# Patient Record
Sex: FEMALE | Race: OTHER | HISPANIC OR LATINO | URBAN - METROPOLITAN AREA
[De-identification: names, ages, dates, MRNs, and addresses within clinical notes are randomized per-mention and may not be internally consistent; named-entity substitution may affect disease eponyms.]

---

## 2023-09-15 ENCOUNTER — EMERGENCY (EMERGENCY)
Facility: HOSPITAL | Age: 33
LOS: 1 days | Discharge: HOME | End: 2023-09-15
Attending: EMERGENCY MEDICINE
Payer: MEDICAID

## 2023-09-15 VITALS
TEMPERATURE: 99 F | HEIGHT: 61.02 IN | RESPIRATION RATE: 20 BRPM | HEART RATE: 82 BPM | SYSTOLIC BLOOD PRESSURE: 122 MMHG | DIASTOLIC BLOOD PRESSURE: 88 MMHG | WEIGHT: 134.92 LBS | OXYGEN SATURATION: 98 %

## 2023-09-15 PROCEDURE — 99285 EMERGENCY DEPT VISIT HI MDM: CPT

## 2023-09-16 VITALS
HEART RATE: 77 BPM | RESPIRATION RATE: 16 BRPM | DIASTOLIC BLOOD PRESSURE: 72 MMHG | TEMPERATURE: 98 F | SYSTOLIC BLOOD PRESSURE: 108 MMHG | OXYGEN SATURATION: 98 %

## 2023-09-16 LAB
ALBUMIN SERPL ELPH-MCNC: 4.4 G/DL — SIGNIFICANT CHANGE UP (ref 3.3–5)
ALP SERPL-CCNC: 64 U/L — SIGNIFICANT CHANGE UP (ref 40–120)
ALT FLD-CCNC: 17 U/L — SIGNIFICANT CHANGE UP (ref 10–45)
ANION GAP SERPL CALC-SCNC: 16 MMOL/L — SIGNIFICANT CHANGE UP (ref 5–17)
AST SERPL-CCNC: 18 U/L — SIGNIFICANT CHANGE UP (ref 10–40)
BILIRUB SERPL-MCNC: 0.4 MG/DL — SIGNIFICANT CHANGE UP (ref 0.2–1.2)
BUN SERPL-MCNC: 15 MG/DL — SIGNIFICANT CHANGE UP (ref 7–23)
CALCIUM SERPL-MCNC: 9.2 MG/DL — SIGNIFICANT CHANGE UP (ref 8.4–10.5)
CHLORIDE SERPL-SCNC: 106 MMOL/L — SIGNIFICANT CHANGE UP (ref 96–108)
CO2 SERPL-SCNC: 17 MMOL/L — LOW (ref 22–31)
CREAT SERPL-MCNC: 0.66 MG/DL — SIGNIFICANT CHANGE UP (ref 0.5–1.3)
EGFR: 119 ML/MIN/1.73M2 — SIGNIFICANT CHANGE UP
GLUCOSE SERPL-MCNC: 89 MG/DL — SIGNIFICANT CHANGE UP (ref 70–99)
HCG SERPL-ACNC: <2 MIU/ML — SIGNIFICANT CHANGE UP
HCT VFR BLD CALC: 41.4 % — SIGNIFICANT CHANGE UP (ref 34.5–45)
HGB BLD-MCNC: 13.8 G/DL — SIGNIFICANT CHANGE UP (ref 11.5–15.5)
LIDOCAIN IGE QN: 26 U/L — SIGNIFICANT CHANGE UP (ref 7–60)
MCHC RBC-ENTMCNC: 29.9 PG — SIGNIFICANT CHANGE UP (ref 27–34)
MCHC RBC-ENTMCNC: 33.3 GM/DL — SIGNIFICANT CHANGE UP (ref 32–36)
MCV RBC AUTO: 89.6 FL — SIGNIFICANT CHANGE UP (ref 80–100)
NRBC # BLD: 0 /100 WBCS — SIGNIFICANT CHANGE UP (ref 0–0)
PLATELET # BLD AUTO: 180 K/UL — SIGNIFICANT CHANGE UP (ref 150–400)
POTASSIUM SERPL-MCNC: 4 MMOL/L — SIGNIFICANT CHANGE UP (ref 3.5–5.3)
POTASSIUM SERPL-SCNC: 4 MMOL/L — SIGNIFICANT CHANGE UP (ref 3.5–5.3)
PROT SERPL-MCNC: 7.4 G/DL — SIGNIFICANT CHANGE UP (ref 6–8.3)
RBC # BLD: 4.62 M/UL — SIGNIFICANT CHANGE UP (ref 3.8–5.2)
RBC # FLD: 12.1 % — SIGNIFICANT CHANGE UP (ref 10.3–14.5)
SODIUM SERPL-SCNC: 139 MMOL/L — SIGNIFICANT CHANGE UP (ref 135–145)
WBC # BLD: 9.29 K/UL — SIGNIFICANT CHANGE UP (ref 3.8–10.5)
WBC # FLD AUTO: 9.29 K/UL — SIGNIFICANT CHANGE UP (ref 3.8–10.5)

## 2023-09-16 PROCEDURE — 74177 CT ABD & PELVIS W/CONTRAST: CPT | Mod: 26,MA

## 2023-09-16 RX ORDER — ACETAMINOPHEN 500 MG
650 TABLET ORAL ONCE
Refills: 0 | Status: COMPLETED | OUTPATIENT
Start: 2023-09-16 | End: 2023-09-16

## 2023-09-16 RX ORDER — FAMOTIDINE 10 MG/ML
20 INJECTION INTRAVENOUS ONCE
Refills: 0 | Status: COMPLETED | OUTPATIENT
Start: 2023-09-16 | End: 2023-09-16

## 2023-09-16 RX ORDER — FAMOTIDINE 10 MG/ML
20 INJECTION INTRAVENOUS ONCE
Refills: 0 | Status: DISCONTINUED | OUTPATIENT
Start: 2023-09-16 | End: 2023-09-16

## 2023-09-16 RX ADMIN — Medication 650 MILLIGRAM(S): at 05:01

## 2023-09-16 RX ADMIN — Medication 30 MILLILITER(S): at 03:00

## 2023-09-16 RX ADMIN — FAMOTIDINE 20 MILLIGRAM(S): 10 INJECTION INTRAVENOUS at 03:00

## 2023-09-16 NOTE — ED PROVIDER NOTE - NSFOLLOWUPINSTRUCTIONS_ED_ALL_ED_FT
You were found to have a right ruptured ovarian cyst on your CT scan of the abdomen. This was likely causing your abdominal pain. There is no immediate treatment you need for this. You can use heat pack and Tylenol 650mg every 6 hours for treatment of the pain.     If you experience any worsening abdominal pain, fever, chills, nausea or vomiting, please return to the emergency room for further management.     Please follow up with your primary care provider or the general medicine clinic in 1-2 weeks for further monitoring of your symptoms.     Andorran:  Se descubrió que tenía un quiste ovárico derecho roto en logan tomografía computarizada del abdomen. Probablemente esto estaba causando logan dolor abdominal. No existe ningún tratamiento inmediato que necesite para esto. Puede utilizar andrea compresa térmica y Tylenol 650 mg cada 6 horas para el tratamiento del dolor.    Si experimenta algún empeoramiento del dolor abdominal, fiebre, escalofríos, náuseas o vómitos, regrese a la dillan de emergencias para recibir tratamiento adicional.    La Nena un seguimiento con logan proveedor de atención primaria o con la clínica de medicina general en 1 a 2 semanas para un mayor seguimiento de kristin síntomas.

## 2023-09-16 NOTE — ED PROVIDER NOTE - CLINICAL SUMMARY MEDICAL DECISION MAKING FREE TEXT BOX
34 yo F p/w chest and abdominal pain, EKG normal, symptoms most likely 2/2 GERD, possible H. pylori recurrence. Pt does not follow with a GI doctor. Lower concern for acute MI / NSTEMI given age and lack of EKG changes. 32 yo F p/w chest and abdominal pain, EKG normal, symptoms most likely 2/2 GERD, possible H. pylori recurrence. Pt does not follow with a GI doctor. Heart score 0, EKG non-ischemic, non-exertional Sx. 32 yo F p/w chest and abdominal pain, EKG normal, symptoms most likely 2/2 GERD, possible H. pylori recurrence. Pt does not follow with a GI doctor. Heart score 0, EKG non-ischemic, non-exertional Sx.     CT A/P ordered - found to have a Right corpus luteal cyst measuring 2 cm with surrounding free fluid, possibly ruptured, likely causing source of pain.    Abdominal pain improved at time of d/c, no acute abdominal pathology seen on CT.

## 2023-09-16 NOTE — ED PROVIDER NOTE - NSFOLLOWUPCLINICS_GEN_ALL_ED_FT
[facility] General Internal Medicine  General Internal Medicine  [address] [id number]  Phone: [phone]  Fax:   Follow Up Time: 7-10 Days

## 2023-09-16 NOTE — ED PROVIDER NOTE - PATIENT PORTAL LINK FT
You can access the FollowMyHealth Patient Portal offered by [facility] by registering at the following website: [email or web address]. By joining [employer]’s FollowMyHealth portal, you will also be able to view your health information using other applications (apps) compatible with our system.

## 2023-09-16 NOTE — ED ADULT NURSE NOTE - OBJECTIVE STATEMENT
32 y/o F with PMH of PCOS presents to ED complaining of chest pain. Pt reports epigastric and chest pain associated with shortness of breath and a HA since last night at 2000. Pt repots chest pain is worse with inspiration. Pt reports symptoms are similar feeling to a previous diagnosis of H pylori.  Denies any leg pain or swelling at this time. LMP was 8/24. Pt has not taken any medication for her pain. Upon arrival, pt is A&Ox4, satting well on RA. NSR on cardiac monitor. Afebrile orally. Denies dizziness, vision changes,, vomiting, diarrhea, fevers, chills, dysuria, hematuria, recent illness travel or fall.

## 2023-09-16 NOTE — ED PROVIDER NOTE - ATTENDING CONTRIBUTION TO CARE
Afebrile. Awake and Alert. Lungs CTA. Heart RRR. Abdomen soft, epigastric TTP mild without guarding, ND. CN II-XII grossly intact. Moves all extremities without lateralization. Afebrile. Awake and Alert. Lungs CTA. Heart RRR. Abdomen soft, RLQ TTP without guarding, ND. CN II-XII grossly intact. Moves all extremities without lateralization.

## 2023-09-16 NOTE — ED PROVIDER NOTE - PHYSICAL EXAMINATION
PHYSICAL EXAM:  GENERAL: NAD, well-groomed, well-developed  HEAD:  Atraumatic, Normocephalic  EYES: EOMI, PERRLA, conjunctiva and sclera clear  ENMT: No tonsillar erythema, exudates, or enlargement; Moist mucous membranes, Good dentition, No lesions  NECK: Supple, No JVD, Normal thyroid  CHEST/LUNG: Clear to percussion bilaterally; No rales, rhonchi, wheezing, or rubs  HEART: Regular rate and rhythm; No murmurs, rubs, or gallops  ABDOMEN: Soft, Nontender, Nondistended; Bowel sounds present  VASCULAR:  2+ Peripheral Pulses, No clubbing, cyanosis, or edema  LYMPH: No lymphadenopathy noted  SKIN: No rashes or lesions  NERVOUS SYSTEM:  Alert & Oriented X3, Good concentration;

## 2023-09-16 NOTE — ED PROVIDER NOTE - CHILD ABUSE FACILITY
Patient Education        Blood in the Urine: Care Instructions  Your Care Instructions    Blood in the urine, or hematuria, may make the urine look red, brown, or pink. There may be blood every time you urinate or just from time to time. You cannot always see blood in the urine, but it will show up in a urine test.  Blood in the urine may be serious. It should always be checked by a doctor. Your doctor may recommend more tests, including an X-ray, a CT scan, or a cystoscopy (which lets a doctor look inside the urethra and bladder). Blood in the urine can be a sign of another problem. Common causes are bladder infections and kidney stones. An injury to your groin or your genital area can also cause bleeding in the urinary tract. Very hard exercise--such as running a marathon--can cause blood in the urine. Blood in the urine can also be a sign of kidney disease or cancer in the bladder or kidney. Many cases of blood in the urine are caused by a harmless condition that runs in families. This is called benign familial hematuria. It does not need any treatment. Sometimes your urine may look red or brown even though it does not contain blood. For example, not getting enough fluids (dehydration), taking certain medicines, or having a liver problem can change the color of your urine. Eating foods such as beets, rhubarb, or blackberries or foods with red food coloring can make your urine look red or pink. Follow-up care is a key part of your treatment and safety. Be sure to make and go to all appointments, and call your doctor if you are having problems. It's also a good idea to know your test results and keep a list of the medicines you take. When should you call for help? Call your doctor now or seek immediate medical care if:    · You have symptoms of a urinary infection. For example:  ? You have pus in your urine. ? You have pain in your back just below your rib cage. This is called flank pain. ?  You have a fever, chills, or body aches. ? It hurts to urinate. ? You have groin or belly pain.     · You have more blood in your urine.    Watch closely for changes in your health, and be sure to contact your doctor if:    · You have new urination problems.     · You do not get better as expected. Where can you learn more? Go to http://jaquan-treva.info/  Enter K900 in the search box to learn more about \"Blood in the Urine: Care Instructions. \"  Current as of: 2019Content Version: 12.4  © 6243-7842 [employer]. Care instructions adapted under license by [employer] (which disclaims liability or warranty for this information). If you have questions about a medical condition or this instruction, always ask your healthcare professional. [address] any warranty or liability for your use of this information. Patient Education        Kidney Transplant: Care Instructions  Your Care Instructions    A kidney transplant gives you a healthy kidney from another person. You may need a transplant if your kidneys work poorly because of diabetes, high blood pressure, or another illness. You need only one kidney to live. The new kidney can do the work that your own kidneys cannot. It will remove waste from your blood. It will keep your body's fluids and chemicals in balance. A new kidney can improve the quality of your life. You are likely to feel better and have more energy. The new kidney may come from someone you know, a stranger, or a person who has . Getting a new kidney can sometimes take a long time. You have to meet certain rules to be able to get a kidney. For example, your overall health (other than kidney problems) has to be good. If a relative or another living person has a kidney that is a good match, you may not have to wait long.  If you need a kidney from a person who has , your name will be put on a waiting list.  Follow-up care is a key part of your treatment and safety. Be sure to make and go to all appointments, and call your doctor if you are having problems. It's also a good idea to know your test results and keep a list of the medicines you take. How is a kidney transplant done? Your doctor will put the new kidney in your body and leave your own kidneys where they are, unless there is a reason to take them out. Your doctor will connect the blood vessels of the new kidney to your blood vessels, and the ureter of the new kidney to your bladder. A ureter is the tube that carries urine from the kidney to the bladder. The new kidney may make urine right away. But in some cases it may take a while to work. If the new kidney does not work right away, you will have dialysis until it starts to work. The transplant usually takes from 3 to 6 hours. You may stay in the hospital 5 to 10 days. After you leave the hospital, you will have follow-up visits to make sure the kidney is working well. The living person who gives a kidney to you will likely stay in the hospital for about a week. What should you think about before getting a kidney transplant? · Learn as much as you can about kidney transplant centers. Find out if the center will accept your insurance. · You will need blood tests and tissue tests, and the results will be used to match you with a donor. · Your name will be put on a waiting list. It can take months or sometimes years before you receive the new organ. When a kidney becomes available, your transplant doctor will consider whether the donor is a good match for you. The team also will look at your health and how long you have been waiting. · After the transplant, you have to take medicine every day. The medicine will help keep your body from rejecting the new organ. Sometimes these medicines don't work. If this happens, you will have dialysis again and may wait for another transplant.   · Medicines to keep your body from rejecting the new kidney can cause side effects. They can make your body less able to fight infections. They may increase your risk of heart problems, diabetes, cancer, and thin bones (osteoporosis). · Having an organ transplant can bring up many emotions. You may feel grateful and happy. But you also may feel guilty or depressed. Share your feelings with your doctor and family. If you are depressed, you can get treatment. · You have to take care of yourself after the transplant. You need to go to follow-up visits with your doctor, take your medicines as directed, and eat well and get regular exercise. When should you call for help? Watch closely for changes in your health, and be sure to contact your doctor if:    · You want more information about having a kidney transplant. Where can you learn more? Go to http://jaquan-treva.info/  Enter F250 in the search box to learn more about \"Kidney Transplant: Care Instructions. \"  Current as of: September 8, 2019Content Version: 12.4  © 9378-7595 [employer]. Care instructions adapted under license by [employer] (which disclaims liability or warranty for this information). If you have questions about a medical condition or this instruction, always ask your healthcare professional. [address] any warranty or liability for your use of this information. [facility]

## 2023-09-16 NOTE — ED PROVIDER NOTE - NS ED ROS FT
General: no weakness, no fatigue, no change in weight  HEENT: No blurry vision, no congestion, no rhinorrhea, no ear pain, no throat pain, no odynophagia,   Respiratory: No cough, no shortness of breath  Cardiac: + chest pain, no palpitations  GI: + abdominal pain, +nausea, no vomiting, no constipation, no diarrhea  : No dysuria, no hematuria  MSK: No swelling in extremities, no arthralgias, no back pain  Neuro: +headache, no dizziness  Skin: No rashes

## 2023-09-16 NOTE — ED ADULT NURSE NOTE - NSFALLUNIVINTERV_ED_ALL_ED
normal (ped)...
Bed/Stretcher in lowest position, wheels locked, appropriate side rails in place/Call bell, personal items and telephone in reach/Instruct patient to call for assistance before getting out of bed/chair/stretcher/Non-slip footwear applied when patient is off stretcher/[place] to call system/Physically safe environment - no spills, clutter or unnecessary equipment/Purposeful proactive rounding/Room/bathroom lighting operational, light cord in reach

## 2023-09-16 NOTE — ED PROVIDER NOTE - OBJECTIVE STATEMENT
34 yo F w/ pmhx of PCOS and H. pylori p/w SOB, chest and epigastric pain, and headache. Pt reports symptoms began at 8PM last night with epigastric and sharp chest pain without radiation. Endorses SOB as breathing deeply aggrevated the pain. Also endorses that palpations slightly worsens chest painReports a HA and Nausea at this time. She reports she had been diagnosed with H. Pylori in the past and complete treatement but symptoms currently feel similar to H. pylori GERD. She reports a family history of CAD and MI in father (in his 70s) and Grandmother. Denies any leg pain or swelling at this time. Has not taken anything for the chest pain / abdominal pain at this time. LMP was 8/24

## 2023-09-16 NOTE — ED PROVIDER NOTE - PROGRESS NOTE DETAILS
CT abdomen pelvis reveals possible ruptured right cyst.  Patient feels symptoms are improving.  Labs are nonactionable. NICOLA

## 2023-10-25 ENCOUNTER — APPOINTMENT (OUTPATIENT)
Dept: OBGYN | Facility: CLINIC | Age: 33
End: 2023-10-25
Payer: MEDICAID

## 2023-10-25 VITALS
WEIGHT: 132 LBS | OXYGEN SATURATION: 98 % | HEIGHT: 60 IN | DIASTOLIC BLOOD PRESSURE: 78 MMHG | SYSTOLIC BLOOD PRESSURE: 118 MMHG | TEMPERATURE: 98.41 F | BODY MASS INDEX: 25.91 KG/M2 | HEART RATE: 82 BPM

## 2023-10-25 DIAGNOSIS — N64.3 GALACTORRHEA NOT ASSOCIATED WITH CHILDBIRTH: ICD-10-CM

## 2023-10-25 DIAGNOSIS — Z83.3 FAMILY HISTORY OF DIABETES MELLITUS: ICD-10-CM

## 2023-10-25 DIAGNOSIS — N89.8 OTHER SPECIFIED NONINFLAMMATORY DISORDERS OF VAGINA: ICD-10-CM

## 2023-10-25 DIAGNOSIS — Z78.9 OTHER SPECIFIED HEALTH STATUS: ICD-10-CM

## 2023-10-25 DIAGNOSIS — R10.2 PELVIC AND PERINEAL PAIN: ICD-10-CM

## 2023-10-25 DIAGNOSIS — Z12.4 ENCOUNTER FOR SCREENING FOR MALIGNANT NEOPLASM OF CERVIX: ICD-10-CM

## 2023-10-25 PROBLEM — Z00.00 ENCOUNTER FOR PREVENTIVE HEALTH EXAMINATION: Status: ACTIVE | Noted: 2023-10-25

## 2023-10-25 LAB
HCG UR QL: NEGATIVE
QUALITY CONTROL: YES

## 2023-10-25 PROCEDURE — 99214 OFFICE O/P EST MOD 30 MIN: CPT | Mod: TH,25

## 2023-10-25 PROCEDURE — 81025 URINE PREGNANCY TEST: CPT

## 2023-10-25 PROCEDURE — 99395 PREV VISIT EST AGE 18-39: CPT | Mod: 25

## 2023-10-26 PROBLEM — R10.2 PELVIC PAIN: Status: ACTIVE | Noted: 2023-10-25

## 2023-10-26 PROBLEM — N64.3 GALACTORRHEA, BILATERAL: Status: ACTIVE | Noted: 2023-10-26

## 2023-10-28 LAB
C TRACH RRNA SPEC QL NAA+PROBE: NOT DETECTED
CANDIDA VAG CYTO: NOT DETECTED
G VAGINALIS+PREV SP MTYP VAG QL MICRO: NOT DETECTED
HPV 16 E6+E7 MRNA CVX QL NAA+PROBE: NOT DETECTED
HPV HIGH+LOW RISK DNA PNL CVX: NOT DETECTED
HPV18+45 E6+E7 MRNA CVX QL NAA+PROBE: NOT DETECTED
N GONORRHOEA RRNA SPEC QL NAA+PROBE: NOT DETECTED
SOURCE AMPLIFICATION: NORMAL
T VAGINALIS VAG QL WET PREP: NOT DETECTED

## 2023-10-31 LAB — CYTOLOGY CVX/VAG DOC THIN PREP: NORMAL

## 2023-11-07 ENCOUNTER — OUTPATIENT (OUTPATIENT)
Dept: OUTPATIENT SERVICES | Facility: HOSPITAL | Age: 33
LOS: 1 days | End: 2023-11-07
Payer: MEDICAID

## 2023-11-07 ENCOUNTER — APPOINTMENT (OUTPATIENT)
Dept: ULTRASOUND IMAGING | Facility: HOSPITAL | Age: 33
End: 2023-11-07
Payer: MEDICAID

## 2023-11-07 DIAGNOSIS — R10.2 PELVIC AND PERINEAL PAIN: ICD-10-CM

## 2023-11-07 PROCEDURE — 76856 US EXAM PELVIC COMPLETE: CPT | Mod: 26

## 2023-11-07 PROCEDURE — 76856 US EXAM PELVIC COMPLETE: CPT

## 2023-11-07 PROCEDURE — 76830 TRANSVAGINAL US NON-OB: CPT

## 2023-11-07 PROCEDURE — 76830 TRANSVAGINAL US NON-OB: CPT | Mod: 26

## 2024-01-17 ENCOUNTER — TRANSCRIPTION ENCOUNTER (OUTPATIENT)
Age: 34
End: 2024-01-17

## 2024-01-17 ENCOUNTER — APPOINTMENT (OUTPATIENT)
Dept: OBGYN | Facility: CLINIC | Age: 34
End: 2024-01-17
Payer: MEDICAID

## 2024-01-17 VITALS
TEMPERATURE: 98 F | OXYGEN SATURATION: 98 % | HEIGHT: 60 IN | SYSTOLIC BLOOD PRESSURE: 116 MMHG | BODY MASS INDEX: 25.91 KG/M2 | HEART RATE: 89 BPM | WEIGHT: 132 LBS | DIASTOLIC BLOOD PRESSURE: 68 MMHG

## 2024-01-17 DIAGNOSIS — N92.0 EXCESSIVE AND FREQUENT MENSTRUATION WITH REGULAR CYCLE: ICD-10-CM

## 2024-01-17 DIAGNOSIS — Z11.3 ENCOUNTER FOR SCREENING FOR INFECTIONS WITH A PREDOMINANTLY SEXUAL MODE OF TRANSMISSION: ICD-10-CM

## 2024-01-17 DIAGNOSIS — N83.209 UNSPECIFIED OVARIAN CYST, UNSPECIFIED SIDE: ICD-10-CM

## 2024-01-17 DIAGNOSIS — N94.6 DYSMENORRHEA, UNSPECIFIED: ICD-10-CM

## 2024-01-17 DIAGNOSIS — Z87.42 PERSONAL HISTORY OF OTHER DISEASES OF THE FEMALE GENITAL TRACT: ICD-10-CM

## 2024-01-17 PROCEDURE — 99214 OFFICE O/P EST MOD 30 MIN: CPT

## 2024-01-19 PROBLEM — N94.6 DYSMENORRHEA: Status: ACTIVE | Noted: 2023-10-26

## 2024-01-19 PROBLEM — N83.209 OVARIAN CYST: Status: ACTIVE | Noted: 2024-01-17

## 2024-01-19 PROBLEM — N92.0 HEAVY MENSES: Status: ACTIVE | Noted: 2023-10-26

## 2024-01-19 NOTE — PHYSICAL EXAM
[Chaperone Present] : A chaperone was present in the examining room during all aspects of the physical examination [Soft] : soft [Non-tender] : non-tender [Non-distended] : non-distended [Oriented x3] : oriented x3 [Labia Majora] : normal [Labia Minora] : normal [No Bleeding] : There was no active vaginal bleeding [Normal] : normal [Uterine Adnexae] : normal

## 2024-01-19 NOTE — HISTORY OF PRESENT ILLNESS
[FreeTextEntry1] : 34 yo  c/o of dysmenorrhea LMP 23.  Patient did not schedule follow US showing a 3.5 cm simple ovarian cyst [Patient reported PAP Smear was normal] : Patient reported PAP Smear was normal [Y] : Positive pregnancy history [PapSmeardate] : 10/23 [PGxTotal] : 1 [[facility] [No] : Patient does not have concerns regarding sex [Currently Active] : currently active

## 2024-01-19 NOTE — HISTORY OF PRESENT ILLNESS
[FreeTextEntry1] : 32 yo  c/o of dysmenorrhea LMP 23.  Patient did not schedule follow US showing a 3.5 cm simple ovarian cyst [Patient reported PAP Smear was normal] : Patient reported PAP Smear was normal [Y] : Positive pregnancy history [PapSmeardate] : 10/23 [PGxTotal] : 1 [[facility] [No] : Patient does not have concerns regarding sex [Currently Active] : currently active

## 2024-01-19 NOTE — COUNSELING
[Nutrition/ Exercise/ Weight Management] : nutrition, exercise, weight management [Vitamins/Supplements] : vitamins/supplements [Bladder Hygiene] : bladder hygiene [Contraception/ Emergency Contraception/ Safe Sexual Practices] : contraception, emergency contraception, safe sexual practices [Medication Management] : medication management [FreeTextEntry2] : BCP usage, risks benefits, side effects and possible complications

## 2024-01-30 ENCOUNTER — APPOINTMENT (OUTPATIENT)
Dept: ORTHOPEDIC SURGERY | Facility: CLINIC | Age: 34
End: 2024-01-30
Payer: MEDICAID

## 2024-01-30 VITALS — HEIGHT: 60 IN | WEIGHT: 132 LBS | BODY MASS INDEX: 25.91 KG/M2

## 2024-01-30 DIAGNOSIS — M79.641 PAIN IN RIGHT HAND: ICD-10-CM

## 2024-01-30 DIAGNOSIS — M79.642 PAIN IN RIGHT HAND: ICD-10-CM

## 2024-01-30 DIAGNOSIS — M65.30 TRIGGER FINGER, UNSPECIFIED FINGER: ICD-10-CM

## 2024-01-30 PROCEDURE — 20550 NJX 1 TENDON SHEATH/LIGAMENT: CPT

## 2024-01-30 PROCEDURE — 99203 OFFICE O/P NEW LOW 30 MIN: CPT | Mod: 25

## 2024-01-30 NOTE — HISTORY OF PRESENT ILLNESS
[de-identified] : 33 year old RHD female presents today with RUDDY hand pain R>L. She complains of A1 pulley tenderness in the right ring finger and locking which has been going on for 4 months. She reports frequent locking of the finger. She has been taking Tylenol and Advil for pain relief. She also notes mild numbness of both hands which occurs at night time. She works doing food preparation in a restaurant.

## 2024-01-30 NOTE — ADDENDUM
[FreeTextEntry1] :  I, Yoel Sanchez wrote this note acting as a scribe for Dr. Alcon Marrero on Jan 30, 2024.

## 2024-01-30 NOTE — PHYSICAL EXAM
[de-identified] :  Patient is WDWN, alert, and in no acute distress. Breathing is unlabored. She is grossly oriented to person, place, and time.   She was accompanied by Her family member today.  Right Wrist:  There is A1 pulley tenderness in the right ring finger. Full arc of motion in the fingers, and all intrinsic and extrinsic hand muscles 5/5. No joint instability on provocative testing, sensation is intact to light touch, and no skin lesions or discoloration. [de-identified] :  No imaging done today 01/30/2024.

## 2024-01-30 NOTE — END OF VISIT
[FreeTextEntry3] :  All medical record entries made by the Scribe were at my,  Dr. Alcon Marrero MD., direction and personally dictated by me on 01/30/2024. I have personally reviewed the chart and agree that the record accurately reflects my personal performance of the history, physical exam, assessment and plan.

## 2024-01-30 NOTE — DISCUSSION/SUMMARY
[de-identified] :  The underlying pathophysiology was reviewed with the patient. XR films were reviewed with the patient. Discussed at length the nature of the patient's condition.   Patient was advised to take OTC medications and topical analgesic for pain management. She was informed the option of cortisone injection if the symptom is worse.   The patient wishes to proceed with a cortisone injection at this time. The skin was prepped with alcohol and sprayed with Ethyl Chloride. An injection of 0.5 cc 1% Lidocaine without epinephrine, 0.25 cc Kenalog 40mg, and 0.25 cc Dexamethasone was administered into the flexor tendon sheath of the right ring finger. The patient tolerated the procedure well. Apply ice.   All questions answered, understanding verbalized. Patient in agreement with plan of care.   Patient was advised to follow up as needed.

## 2024-01-31 ENCOUNTER — OUTPATIENT (OUTPATIENT)
Dept: OUTPATIENT SERVICES | Facility: HOSPITAL | Age: 34
LOS: 1 days | End: 2024-01-31
Payer: MEDICAID

## 2024-01-31 ENCOUNTER — APPOINTMENT (OUTPATIENT)
Dept: ULTRASOUND IMAGING | Facility: HOSPITAL | Age: 34
End: 2024-01-31
Payer: MEDICAID

## 2024-01-31 DIAGNOSIS — N83.209 UNSPECIFIED OVARIAN CYST, UNSPECIFIED SIDE: ICD-10-CM

## 2024-01-31 DIAGNOSIS — N94.6 DYSMENORRHEA, UNSPECIFIED: ICD-10-CM

## 2024-01-31 PROCEDURE — 76830 TRANSVAGINAL US NON-OB: CPT | Mod: 26

## 2024-01-31 PROCEDURE — 76830 TRANSVAGINAL US NON-OB: CPT

## 2024-01-31 PROCEDURE — 76856 US EXAM PELVIC COMPLETE: CPT

## 2024-01-31 PROCEDURE — 76856 US EXAM PELVIC COMPLETE: CPT | Mod: 26

## 2024-02-07 ENCOUNTER — EMERGENCY (EMERGENCY)
Facility: HOSPITAL | Age: 34
LOS: 1 days | Discharge: HOME | End: 2024-02-07
Attending: EMERGENCY MEDICINE
Payer: MEDICAID

## 2024-02-07 VITALS
RESPIRATION RATE: 18 BRPM | HEART RATE: 80 BPM | WEIGHT: 138.01 LBS | TEMPERATURE: 98 F | SYSTOLIC BLOOD PRESSURE: 124 MMHG | OXYGEN SATURATION: 100 % | DIASTOLIC BLOOD PRESSURE: 78 MMHG

## 2024-02-07 LAB
ALBUMIN SERPL ELPH-MCNC: 4.5 G/DL — SIGNIFICANT CHANGE UP (ref 3.3–5)
ALP SERPL-CCNC: 57 U/L — SIGNIFICANT CHANGE UP (ref 40–120)
ALT FLD-CCNC: 17 U/L — SIGNIFICANT CHANGE UP (ref 10–45)
ANION GAP SERPL CALC-SCNC: 11 MMOL/L — SIGNIFICANT CHANGE UP (ref 5–17)
AST SERPL-CCNC: 14 U/L — SIGNIFICANT CHANGE UP (ref 10–40)
BASOPHILS # BLD AUTO: 0.03 K/UL — SIGNIFICANT CHANGE UP (ref 0–0.2)
BASOPHILS NFR BLD AUTO: 0.3 % — SIGNIFICANT CHANGE UP (ref 0–2)
BILIRUB SERPL-MCNC: 0.2 MG/DL — SIGNIFICANT CHANGE UP (ref 0.2–1.2)
BLD GP AB SCN SERPL QL: NEGATIVE — SIGNIFICANT CHANGE UP
BUN SERPL-MCNC: 12 MG/DL — SIGNIFICANT CHANGE UP (ref 7–23)
CALCIUM SERPL-MCNC: 9.5 MG/DL — SIGNIFICANT CHANGE UP (ref 8.4–10.5)
CHLORIDE SERPL-SCNC: 103 MMOL/L — SIGNIFICANT CHANGE UP (ref 96–108)
CO2 SERPL-SCNC: 23 MMOL/L — SIGNIFICANT CHANGE UP (ref 22–31)
CREAT SERPL-MCNC: 0.67 MG/DL — SIGNIFICANT CHANGE UP (ref 0.5–1.3)
EGFR: 119 ML/MIN/1.73M2 — SIGNIFICANT CHANGE UP
EOSINOPHIL # BLD AUTO: 0.3 K/UL — SIGNIFICANT CHANGE UP (ref 0–0.5)
EOSINOPHIL NFR BLD AUTO: 2.6 % — SIGNIFICANT CHANGE UP (ref 0–6)
GLUCOSE SERPL-MCNC: 95 MG/DL — SIGNIFICANT CHANGE UP (ref 70–99)
HCG SERPL-ACNC: HIGH MIU/ML
HCT VFR BLD CALC: 40.9 % — SIGNIFICANT CHANGE UP (ref 34.5–45)
HGB BLD-MCNC: 13.7 G/DL — SIGNIFICANT CHANGE UP (ref 11.5–15.5)
IMM GRANULOCYTES NFR BLD AUTO: 0.4 % — SIGNIFICANT CHANGE UP (ref 0–0.9)
LYMPHOCYTES # BLD AUTO: 26.8 % — SIGNIFICANT CHANGE UP (ref 13–44)
LYMPHOCYTES # BLD AUTO: 3.06 K/UL — SIGNIFICANT CHANGE UP (ref 1–3.3)
MCHC RBC-ENTMCNC: 29.7 PG — SIGNIFICANT CHANGE UP (ref 27–34)
MCHC RBC-ENTMCNC: 33.5 GM/DL — SIGNIFICANT CHANGE UP (ref 32–36)
MCV RBC AUTO: 88.5 FL — SIGNIFICANT CHANGE UP (ref 80–100)
MONOCYTES # BLD AUTO: 0.94 K/UL — HIGH (ref 0–0.9)
MONOCYTES NFR BLD AUTO: 8.2 % — SIGNIFICANT CHANGE UP (ref 2–14)
NEUTROPHILS # BLD AUTO: 7.02 K/UL — SIGNIFICANT CHANGE UP (ref 1.8–7.4)
NEUTROPHILS NFR BLD AUTO: 61.7 % — SIGNIFICANT CHANGE UP (ref 43–77)
NRBC # BLD: 0 /100 WBCS — SIGNIFICANT CHANGE UP (ref 0–0)
PLATELET # BLD AUTO: 244 K/UL — SIGNIFICANT CHANGE UP (ref 150–400)
POTASSIUM SERPL-MCNC: 4.2 MMOL/L — SIGNIFICANT CHANGE UP (ref 3.5–5.3)
POTASSIUM SERPL-SCNC: 4.2 MMOL/L — SIGNIFICANT CHANGE UP (ref 3.5–5.3)
PROT SERPL-MCNC: 7.1 G/DL — SIGNIFICANT CHANGE UP (ref 6–8.3)
RBC # BLD: 4.62 M/UL — SIGNIFICANT CHANGE UP (ref 3.8–5.2)
RBC # FLD: 12.8 % — SIGNIFICANT CHANGE UP (ref 10.3–14.5)
RH IG SCN BLD-IMP: POSITIVE — SIGNIFICANT CHANGE UP
SODIUM SERPL-SCNC: 137 MMOL/L — SIGNIFICANT CHANGE UP (ref 135–145)
WBC # BLD: 11.4 K/UL — HIGH (ref 3.8–10.5)
WBC # FLD AUTO: 11.4 K/UL — HIGH (ref 3.8–10.5)

## 2024-02-07 PROCEDURE — 93975 VASCULAR STUDY: CPT | Mod: 26

## 2024-02-07 PROCEDURE — 76817 TRANSVAGINAL US OBSTETRIC: CPT | Mod: 26

## 2024-02-07 PROCEDURE — 99284 EMERGENCY DEPT VISIT MOD MDM: CPT

## 2024-02-07 NOTE — ED PROVIDER NOTE - ATTENDING CONTRIBUTION TO CARE
MD Sandoval:  patient seen and evaluated personally.   I agree with the History & Physical,  Impression & Plan other than what was detailed in my note.  MD Sandoval  33-year-old female  no pertinent past medical history presents to ed w/ cc of two days of abd pain, cramping sensation, some vaginal bleeding, no f/c n/v, pt had pos urine test for pregnancy at home.   GENERAL: Vital signs are within normal limits  HEENT: NC/AT, conjunctiva noninjected, sclera anicteric, moist mucous membranes,  NECK: Supple, trachea midline  LUNG: Nonlabored respirations  CV: RRR, Pulses- Radial: 2+ b/l  ABDOMEN: Nondistended, no sig lower abd ttp   MSK: No visible deformities  NEURO: AAOx4 (to person, place, time, event), no tremor, steady gait  PSYCH: Normal mood and affect  Plan for cbc, cmp, hcg, transvaginal r/o ectopic pregnancy, type screen, re valute.

## 2024-02-07 NOTE — ED ADULT TRIAGE NOTE - CHIEF COMPLAINT QUOTE
+pregnancy test yesterday (urine and blood test), sudden onset vaginal bleeding and abdominal pain today. LMP 12/15/23, second pregnancy.

## 2024-02-07 NOTE — ED PROVIDER NOTE - PHYSICAL EXAMINATION
Gen: WDWN, NAD  HEENT: EOMI, no nasal discharge, mucous membranes moist  CV: RRR, +S1/S2, no M/R/G  Resp: CTAB, no W/R/R  GI: Abdomen soft non-distended, NTTP, no masses  MSK: No open wounds, no bruising, no LE edema  Neuro: A&Ox4, following commands, moving all four extremities spontaneously

## 2024-02-07 NOTE — ED PROVIDER NOTE - RAPID ASSESSMENT
34 yo female pmhx migraines presents to ED c/o lower abdominal cramping and vaginal bleeding starting today in setting of positive pregnancy test yesterday.  Prior to testing yesterday patient did not know she was pregnant.  Today started to develop vague lower abdominal cramping which progressed to light vaginal bleeding and spotting.  Came to ED for evaluation.  First pregnancy was high risk though patient delivered.  Denies fever/chills, urinary symptoms.  LMP 12/15/23     [place] ID [id number]    **Patient was rapidly assessed by Paco mccauley Kearney, PA-C. A limited history was obtained. The patient will be seen and further examined and worked up in the main ED and their care will be completed by the main ED team. Receiving team will follow up on labs, analgesia, any clinical imaging, and perform reassessment and disposition of the patient as clinically indicated. All decisions regarding the progression of care will be made at their discretion.

## 2024-02-07 NOTE — ED PROVIDER NOTE - CLINICAL SUMMARY MEDICAL DECISION MAKING FREE TEXT BOX
33-year-old female past medical history as above here for vaginal bleeding in the setting of positive pregnancy test.  Vital signs stable, physical exam as above.  Differential includes but is not limited to   (unclear if threatened or inevitable).  Will do CBC, CMP, type and screen, hCG, TVUS and reassess. Dispo pending clinical course.

## 2024-02-07 NOTE — ED PROVIDER NOTE - PROGRESS NOTE DETAILS
Radha Moody MD; [facility] PGY4: Results of ultrasound with patient, questions answered and concerns addressed.  Recommended expedited follow-up with OB/GYN within 48 to 72 hours for repeat labs and repeat ultrasound.  Will discharge with treatment for UTI.

## 2024-02-07 NOTE — ED PROVIDER NOTE - NSFOLLOWUPCLINICS_GEN_ALL_ED_FT
[facility]  OB/GYN & Surg  [phone] [place] [address]  Phone: [phone]  Fax:   Follow Up Time: 1-3 Days

## 2024-02-07 NOTE — ED PROVIDER NOTE - PATIENT PORTAL LINK FT
You can access the FollowMyHealth Patient Portal offered by [facility] by registering at the following website: [email or web address]. By joining [employer]’s FollowMyHealth portal, you will also be able to view your health information using other applications (apps) compatible with our system.

## 2024-02-07 NOTE — ED PROVIDER NOTE - NSFOLLOWUPINSTRUCTIONS_ED_ALL_ED_FT
Jessie el medicamento para la infección del tracto urinario cada 6 horas rigoberto 7 días.  Kavin se mencionó, debe hacer un seguimiento con logan obstetra / ginecólogo dentro de las próximas 48 a 72 horas para repetir los análisis de laboratorio y repetir el ultrasonido para asegurarse de que el bebé esté joe.  Regrese si empeora el sangrado vaginal, el dolor abdominal o los calambres. [place] el medicamento para la infección del tracto urinario cada 6 horas rigoberto 7 días.  Kavin se mencionó, debe hacer un seguimiento con logan obstetra / ginecólogo dentro de las próximas 48 a 72 horas para repetir los análisis de laboratorio y repetir el ultrasonido para asegurarse de que el bebé esté joe.  Regrese si empeora el sangrado vaginal, el dolor abdominal o los calambres.    Andrea hemorragia subcoriónica (HUSSAIN, por kristin siglas en inglés), o hematoma, es andrea acumulación de ruba entre la placenta y el útero. Por lo general, la HUSSAIN se desarrolla a finales del primer trimestre. Por lo general, el sangrado se reabsorbe en el cuerpo a las 20 semanas de embarazo. La mayoría de los embarazos progresan sin problemas. Es posible que ocasionalmente tenga manchado o sangrado leve rigoberto el embarazo.    INSTRUCCIONES DE WILBERTO:  Regrese a la dillan de emergencias si:  Tiene fiebre.  Tiene dolor abdominal.  Tiene un aumento repentino del sangrado.

## 2024-02-07 NOTE — ED PROVIDER NOTE - OBJECTIVE STATEMENT
33-year-old female no pertinent past medical history, last menstrual period December 15 here for 2 days abdominal pain and cramping which was continuous as well as light vaginal bleeding and spotting.  Denies any trauma to the abdomen, clots passed, is endorsing longstanding dysuria but denies any fevers, chills,  nausea or vomiting above usual morning sickness symptoms.  Able to tolerate p.o. 33-year-old female  no pertinent past medical history, last menstrual period December 15 here for 2 days abdominal pain and cramping which was continuous as well as light vaginal bleeding and spotting.  Denies any trauma to the abdomen, clots passed, is endorsing longstanding dysuria but denies any fevers, chills,  nausea or vomiting above usual morning sickness symptoms.  Able to tolerate p.o.

## 2024-02-08 VITALS
OXYGEN SATURATION: 100 % | TEMPERATURE: 99 F | DIASTOLIC BLOOD PRESSURE: 60 MMHG | HEART RATE: 75 BPM | SYSTOLIC BLOOD PRESSURE: 111 MMHG | RESPIRATION RATE: 16 BRPM

## 2024-02-08 LAB
APPEARANCE UR: CLEAR — SIGNIFICANT CHANGE UP
BACTERIA # UR AUTO: ABNORMAL /HPF
BILIRUB UR-MCNC: NEGATIVE — SIGNIFICANT CHANGE UP
CAST: 1 /LPF — SIGNIFICANT CHANGE UP (ref 0–4)
COLOR SPEC: YELLOW — SIGNIFICANT CHANGE UP
DIFF PNL FLD: ABNORMAL
GLUCOSE UR QL: NEGATIVE MG/DL — SIGNIFICANT CHANGE UP
KETONES UR-MCNC: NEGATIVE MG/DL — SIGNIFICANT CHANGE UP
LEUKOCYTE ESTERASE UR-ACNC: ABNORMAL
NITRITE UR-MCNC: NEGATIVE — SIGNIFICANT CHANGE UP
PH UR: 6.5 — SIGNIFICANT CHANGE UP (ref 5–8)
PROT UR-MCNC: NEGATIVE MG/DL — SIGNIFICANT CHANGE UP
RBC CASTS # UR COMP ASSIST: 7 /HPF — HIGH (ref 0–4)
SP GR SPEC: 1.01 — SIGNIFICANT CHANGE UP (ref 1–1.03)
SQUAMOUS # UR AUTO: 3 /HPF — SIGNIFICANT CHANGE UP (ref 0–5)
UROBILINOGEN FLD QL: 0.2 MG/DL — SIGNIFICANT CHANGE UP (ref 0.2–1)
WBC UR QL: 11 /HPF — HIGH (ref 0–5)

## 2024-02-08 PROCEDURE — 99284 EMERGENCY DEPT VISIT MOD MDM: CPT | Mod: 25

## 2024-02-08 PROCEDURE — 76817 TRANSVAGINAL US OBSTETRIC: CPT

## 2024-02-08 PROCEDURE — 81001 URINALYSIS AUTO W/SCOPE: CPT

## 2024-02-08 PROCEDURE — 86901 BLOOD TYPING SEROLOGIC RH(D): CPT

## 2024-02-08 PROCEDURE — 86850 RBC ANTIBODY SCREEN: CPT

## 2024-02-08 PROCEDURE — 87186 SC STD MICRODIL/AGAR DIL: CPT

## 2024-02-08 PROCEDURE — 85025 COMPLETE CBC W/AUTO DIFF WBC: CPT

## 2024-02-08 PROCEDURE — 86900 BLOOD TYPING SEROLOGIC ABO: CPT

## 2024-02-08 PROCEDURE — 96374 THER/PROPH/DIAG INJ IV PUSH: CPT

## 2024-02-08 PROCEDURE — 87086 URINE CULTURE/COLONY COUNT: CPT

## 2024-02-08 PROCEDURE — 93975 VASCULAR STUDY: CPT

## 2024-02-08 PROCEDURE — 80053 COMPREHEN METABOLIC PANEL: CPT

## 2024-02-08 PROCEDURE — 84702 CHORIONIC GONADOTROPIN TEST: CPT

## 2024-02-08 RX ORDER — CEPHALEXIN 500 MG
1 CAPSULE ORAL
Qty: 28 | Refills: 0
Start: 2024-02-08 | End: 2024-02-14

## 2024-02-08 RX ORDER — CEFTRIAXONE 500 MG/1
1000 INJECTION, POWDER, FOR SOLUTION INTRAMUSCULAR; INTRAVENOUS ONCE
Refills: 0 | Status: COMPLETED | OUTPATIENT
Start: 2024-02-08 | End: 2024-02-08

## 2024-02-08 RX ADMIN — CEFTRIAXONE 100 MILLIGRAM(S): 500 INJECTION, POWDER, FOR SOLUTION INTRAMUSCULAR; INTRAVENOUS at 04:19

## 2024-02-10 LAB
-  AMOXICILLIN/CLAVULANIC ACID: SIGNIFICANT CHANGE UP
-  AMPICILLIN/SULBACTAM: SIGNIFICANT CHANGE UP
-  AMPICILLIN: SIGNIFICANT CHANGE UP
-  AZTREONAM: SIGNIFICANT CHANGE UP
-  CEFAZOLIN: SIGNIFICANT CHANGE UP
-  CEFEPIME: SIGNIFICANT CHANGE UP
-  CEFTRIAXONE: SIGNIFICANT CHANGE UP
-  CEFUROXIME: SIGNIFICANT CHANGE UP
-  CIPROFLOXACIN: SIGNIFICANT CHANGE UP
-  ERTAPENEM: SIGNIFICANT CHANGE UP
-  GENTAMICIN: SIGNIFICANT CHANGE UP
-  IMIPENEM: SIGNIFICANT CHANGE UP
-  LEVOFLOXACIN: SIGNIFICANT CHANGE UP
-  MEROPENEM: SIGNIFICANT CHANGE UP
-  NITROFURANTOIN: SIGNIFICANT CHANGE UP
-  PIPERACILLIN/TAZOBACTAM: SIGNIFICANT CHANGE UP
-  TOBRAMYCIN: SIGNIFICANT CHANGE UP
-  TRIMETHOPRIM/SULFAMETHOXAZOLE: SIGNIFICANT CHANGE UP
CULTURE RESULTS: ABNORMAL
METHOD TYPE: SIGNIFICANT CHANGE UP
ORGANISM # SPEC MICROSCOPIC CNT: ABNORMAL
ORGANISM # SPEC MICROSCOPIC CNT: ABNORMAL
SPECIMEN SOURCE: SIGNIFICANT CHANGE UP

## 2024-02-11 ENCOUNTER — INPATIENT (INPATIENT)
Facility: HOSPITAL | Age: 34
LOS: 3 days | Discharge: HOME | DRG: 832 | End: 2024-02-15
Attending: INTERNAL MEDICINE | Admitting: INTERNAL MEDICINE
Payer: MEDICAID

## 2024-02-11 VITALS
TEMPERATURE: 98 F | HEART RATE: 82 BPM | OXYGEN SATURATION: 100 % | RESPIRATION RATE: 16 BRPM | SYSTOLIC BLOOD PRESSURE: 96 MMHG | DIASTOLIC BLOOD PRESSURE: 62 MMHG

## 2024-02-11 DIAGNOSIS — N12 TUBULO-INTERSTITIAL NEPHRITIS, NOT SPECIFIED AS ACUTE OR CHRONIC: ICD-10-CM

## 2024-02-11 LAB
ALBUMIN SERPL ELPH-MCNC: 4.2 G/DL — SIGNIFICANT CHANGE UP (ref 3.3–5)
ALP SERPL-CCNC: 55 U/L — SIGNIFICANT CHANGE UP (ref 40–120)
ALT FLD-CCNC: 16 U/L — SIGNIFICANT CHANGE UP (ref 10–45)
ANION GAP SERPL CALC-SCNC: 13 MMOL/L — SIGNIFICANT CHANGE UP (ref 5–17)
APPEARANCE UR: CLEAR — SIGNIFICANT CHANGE UP
AST SERPL-CCNC: 13 U/L — SIGNIFICANT CHANGE UP (ref 10–40)
BACTERIA # UR AUTO: NEGATIVE /HPF — SIGNIFICANT CHANGE UP
BASE EXCESS BLDV CALC-SCNC: -1.4 MMOL/L — SIGNIFICANT CHANGE UP (ref -2–3)
BASOPHILS # BLD AUTO: 0.03 K/UL — SIGNIFICANT CHANGE UP (ref 0–0.2)
BASOPHILS NFR BLD AUTO: 0.3 % — SIGNIFICANT CHANGE UP (ref 0–2)
BILIRUB SERPL-MCNC: 0.2 MG/DL — SIGNIFICANT CHANGE UP (ref 0.2–1.2)
BILIRUB UR-MCNC: NEGATIVE — SIGNIFICANT CHANGE UP
BUN SERPL-MCNC: 7 MG/DL — SIGNIFICANT CHANGE UP (ref 7–23)
CA-I SERPL-SCNC: 1.22 MMOL/L — SIGNIFICANT CHANGE UP (ref 1.15–1.33)
CALCIUM SERPL-MCNC: 9 MG/DL — SIGNIFICANT CHANGE UP (ref 8.4–10.5)
CAST: 0 /LPF — SIGNIFICANT CHANGE UP (ref 0–4)
CHLORIDE BLDV-SCNC: 104 MMOL/L — SIGNIFICANT CHANGE UP (ref 96–108)
CHLORIDE SERPL-SCNC: 106 MMOL/L — SIGNIFICANT CHANGE UP (ref 96–108)
CO2 BLDV-SCNC: 27 MMOL/L — HIGH (ref 22–26)
CO2 SERPL-SCNC: 22 MMOL/L — SIGNIFICANT CHANGE UP (ref 22–31)
COLOR SPEC: YELLOW — SIGNIFICANT CHANGE UP
CREAT SERPL-MCNC: 0.61 MG/DL — SIGNIFICANT CHANGE UP (ref 0.5–1.3)
DIFF PNL FLD: NEGATIVE — SIGNIFICANT CHANGE UP
EGFR: 122 ML/MIN/1.73M2 — SIGNIFICANT CHANGE UP
EOSINOPHIL # BLD AUTO: 0.2 K/UL — SIGNIFICANT CHANGE UP (ref 0–0.5)
EOSINOPHIL NFR BLD AUTO: 2 % — SIGNIFICANT CHANGE UP (ref 0–6)
GAS PNL BLDV: 134 MMOL/L — LOW (ref 136–145)
GAS PNL BLDV: SIGNIFICANT CHANGE UP
GAS PNL BLDV: SIGNIFICANT CHANGE UP
GLUCOSE BLDV-MCNC: 90 MG/DL — SIGNIFICANT CHANGE UP (ref 70–99)
GLUCOSE SERPL-MCNC: 92 MG/DL — SIGNIFICANT CHANGE UP (ref 70–99)
GLUCOSE UR QL: NEGATIVE MG/DL — SIGNIFICANT CHANGE UP
HCG SERPL-ACNC: HIGH MIU/ML
HCO3 BLDV-SCNC: 25 MMOL/L — SIGNIFICANT CHANGE UP (ref 22–29)
HCT VFR BLD CALC: 40.3 % — SIGNIFICANT CHANGE UP (ref 34.5–45)
HCT VFR BLDA CALC: 41 % — SIGNIFICANT CHANGE UP (ref 34.5–46.5)
HGB BLD CALC-MCNC: 13.5 G/DL — SIGNIFICANT CHANGE UP (ref 11.7–16.1)
HGB BLD-MCNC: 13.1 G/DL — SIGNIFICANT CHANGE UP (ref 11.5–15.5)
IMM GRANULOCYTES NFR BLD AUTO: 0.6 % — SIGNIFICANT CHANGE UP (ref 0–0.9)
KETONES UR-MCNC: NEGATIVE MG/DL — SIGNIFICANT CHANGE UP
LACTATE BLDV-MCNC: 1.1 MMOL/L — SIGNIFICANT CHANGE UP (ref 0.5–2)
LEUKOCYTE ESTERASE UR-ACNC: NEGATIVE — SIGNIFICANT CHANGE UP
LYMPHOCYTES # BLD AUTO: 2.19 K/UL — SIGNIFICANT CHANGE UP (ref 1–3.3)
LYMPHOCYTES # BLD AUTO: 22.3 % — SIGNIFICANT CHANGE UP (ref 13–44)
MCHC RBC-ENTMCNC: 29.2 PG — SIGNIFICANT CHANGE UP (ref 27–34)
MCHC RBC-ENTMCNC: 32.5 GM/DL — SIGNIFICANT CHANGE UP (ref 32–36)
MCV RBC AUTO: 90 FL — SIGNIFICANT CHANGE UP (ref 80–100)
MONOCYTES # BLD AUTO: 0.69 K/UL — SIGNIFICANT CHANGE UP (ref 0–0.9)
MONOCYTES NFR BLD AUTO: 7 % — SIGNIFICANT CHANGE UP (ref 2–14)
NEUTROPHILS # BLD AUTO: 6.63 K/UL — SIGNIFICANT CHANGE UP (ref 1.8–7.4)
NEUTROPHILS NFR BLD AUTO: 67.8 % — SIGNIFICANT CHANGE UP (ref 43–77)
NITRITE UR-MCNC: NEGATIVE — SIGNIFICANT CHANGE UP
NRBC # BLD: 0 /100 WBCS — SIGNIFICANT CHANGE UP (ref 0–0)
PCO2 BLDV: 49 MMHG — HIGH (ref 39–42)
PH BLDV: 7.32 — SIGNIFICANT CHANGE UP (ref 7.32–7.43)
PH UR: 7 — SIGNIFICANT CHANGE UP (ref 5–8)
PLATELET # BLD AUTO: 230 K/UL — SIGNIFICANT CHANGE UP (ref 150–400)
PO2 BLDV: 24 MMHG — LOW (ref 25–45)
POTASSIUM BLDV-SCNC: 4.2 MMOL/L — SIGNIFICANT CHANGE UP (ref 3.5–5.1)
POTASSIUM SERPL-MCNC: 4.4 MMOL/L — SIGNIFICANT CHANGE UP (ref 3.5–5.3)
POTASSIUM SERPL-SCNC: 4.4 MMOL/L — SIGNIFICANT CHANGE UP (ref 3.5–5.3)
PROT SERPL-MCNC: 7.3 G/DL — SIGNIFICANT CHANGE UP (ref 6–8.3)
PROT UR-MCNC: NEGATIVE MG/DL — SIGNIFICANT CHANGE UP
RBC # BLD: 4.48 M/UL — SIGNIFICANT CHANGE UP (ref 3.8–5.2)
RBC # FLD: 12.5 % — SIGNIFICANT CHANGE UP (ref 10.3–14.5)
RBC CASTS # UR COMP ASSIST: 3 /HPF — SIGNIFICANT CHANGE UP (ref 0–4)
SAO2 % BLDV: 33.5 % — LOW (ref 67–88)
SODIUM SERPL-SCNC: 141 MMOL/L — SIGNIFICANT CHANGE UP (ref 135–145)
SP GR SPEC: 1.01 — SIGNIFICANT CHANGE UP (ref 1–1.03)
SQUAMOUS # UR AUTO: 4 /HPF — SIGNIFICANT CHANGE UP (ref 0–5)
UROBILINOGEN FLD QL: 0.2 MG/DL — SIGNIFICANT CHANGE UP (ref 0.2–1)
WBC # BLD: 9.8 K/UL — SIGNIFICANT CHANGE UP (ref 3.8–10.5)
WBC # FLD AUTO: 9.8 K/UL — SIGNIFICANT CHANGE UP (ref 3.8–10.5)
WBC UR QL: 0 /HPF — SIGNIFICANT CHANGE UP (ref 0–5)

## 2024-02-11 PROCEDURE — 93975 VASCULAR STUDY: CPT | Mod: 26

## 2024-02-11 PROCEDURE — 76801 OB US < 14 WKS SINGLE FETUS: CPT | Mod: 26

## 2024-02-11 PROCEDURE — 99285 EMERGENCY DEPT VISIT HI MDM: CPT

## 2024-02-11 PROCEDURE — 76770 US EXAM ABDO BACK WALL COMP: CPT | Mod: 26

## 2024-02-11 PROCEDURE — 76817 TRANSVAGINAL US OBSTETRIC: CPT | Mod: 26

## 2024-02-11 RX ORDER — ERTAPENEM SODIUM 1 G/1
1000 INJECTION, POWDER, LYOPHILIZED, FOR SOLUTION INTRAMUSCULAR; INTRAVENOUS ONCE
Refills: 0 | Status: COMPLETED | OUTPATIENT
Start: 2024-02-11 | End: 2024-02-11

## 2024-02-11 RX ORDER — ONDANSETRON 8 MG/1
4 TABLET, FILM COATED ORAL ONCE
Refills: 0 | Status: COMPLETED | OUTPATIENT
Start: 2024-02-11 | End: 2024-02-11

## 2024-02-11 RX ORDER — ERTAPENEM SODIUM 1 G/1
1000 INJECTION, POWDER, LYOPHILIZED, FOR SOLUTION INTRAMUSCULAR; INTRAVENOUS EVERY 24 HOURS
Refills: 0 | Status: DISCONTINUED | OUTPATIENT
Start: 2024-02-11 | End: 2024-02-15

## 2024-02-11 RX ORDER — SODIUM CHLORIDE 9 MG/ML
1000 INJECTION INTRAMUSCULAR; INTRAVENOUS; SUBCUTANEOUS
Refills: 0 | Status: DISCONTINUED | OUTPATIENT
Start: 2024-02-11 | End: 2024-02-12

## 2024-02-11 RX ORDER — FOLIC ACID 0.8 MG
1 TABLET ORAL DAILY
Refills: 0 | Status: DISCONTINUED | OUTPATIENT
Start: 2024-02-11 | End: 2024-02-15

## 2024-02-11 RX ORDER — ACETAMINOPHEN 500 MG
1000 TABLET ORAL ONCE
Refills: 0 | Status: COMPLETED | OUTPATIENT
Start: 2024-02-11 | End: 2024-02-11

## 2024-02-11 RX ORDER — CEFTRIAXONE 500 MG/1
1000 INJECTION, POWDER, FOR SOLUTION INTRAMUSCULAR; INTRAVENOUS EVERY 24 HOURS
Refills: 0 | Status: DISCONTINUED | OUTPATIENT
Start: 2024-02-11 | End: 2024-02-11

## 2024-02-11 RX ORDER — SODIUM CHLORIDE 9 MG/ML
1000 INJECTION INTRAMUSCULAR; INTRAVENOUS; SUBCUTANEOUS ONCE
Refills: 0 | Status: COMPLETED | OUTPATIENT
Start: 2024-02-11 | End: 2024-02-11

## 2024-02-11 RX ADMIN — SODIUM CHLORIDE 75 MILLILITER(S): 9 INJECTION INTRAMUSCULAR; INTRAVENOUS; SUBCUTANEOUS at 17:28

## 2024-02-11 RX ADMIN — SODIUM CHLORIDE 1000 MILLILITER(S): 9 INJECTION INTRAMUSCULAR; INTRAVENOUS; SUBCUTANEOUS at 13:53

## 2024-02-11 RX ADMIN — ERTAPENEM SODIUM 120 MILLIGRAM(S): 1 INJECTION, POWDER, LYOPHILIZED, FOR SOLUTION INTRAMUSCULAR; INTRAVENOUS at 15:22

## 2024-02-11 RX ADMIN — SODIUM CHLORIDE 125 MILLILITER(S): 9 INJECTION INTRAMUSCULAR; INTRAVENOUS; SUBCUTANEOUS at 23:58

## 2024-02-11 RX ADMIN — Medication 400 MILLIGRAM(S): at 13:53

## 2024-02-11 RX ADMIN — ONDANSETRON 4 MILLIGRAM(S): 8 TABLET, FILM COATED ORAL at 13:53

## 2024-02-11 NOTE — ED POST DISCHARGE NOTE - ADDITIONAL DOCUMENTATION
2/11 Shine Watkins PA-C:  Kory [id number]. Ucx 50-99k ESBL e.coli. Sensitivities show multidrug resistance. Macrobid and Augmentin w/ intermediate coverage. Bactrim sensitive but preg cat 6. Other options IV. Spoke w/ pt who reports continued urinary symptoms and light vaginal spotting. Pt will return to ED today. Pt may require admission for iv abx. Would consult obgyn and ID for abx recs. Pt was scheduled to see her OBGYN on Tuesday. 2/11 Shine Watkins PA-C:  Kory [id number]. Ucx 50-99k ESBL e.coli in setting of pregnancy. Sensitivities show multidrug resistance. Macrobid and Augmentin w/ intermediate coverage. Bactrim sensitive but preg cat 6. Other options IV. Spoke w/ pt who reports continued urinary symptoms and light vaginal spotting. Pt advised to return to ED and she states she will come back to ED today. Pt may require admission for iv abx. Would consult obgyn and ID for abx recs. Pt was scheduled to see her OBGYN on Tuesday. 2/11 Shine Watkins PA-C:  Kory [id number]. Ucx 50-99k ESBL e.coli in setting of pregnancy. Sensitivities show multidrug resistance. Macrobid and Augmentin w/ intermediate coverage. Bactrim sensitive but preg cat C. Other options IV. Spoke w/ pt who reports continued urinary symptoms and light vaginal spotting. Pt advised to return to ED and she states she will come back to ED today. Pt may require admission for iv abx. Would consult obgyn and ID for abx recs. Pt was scheduled to see her OBGYN on Tuesday.

## 2024-02-11 NOTE — ED ADULT NURSE NOTE - OBJECTIVE STATEMENT
34 y/o female with PMH of migraines arrives to the ER complaining of lower abdominal pain. Pt reports being 5 weeks pregnant, LMP 12/15,  was here last week for lower abdominal pain, d/c and sent home on keflex for UTI. Today was called back to ED because sensitivities show multi drug resistance. Pt reports lower abdominal pain, some vaginal bleeding on Wednesday, endorses nausea and vomiting. Reports worsening pain over the last few days.  Pt reports first pregnancy was high risk , developing preeclampsia and placenta previa, having a vaginal delivery.  On assessment pt is well appearing, A&Ox4, speaking coherently, airway is patent, breathing spontaneously and unlabored. Skin is dry, warm. Abdomen is soft, no distended, lower abdominal tenderness. Full ROM in all extremities.  Patient undressed and placed into gown, side rails up with bed locked and in lowest position for safety.  [place] provided. Comfort and safety provided. will continue to reassess.

## 2024-02-11 NOTE — ED PROVIDER NOTE - ATTENDING CONTRIBUTION TO CARE
Raj Serra MD (Attending Physician):    I performed a history and physical exam of the patient and discussed their management with the resident/fellow/ACP/student. I have reviewed the resident/fellow/ACP/student note and agree with the documented findings and plan of care, except as noted. I have personally performed a substantive portion of the visit including all aspects of the medical decision making. My medical decision making and observations are found below. Please refer to any progress notes for updates on clinical course.    HPI:  Patient is a 33-year-old no past medical history  returning to the ED after positive urine cultures for ESBL.  Patient was in the emergency room a few days ago after having some cramping and vaginal bleeding.  Patient at that time had intrauterine pregnancy confirmed at 5 weeks gestation.  Follow-up OB/GYN appointment scheduled for this Tuesday.  Patient at that time was also told she has a urinary tract infection, was prescribed antibiotics.  On culture results today and showed that her strain was resistant to multiple antibiotics and was told to return to the emergency room.  Since being home patient has a lot of suprapubic discomfort, bilateral back pain.  Also with a lot of nausea at home, no fevers, no vomiting.    PE:  GEN - +In mild discomfort, A&Ox3  HEAD - NC/AT  EYES - PERRL, EOMI  ENT - Airway patent, +mucous membranes dry  PULMONARY - CTA b/l, symmetric breath sounds, no W/R/R  CARDIAC - +S1S2, RRR, no M/G/R, no JVD  ABDOMEN - +BS, ND, +TTP diffusely (worst in b/l lower abd), soft, no guarding, no rebound, no masses, no rigidity   - +B/l CVA TTP  EXTREMITIES - FROM, symmetric pulses, no edema  SKIN - No rash or bruising  NEUROLOGIC - Alert, speech clear, no focal deficits  PSYCH - Normal mood/affect, normal insight    MDM:  DDx includes, but not limited to: UTI/pyelonephritis, kidney stone, pancreatitis, ectopic pregnancy, miscarriage, ovarian torsion, viral syndrome. TVUS, US kidney and bladder, labs, urine, zofran, tylenol, IVF, ertapenem, possible ob/gyn consult. Will most likely require admission for IV abx for ESBL UTI/pyelonephritis.

## 2024-02-11 NOTE — H&P ADULT - NSHPLABSRESULTS_GEN_ALL_CORE
LABS:                        13.1   9.80  )-----------( 230      ( 11 Feb 2024 13:57 )             40.3     02-11    141  |  106  |  7   ----------------------------<  92  4.4   |  22  |  0.61    Ca    9.0      11 Feb 2024 13:59    TPro  7.3  /  Alb  4.2  /  TBili  0.2  /  DBili  x   /  AST  13  /  ALT  16  /  AlkPhos  55  02-11      Urinalysis Basic - ( 11 Feb 2024 13:59 )    Color: x / Appearance: x / SG: x / pH: x  Gluc: 92 mg/dL / Ketone: x  / Bili: x / Urobili: x   Blood: x / Protein: x / Nitrite: x   Leuk Esterase: x / RBC: x / WBC x   Sq Epi: x / Non Sq Epi: x / Bacteria: x          RADIOLOGY & ADDITIONAL TESTS:

## 2024-02-11 NOTE — ED ADULT TRIAGE NOTE - RESPIRATORY RATE (BREATHS/MIN)
Pts PIV infiltrated. New IV established, fluids continued. Pt brought to bathroom by WC and then to [address] with all belongings.    16

## 2024-02-11 NOTE — ED PROVIDER NOTE - PROGRESS NOTE DETAILS
Adali Sanchez MD PGY3: No hydro on renal ultrasound, +6 week live pregnancy. Will treat for ESBL pyelonephritis in pregnancy. TBA medicine. OBGYN to follow.

## 2024-02-11 NOTE — H&P ADULT - HISTORY OF PRESENT ILLNESS
33-year-old female no past medical history  returning to the ED after positive urine cultures for ESBL.  Patient was in the emergency room a few days ago after having some cramping and vaginal bleeding.  Patient at that time had intrauterine pregnancy confirmed at 5 weeks gestation.  Follow-up OB/GYN appointment scheduled for this Tuesday.  Patient at that time was also told she has a urinary tract infection, was prescribed antibiotics.  On culture results today and showed that her strain was resistant to multiple antibiotics and was told to return to the emergency room.  Since being home patient has a lot of suprapubic discomfort, bilateral back pain.  Also with a lot of nausea at home, no fevers, no vomiting.

## 2024-02-11 NOTE — H&P ADULT - NSHPPHYSICALEXAM_GEN_ALL_CORE
PHYSICAL EXAMINATION:  Vital Signs Last 24 Hrs  T(C): 36.7 (11 Feb 2024 12:39), Max: 36.7 (11 Feb 2024 12:39)  T(F): 98 (11 Feb 2024 12:39), Max: 98 (11 Feb 2024 12:39)  HR: 68 (11 Feb 2024 16:13) (68 - 82)  BP: 98/59 (11 Feb 2024 16:13) (91/57 - 98/59)  BP(mean): --  RR: 16 (11 Feb 2024 16:13) (16 - 18)  SpO2: 100% (11 Feb 2024 16:13) (97% - 100%)    Parameters below as of 11 Feb 2024 16:13  Patient On (Oxygen Delivery Method): room air      CAPILLARY BLOOD GLUCOSE          GENERAL: NAD,   ENMT: mucous membranes moist  NECK: supple, No JVD  CHEST/LUNG: clear to auscultation bilaterally; no rales, rhonchi, or wheezing b/l  HEART: normal S1, S2  ABDOMEN: BS+, soft, ND, NT   EXTREMITIES:  pulses palpable; no clubbing, cyanosis, or edema b/l LEs  NEURO: awake, alert, interactive; moves all extremities PHYSICAL EXAMINATION:  Vital Signs Last 24 Hrs  T(C): 36.7 (11 Feb 2024 12:39), Max: 36.7 (11 Feb 2024 12:39)  T(F): 98 (11 Feb 2024 12:39), Max: 98 (11 Feb 2024 12:39)  HR: 68 (11 Feb 2024 16:13) (68 - 82)  BP: 98/59 (11 Feb 2024 16:13) (91/57 - 98/59)  BP(mean): --  RR: 16 (11 Feb 2024 16:13) (16 - 18)  SpO2: 100% (11 Feb 2024 16:13) (97% - 100%)    Parameters below as of 11 Feb 2024 16:13  Patient On (Oxygen Delivery Method): room air      CAPILLARY BLOOD GLUCOSE          GENERAL: NAD, seen in ER, comfortable, GYN interviewing pt with ,  is at bedside.   ENMT: mucous membranes moist  NECK: supple, No JVD  CHEST/LUNG: clear to auscultation bilaterally; no rales, rhonchi, or wheezing b/l  HEART: normal S1, S2  ABDOMEN: BS+, soft, ND, NT   EXTREMITIES:  pulses palpable; no clubbing, cyanosis, or edema b/l LEs  NEURO: awake, alert, interactive; moves all extremities

## 2024-02-11 NOTE — ED PROVIDER NOTE - CLINICAL SUMMARY MEDICAL DECISION MAKING FREE TEXT BOX
Patient is a 33-year-old no past medical history  returning to the ED after positive urine cultures for ESBL. Patient overall well-appearing, however bilateral CVA tenderness, suprapubic tenderness.  Review of results shows that ultrasound could not find a heartbeat, however pregnancy is early.  Will trend hCG and repeat ultrasound today.  Patient with possible impending .  Will get repeat urinalysis, will give ertapenem per sensitivity results and efficacy in ESBL infection.  Will get labs, renal ultrasound, rule out hydronephrosis.  If hydronephrosis present would consider renal stone.  Symptoms more consistent with pyelonephritis. Will get CT A/P r/o diverticulitis, low suspicion at this time, no diarrhea.

## 2024-02-11 NOTE — CONSULT NOTE ADULT - ASSESSMENT
33-year-old  at 6w2d by CRL (LMP 12/15/23) returns to the ED after urine culture from prior ED visit grew multi-drug resistant ESBL E coli. Pt admitted to medicine for pyelonephritis. GYN consulted for early pregnancy.     - Continue ertapenem per primary team  - Start prenatal vitamins  - IV hydration   - Workup of SOB/palpitations per primary team      OB will continue to follow    d/w Dr. Jamie Paz PGY2 33-year-old  at 6w2d by CRL (LMP 12/15/23) returns to the ED after urine culture from prior ED visit grew multi-drug resistant ESBL E coli. Pt admitted to medicine for pyelonephritis. GYN consulted for early pregnancy.     - Continue ertapenem per primary team  - Start prenatal vitamins  - IV hydration   - Workup of SOB/palpitations per primary team      OB will continue to follow    d/w Dr. Jamie Paz PGY2    Agree with above. Treatment of pyelonephritis as per medicine. No OB interventions needed at this time. OB team will follow and is available if needed.    Jignesh Ayala MD

## 2024-02-11 NOTE — ED PROVIDER NOTE - OBJECTIVE STATEMENT
Patient is a 33-year-old no past medical history  returning to the ED after positive urine cultures for ESBL.  Patient was in the emergency room a few days ago after having some cramping and vaginal bleeding.  Patient at that time had intrauterine pregnancy confirmed at 5 weeks gestation.  Follow-up OB/GYN appointment scheduled for this Tuesday.  Patient at that time was also told she has a urinary tract infection, was prescribed antibiotics.  On culture results today and showed that her strain was resistant to multiple antibiotics and was told to return to the emergency room.  Since being home patient has a lot of suprapubic discomfort, bilateral back pain.  Also with a lot of nausea at home, no fevers, no vomiting.

## 2024-02-11 NOTE — ED PROVIDER NOTE - CARE PLAN
Principal Discharge DX:	Pyelonephritis   1 Principal Discharge DX:	Pyelonephritis  Secondary Diagnosis:	Pregnancy

## 2024-02-11 NOTE — ED PROVIDER NOTE - PHYSICAL EXAMINATION
GEN - +In mild discomfort, A&Ox3  HEAD - NC/AT  EYES - PERRL, EOMI  ENT - Airway patent, +mucous membranes dry  PULMONARY - CTA b/l, symmetric breath sounds, no W/R/R  CARDIAC - +S1S2, RRR, no M/G/R, no JVD  ABDOMEN - +BS, ND, +TTP diffusely (worst in b/l lower abd), soft, no guarding, no rebound, no masses, no rigidity   - +B/l CVA TTP  EXTREMITIES - FROM, symmetric pulses, no edema  SKIN - No rash or bruising  NEUROLOGIC - Alert, speech clear, no focal deficits  PSYCH - Normal mood/affect, normal insight

## 2024-02-11 NOTE — CONSULT NOTE ADULT - SUBJECTIVE AND OBJECTIVE BOX
GYN Consult Note    EDUARDO BRENNER  33y  Female [id number]    HPI:  33-year-old  at 6w2d by CRL (LMP 12/15/23) returns to the ED after urine culture from prior ED visit grew multi-drug resistant ESBL E coli. Pt initially presented on  with vaginal bleeding and lower abdominal cramping x2 days. She was discharged on Augmentin which only had partial sensitivity. Pt states that since the  her symptoms have persisted. Her vaginal bleeding has reduced and she is just spotting now. Pt endorses dysuria, nausea and chills. Denies hematuria, urinary frequency, fever, emesis. Pt also endorses SOB and palpitations at rest and with exertion which she says is chronic over the last 8 months.       Name of GYN Physician: Dr. Nesbitt at [facility]  OBHx:   x1 c/b PEC  GynHx: Denies h/o ovarian cyst. fibroids, endometriosis, STI's, Abnormal pap smears   PMH: migraines  PSH: none  Meds: unknown migraine medication   Allx: NKDA  Social History:  Denies smoking use, drug use, alcohol use.    Vital Signs Last 24 Hrs  T(C): 36.7 (2024 12:39), Max: 36.7 (2024 12:39)  T(F): 98 (2024 12:39), Max: 98 (2024 12:39)  HR: 68 (2024 16:13) (68 - 82)  BP: 98/59 (2024 16:13) (91/57 - 98/59)  BP(mean): --  RR: 16 (2024 16:13) (16 - 18)  SpO2: 100% (2024 16:13) (97% - 100%)    Parameters below as of 2024 16:13  Patient On (Oxygen Delivery Method): room air        Physical Exam:   General: sitting comfortably in bed, NAD   HEENT: full ROM  CV: well perfused  Lungs: breathing comfortably on RA  Back: bilateral CVA tenderness  Abd: Soft, LUQ and RLQ tenderness, non-distended.      :  No bleeding on pad.      Speculum Exam: deferred  Ext: non-tender b/l, no edema     LABS:                        13.1   9.80  )-----------( 230      ( 2024 13:57 )             40.3         141  |  106  |  7   ----------------------------<  92  4.4   |  22  |  0.61    Ca    9.0      2024 13:59    TPro  7.3  /  Alb  4.2  /  TBili  0.2  /  DBili  x   /  AST  13  /  ALT  16  /  AlkPhos  55      I&O's Detail      Urinalysis Basic - ( 2024 13:59 )    Color: x / Appearance: x / SG: x / pH: x  Gluc: 92 mg/dL / Ketone: x  / Bili: x / Urobili: x   Blood: x / Protein: x / Nitrite: x   Leuk Esterase: x / RBC: x / WBC x   Sq Epi: x / Non Sq Epi: x / Bacteria: x        RADIOLOGY & ADDITIONAL STUDIES:  < from: US Transvaginal, OB (24 @ 15:28) >  ACC: [id number] EXAM:  US DPLX PELVIC   ORDERED BY: NEW SOW     ACC: [id number] EXAM:  US OB LES THAN 14 WKS 1ST GEST   ORDERED BY:   NEW SOW     ACC: [id number] EXAM:  US OB TRANSVAGINAL   ORDERED BY: NEW SOW     PROCEDURE DATE:2024          INTERPRETATION:  CLINICAL INFORMATION: Suprapubic pain. Positive   pregnancy. Assess heartbeat. Recent UTI.    LMP: 2023    Estimated Gestational Age by LMP: 9 weeks 5 days    COMPARISON: Pelvic ultrasound 2024.    Endovaginal and transabdominal pelvic sonogram. Color and Spectral   Doppler was performed.    FINDINGS:  Uterus: 8.9 x 5.5 x 8.1 cm. Subchorionic hematoma measuring 1.1 x 0.6 x   0.7, previously 1.2 x 0.9 x 0.5 cm. Question septated versus arcuate   uterus. Intrauterine gestation is seen adjacent to the septum.    Gestational Sac Size (mean): 1.3 cm  Gestational Sac Shape : Normal.  Crown Rump Length: 0.53 cm  Estimated Gestational Age: 6 week 2 days by crown rump length.  Yolk Sac: Normal  Fetal Heart Rate: 114 bpm    Right ovary: 2.9 cm x 2.7 cm x 2.6 cm. Within normal limits. Normal   arterial and venous waveforms.  Left ovary: Only visualized on transabdominal view. 3.8 cm x 2.3 cm x 2.3   cm. Within normal limits. Normal arterial and venous waveforms.    Fluid: None.    Miscellanous: Internal echoes and debris within the urinary bladder which   may be related to known UTI.    IMPRESSION:  Single live intrauterine gestation is seen adjacent to an intrauterine   septum.  Estimated gestational age of 6 week 2 days by crown rump length.  Estimated due date of  10/5/2024.    Subchorionic hematoma measuring 1.1 x 0.6 x 0.7 cm.    --- End of Report ---          TRINIDAD MILLER MD; Resident Radiologist  This document has been electronically signed.  CHELSEA NAYLOR MD; Attending Radiologist  This document has been electronically signed. 2024  4:16PM    < end of copied text >     GYN Consult Note    EDUARDO BRENNER  33y  Female [id number]    HPI:  33-year-old  at 6w2d by CRL (LMP 12/15/23) returns to the ED after urine culture from prior ED visit grew multi-drug resistant ESBL E coli. Pt initially presented on  with vaginal bleeding and lower abdominal cramping x2 days. She was discharged on Augmentin which only had partial sensitivity. Pt states that since the  her symptoms have persisted. Her vaginal bleeding has reduced and she is just spotting now. Pt endorses dysuria, nausea and chills. Denies hematuria, urinary frequency, fever, emesis. Pt also endorses SOB and palpitations at rest and with exertion which she says is chronic over the last 8 months.       Name of GYN Physician: Dr. Nesbitt at [facility]  OBHx:   x1 c/b PEC  GynHx: Denies h/o ovarian cyst. fibroids, endometriosis, STI's, Abnormal pap smears   PMH: migraines  PSH: none  Meds: unknown migraine medication   Allx: NKDA  Social History:  Denies smoking use, drug use, alcohol use.    Vital Signs Last 24 Hrs  T(C): 36.7 (2024 12:39), Max: 36.7 (2024 12:39)  T(F): 98 (2024 12:39), Max: 98 (2024 12:39)  HR: 68 (2024 16:13) (68 - 82)  BP: 98/59 (2024 16:13) (91/57 - 98/59)  BP(mean): --  RR: 16 (2024 16:13) (16 - 18)  SpO2: 100% (2024 16:13) (97% - 100%)    Parameters below as of 2024 16:13  Patient On (Oxygen Delivery Method): room air        Physical Exam:   General: sitting comfortably in bed, NAD   HEENT: full ROM  CV: well perfused  Lungs: breathing comfortably on RA  Back: bilateral CVA tenderness  Abd: Soft, LUQ and RLQ tenderness, non-distended.      :  No bleeding on pad.      Speculum Exam: deferred  Ext: non-tender b/l, no edema     LABS:                        13.1   9.80  )-----------( 230      ( 2024 13:57 )             40.3         141  |  106  |  7   ----------------------------<  92  4.4   |  22  |  0.61    Ca    9.0      2024 13:59    TPro  7.3  /  Alb  4.2  /  TBili  0.2  /  DBili  x   /  AST  13  /  ALT  16  /  AlkPhos  55      I&O's Detail      Urinalysis Basic - ( 2024 13:59 )    Color: x / Appearance: x / SG: x / pH: x  Gluc: 92 mg/dL / Ketone: x  / Bili: x / Urobili: x   Blood: x / Protein: x / Nitrite: x   Leuk Esterase: x / RBC: x / WBC x   Sq Epi: x / Non Sq Epi: x / Bacteria: x        RADIOLOGY & ADDITIONAL STUDIES:  < from: US Transvaginal, OB (24 @ 15:28) >  ACC: [id number] EXAM:  US DPLX PELVIC   ORDERED BY: NEW SOW     ACC: [id number] EXAM:  US OB LES THAN 14 WKS 1ST GEST   ORDERED BY:   NEW SOW     ACC: [id number] EXAM:  US OB TRANSVAGINAL   ORDERED BY: NEW SOW     PROCEDURE DATE:2024          INTERPRETATION:  CLINICAL INFORMATION: Suprapubic pain. Positive   pregnancy. Assess heartbeat. Recent UTI.    LMP: 2023    Estimated Gestational Age by LMP: 9 weeks 5 days    COMPARISON: Pelvic ultrasound 2024.    Endovaginal and transabdominal pelvic sonogram. Color and Spectral   Doppler was performed.    FINDINGS:  Uterus: 8.9 x 5.5 x 8.1 cm. Subchorionic hematoma measuring 1.1 x 0.6 x   0.7, previously 1.2 x 0.9 x 0.5 cm. Question septated versus arcuate   uterus. Intrauterine gestation is seen adjacent to the septum.    Gestational Sac Size (mean): 1.3 cm  Gestational Sac Shape : Normal.  Crown Rump Length: 0.53 cm  Estimated Gestational Age: 6 week 2 days by crown rump length.  Yolk Sac: Normal  Fetal Heart Rate: 114 bpm    Right ovary: 2.9 cm x 2.7 cm x 2.6 cm. Within normal limits. Normal   arterial and venous waveforms.  Left ovary: Only visualized on transabdominal view. 3.8 cm x 2.3 cm x 2.3   cm. Within normal limits. Normal arterial and venous waveforms.    Fluid: None.    Miscellanous: Internal echoes and debris within the urinary bladder which   may be related to known UTI.    IMPRESSION:  Single live intrauterine gestation is seen adjacent to an intrauterine   septum.  Estimated gestational age of 6 week 2 days by crown rump length.  Estimated due date of  10/5/2024.    Subchorionic hematoma measuring 1.1 x 0.6 x 0.7 cm.    --- End of Report ---          TRINIDAD MILLER MD; Resident Radiologist  This document has been electronically signed.  CHELSEA NAYLOR MD; Attending Radiologist  This document has been electronically signed. 2024  4:16PM    < end of copied text >

## 2024-02-11 NOTE — H&P ADULT - ASSESSMENT
33-year-old female no past medical history  returning to the ED after positive urine cultures for ESBL.  Patient was in the emergency room a few days ago after having some cramping and vaginal bleeding.  Patient at that time had intrauterine pregnancy confirmed at 5 weeks gestation.  Follow-up OB/GYN appointment scheduled for this Tuesday.  Patient at that time was also told she has a urinary tract infection, was prescribed antibiotics.  On culture results today and showed that her strain was resistant to multiple antibiotics and was told to return to the emergency room.  Since being home patient has a lot of suprapubic discomfort, bilateral back pain.  Also with a lot of nausea at home, no fevers, no vomiting.    Plan:   33-year-old female no past medical history  returning to the ED after positive urine cultures for ESBL.  Patient was in the emergency room a few days ago after having some cramping and vaginal bleeding.  Patient at that time had intrauterine pregnancy confirmed at 5 weeks gestation.  Follow-up OB/GYN appointment scheduled for this Tuesday.  Patient at that time was also told she has a urinary tract infection, was prescribed antibiotics.  On culture results today and showed that her strain was resistant to multiple antibiotics and was told to return to the emergency room.  Since being home patient has a lot of suprapubic discomfort, bilateral back pain.  Also with a lot of nausea at home, no fevers, no vomiting.      Plan:  Admit to medicine for possible pylonephritis, pregnant at 6 weeks. IVF, IV [employer]. Urine culture notes ESBL, resistent to Ceftriaxone. Serum HCG at 29,525 consistent with 6     week pregnancy. Transvaginal sono notes pregnancy stated age. Cannot have CT. GYN will follow as consult, agree with [employer].      Takes no meds at home, no prior medical problems.

## 2024-02-11 NOTE — ED ADULT NURSE REASSESSMENT NOTE - NS ED NURSE REASSESS COMMENT FT1
Report received from Zaida GRANT. Pt A&Ox4, in no acute distress at time. Patient primarily Citizen of [place] speaking;  services utilized Darren ID: [id number]. Family remains at bedside. Pt awaiting bed assignment. Patient safety maintained, bed is in lowest position, wheels locked, and side rails raised.

## 2024-02-11 NOTE — ED ADULT TRIAGE NOTE - CHIEF COMPLAINT QUOTE
LMP 12/15/23, +pregnant, started on keflex for uti, called back to ED because sensitivities show multi drug resistance, may need IV abx

## 2024-02-12 ENCOUNTER — TRANSCRIPTION ENCOUNTER (OUTPATIENT)
Age: 34
End: 2024-02-12

## 2024-02-12 LAB
CULTURE RESULTS: NO GROWTH — SIGNIFICANT CHANGE UP
HCT VFR BLD CALC: 34.6 % — SIGNIFICANT CHANGE UP (ref 34.5–45)
HGB BLD-MCNC: 11.4 G/DL — LOW (ref 11.5–15.5)
MCHC RBC-ENTMCNC: 29.4 PG — SIGNIFICANT CHANGE UP (ref 27–34)
MCHC RBC-ENTMCNC: 32.9 GM/DL — SIGNIFICANT CHANGE UP (ref 32–36)
MCV RBC AUTO: 89.2 FL — SIGNIFICANT CHANGE UP (ref 80–100)
NRBC # BLD: 0 /100 WBCS — SIGNIFICANT CHANGE UP (ref 0–0)
PLATELET # BLD AUTO: 204 K/UL — SIGNIFICANT CHANGE UP (ref 150–400)
RBC # BLD: 3.88 M/UL — SIGNIFICANT CHANGE UP (ref 3.8–5.2)
RBC # FLD: 12.6 % — SIGNIFICANT CHANGE UP (ref 10.3–14.5)
SPECIMEN SOURCE: SIGNIFICANT CHANGE UP
WBC # BLD: 10.45 K/UL — SIGNIFICANT CHANGE UP (ref 3.8–10.5)
WBC # FLD AUTO: 10.45 K/UL — SIGNIFICANT CHANGE UP (ref 3.8–10.5)

## 2024-02-12 RX ORDER — SODIUM CHLORIDE 9 MG/ML
1000 INJECTION INTRAMUSCULAR; INTRAVENOUS; SUBCUTANEOUS
Refills: 0 | Status: DISCONTINUED | OUTPATIENT
Start: 2024-02-12 | End: 2024-02-13

## 2024-02-12 RX ORDER — ACETAMINOPHEN 500 MG
650 TABLET ORAL ONCE
Refills: 0 | Status: COMPLETED | OUTPATIENT
Start: 2024-02-12 | End: 2024-02-12

## 2024-02-12 RX ORDER — INFLUENZA VIRUS VACCINE 15; 15; 15; 15 UG/.5ML; UG/.5ML; UG/.5ML; UG/.5ML
0.5 SUSPENSION INTRAMUSCULAR ONCE
Refills: 0 | Status: DISCONTINUED | OUTPATIENT
Start: 2024-02-12 | End: 2024-02-15

## 2024-02-12 RX ADMIN — Medication 650 MILLIGRAM(S): at 20:53

## 2024-02-12 RX ADMIN — ERTAPENEM SODIUM 120 MILLIGRAM(S): 1 INJECTION, POWDER, LYOPHILIZED, FOR SOLUTION INTRAMUSCULAR; INTRAVENOUS at 16:17

## 2024-02-12 RX ADMIN — Medication 1 TABLET(S): at 13:01

## 2024-02-12 RX ADMIN — Medication 1 MILLIGRAM(S): at 13:01

## 2024-02-12 RX ADMIN — Medication 650 MILLIGRAM(S): at 21:20

## 2024-02-12 RX ADMIN — SODIUM CHLORIDE 125 MILLILITER(S): 9 INJECTION INTRAMUSCULAR; INTRAVENOUS; SUBCUTANEOUS at 07:28

## 2024-02-12 NOTE — PATIENT PROFILE ADULT - CONTRAINDICATIONS & PRECAUTIONS (SELECT ALL THAT APPLY)
none... Complex Repair And Split-Thickness Skin Graft Text: The defect edges were debeveled with a #15 scalpel blade.  The primary defect was closed partially with a complex linear closure.  Given the location of the defect, shape of the defect and the proximity to free margins a split thickness skin graft was deemed most appropriate to repair the remaining defect.  The graft was trimmed to fit the size of the remaining defect.  The graft was then placed in the primary defect, oriented appropriately, and sutured into place.

## 2024-02-12 NOTE — PATIENT PROFILE ADULT - FALL HARM RISK - RISK INTERVENTIONS

## 2024-02-12 NOTE — PROGRESS NOTE ADULT - ASSESSMENT
33-year-old female no past medical history  returning to the ED after positive urine cultures for ESBL.  Patient was in the emergency room a few days ago after having some cramping and vaginal bleeding.  Patient at that time had intrauterine pregnancy confirmed at 5 weeks gestation.  Follow-up OB/GYN appointment scheduled for this Tuesday.  Patient at that time was also told she has a urinary tract infection, was prescribed antibiotics.  On culture results today and showed that her strain was resistant to multiple antibiotics and was told to return to the emergency room.  Since being home patient has a lot of suprapubic discomfort, bilateral back pain.  Also with a lot of nausea at home, no fevers, no vomiting.      Plan:  Admit to medicine for possible pylonephritis, pregnant at 6 weeks. IVF, IV Invancz. Urine culture notes ESBL, resistent to Ceftriaxone. Serum HCG at 29,525 consistent with 6     week pregnancy. Transvaginal sono notes pregnancy stated age. Cannot have CT. GYN will follow as consult, agree with Invancz.      Takes no meds at home, no prior medical problems.

## 2024-02-12 NOTE — ED ADULT NURSE REASSESSMENT NOTE - RESPONSE TO
response to care/treatments:
Detail Level: Zone
General Sunscreen Counseling: I recommended a broad spectrum sunscreen with a SPF of 30 or higher.  I explained that SPF 30 sunscreens block approximately 97 percent of the sun's harmful rays.  Sunscreens should be applied at least 15 minutes prior to expected sun exposure and then every 2 hours after that as long as sun exposure continues. If swimming or exercising sunscreen should be reapplied every 45 minutes to an hour after getting wet or sweating.  One ounce, or the equivalent of a shot glass full of sunscreen, is adequate to protect the skin not covered by a bathing suit. I also recommended a lip balm with a sunscreen as well. Sun protective clothing can be used in lieu of sunscreen but must be worn the entire time you are exposed to the sun's rays.

## 2024-02-13 ENCOUNTER — APPOINTMENT (OUTPATIENT)
Dept: OBGYN | Facility: CLINIC | Age: 34
End: 2024-02-13

## 2024-02-13 PROCEDURE — 99222 1ST HOSP IP/OBS MODERATE 55: CPT

## 2024-02-13 RX ORDER — SODIUM CHLORIDE 9 MG/ML
1000 INJECTION INTRAMUSCULAR; INTRAVENOUS; SUBCUTANEOUS
Refills: 0 | Status: DISCONTINUED | OUTPATIENT
Start: 2024-02-13 | End: 2024-02-14

## 2024-02-13 RX ADMIN — Medication 1 TABLET(S): at 13:40

## 2024-02-13 RX ADMIN — Medication 1 MILLIGRAM(S): at 13:40

## 2024-02-13 RX ADMIN — SODIUM CHLORIDE 60 MILLILITER(S): 9 INJECTION INTRAMUSCULAR; INTRAVENOUS; SUBCUTANEOUS at 17:54

## 2024-02-13 RX ADMIN — ERTAPENEM SODIUM 120 MILLIGRAM(S): 1 INJECTION, POWDER, LYOPHILIZED, FOR SOLUTION INTRAMUSCULAR; INTRAVENOUS at 15:48

## 2024-02-13 NOTE — CONSULT NOTE ADULT - SUBJECTIVE AND OBJECTIVE BOX
HPI:  33-year-old female no past medical history  returning to the ED after positive urine cultures for ESBL.  Patient was in the emergency room a few days ago after having some cramping and vaginal bleeding.  Patient at that time had intrauterine pregnancy confirmed at 5 weeks gestation.  Follow-up OB/GYN appointment scheduled for this Tuesday.  Patient at that time was also told she has a urinary tract infection, was prescribed antibiotics.  On culture results today and showed that her strain was resistant to multiple antibiotics and was told to return to the emergency room.  Since being home patient has a lot of suprapubic discomfort, bilateral back pain.  Also with a lot of nausea at home, no fevers, no vomiting. (2024 17:21)      PAST MEDICAL & SURGICAL HISTORY:  Migraines          Allergies    No Known Allergies    Intolerances        ANTIMICROBIALS:  ertapenem  IVPB 1000 every 24 hours      OTHER MEDS:  folic acid 1 milliGRAM(s) Oral daily  influenza   Vaccine 0.5 milliLiter(s) IntraMuscular once  prenatal multivitamin 1 Tablet(s) Oral daily  sodium chloride 0.9%. 1000 milliLiter(s) IV Continuous <Continuous>      SOCIAL HISTORY:    no smoking, alcohol or drug abuse  no recent travel    FAMILY HISTORY:      ROS:  Unobtainable because:   All other systems negative     Constitutional: no fever, no chills, no weight loss, no night sweats  Eye: no eye pain, no redness, no vision changes  ENT:  no sore throat, no rhinorrhea  Cardiovascular:  no chest pain, no palpitation  Respiratory:  no SOB, no cough  GI:  no abd pain, no vomiting, no diarrhea  urinary: no dysuria, no hematuria, no flank pain  : no  discharge or bleeding  musculoskeletal:  no joint pain, no joint swelling  skin:  no rash  neurology:  no headache, no seizure, no change in mental status  psych: no anxiety, no depression     Physical Exam:    General:    NAD, non toxic  Head: atraumatic, normocephalic  Eyes: normal sclera and conjunctiva  ENT:   no oropharyngeal lesions, no LAD, neck supple  Cardio:    regular S1,S2, no murmur  Respiratory:   clear b/l, no wheezing  abd:   soft, BS +, not tender  :     no CVAT, no suprapubic tenderness, no luther  Musculoskeletal : no joint swelling, no edema  Skin:    no rash  vascular: no phlebitis  Neurologic:     no focal deficits  psych: normal affect      Drug Dosing Weight  Height (cm): 152.4 (2024 17:14)  Weight (kg): 60.8 (2024 17:14)  BMI (kg/m2): 26.2 (2024 17:14)  BSA (m2): 1.57 (2024 17:14)    Vital Signs Last 24 Hrs  T(F): 98.3 (24 @ 11:38), Max: 99.2 (24 @ 18:37)    Vital Signs Last 24 Hrs  HR: 71 (24 @ 11:38) (63 - 77)  BP: 100/63 (24 @ 11:38) (89/56 - 100/63)  RR: 16 (24 @ 11:38)  SpO2: 99% (24 @ 11:38) (99% - 100%)  Wt(kg): --                          11.4   10.45 )-----------( 204      ( 2024 06:10 )             34.6           141  |  106  |  7   ----------------------------<  92  4.4   |  22  |  0.61    Ca    9.0      2024 13:59    TPro  7.3  /  Alb  4.2  /  TBili  0.2  /  DBili  x   /  AST  13  /  ALT  16  /  AlkPhos  55  11      Urinalysis Basic - ( 2024 13:59 )    Color: x / Appearance: x / SG: x / pH: x  Gluc: 92 mg/dL / Ketone: x  / Bili: x / Urobili: x   Blood: x / Protein: x / Nitrite: x   Leuk Esterase: x / RBC: x / WBC x   Sq Epi: x / Non Sq Epi: x / Bacteria: x        MICROBIOLOGY:  v  Clean Catch Clean Catch (Midstream)  24   No growth  --  --      .Blood Blood-Peripheral  24   No growth at 24 hours  --  --      .Blood Blood-Peripheral  24   No growth at 24 hours  --  --      Clean Catch Clean Catch (Midstream)  24   50,000 - 99,000 CFU/mL Escherichia coli ESBL  <10,000 CFU/ml Normal Urogenital juan jose present  --  Escherichia coli ESBL                  RADIOLOGY:    Images independently visualized and reviewed personally,  findings as below    < from: US OB <=14 Weeks, First Gestation (24 @ 15:29) >  Single live intrauterine gestation is seen adjacent to an intrauterine   septum.  Estimated gestational age of 6 week 2 days by crown rump length.  Estimated due date of  10/5/2024.    Subchorionic hematoma measuring 1.1 x 0.6 x 0.7 cm.    < end of copied text >  < from: US Kidney and Bladder (24 @ 15:27) >  IMPRESSION:    No hydronephrosis.    < end of copied text >   HPI:  33-year-old female no past medical history  returning to the ED after positive urine cultures for ESBL.  Patient was in the emergency room a few days ago after having some cramping and vaginal bleeding.  Patient at that time had intrauterine pregnancy confirmed at 5 weeks gestation.  Follow-up OB/GYN appointment scheduled for this Tuesday.  Patient at that time was also told she has a urinary tract infection, was prescribed antibiotics.  On culture results today and showed that her strain was resistant to multiple antibiotics and was told to return to the emergency room.  Since being home patient has a lot of suprapubic discomfort, bilateral back pain.  Also with a lot of nausea at home, no fevers, no vomiting. (2024 17:21)      PAST MEDICAL & SURGICAL HISTORY:  Migraines          Allergies    No Known Allergies    Intolerances        ANTIMICROBIALS:  ertapenem  IVPB 1000 every 24 hours      OTHER MEDS:  folic acid 1 milliGRAM(s) Oral daily  influenza   Vaccine 0.5 milliLiter(s) IntraMuscular once  prenatal multivitamin 1 Tablet(s) Oral daily  sodium chloride 0.9%. 1000 milliLiter(s) IV Continuous <Continuous>      SOCIAL HISTORY:  lives with family  no smoking, alcohol or drug abuse  no recent travel    FAMILY HISTORY:  no recent febrile illness in family members    ROS:    All other systems negative     Constitutional: no fever, no chills, no weight loss, no night sweats  Eye: no eye pain, no redness, no vision changes  ENT:  no sore throat, no rhinorrhea  Cardiovascular:  no chest pain, no palpitation  Respiratory:  no SOB, no cough  GI:  no abd pain, no vomiting, no diarrhea  urinary: no dysuria, no hematuria, no flank pain  : had lower abd cramps and vaginal bleeding/spotting but not now  musculoskeletal:  no joint pain, no joint swelling  skin:  no rash  neurology:  no headache, no seizure, no change in mental status  psych: no anxiety, no depression     Physical Exam:    General:    NAD, non toxic  Head: atraumatic, normocephalic  Eyes: normal sclera and conjunctiva  ENT:   no oropharyngeal lesions, no LAD, neck supple  Cardio:    regular S1,S2  Respiratory:   clear b/l, no wheezing  abd:   soft, BS +, not tender  :     no CVAT, no suprapubic tenderness, no luther  Musculoskeletal : no joint swelling, no edema  Skin:    no rash  vascular: no phlebitis  Neurologic:     no focal deficits  psych: normal affect      Drug Dosing Weight  Height (cm): 152.4 (2024 17:14)  Weight (kg): 60.8 (2024 17:14)  BMI (kg/m2): 26.2 (2024 17:14)  BSA (m2): 1.57 (2024 17:14)    Vital Signs Last 24 Hrs  T(F): 98.3 (24 @ 11:38), Max: 99.2 (24 @ 18:37)    Vital Signs Last 24 Hrs  HR: 71 (24 @ 11:38) (63 - 77)  BP: 100/63 (24 @ 11:38) (89/56 - 100/63)  RR: 16 (24 @ 11:38)  SpO2: 99% (24 @ 11:38) (99% - 100%)  Wt(kg): --                          11.4   10.45 )-----------( 204      ( 2024 06:10 )             34.6           141  |  106  |  7   ----------------------------<  92  4.4   |  22  |  0.61    Ca    9.0      2024 13:59    TPro  7.3  /  Alb  4.2  /  TBili  0.2  /  DBili  x   /  AST  13  /  ALT  16  /  AlkPhos  55  11      Urinalysis Basic - ( 2024 13:59 )    Color: x / Appearance: x / SG: x / pH: x  Gluc: 92 mg/dL / Ketone: x  / Bili: x / Urobili: x   Blood: x / Protein: x / Nitrite: x   Leuk Esterase: x / RBC: x / WBC x   Sq Epi: x / Non Sq Epi: x / Bacteria: x        MICROBIOLOGY:  v  Clean Catch Clean Catch (Midstream)  24   No growth  --  --      .Blood Blood-Peripheral  24   No growth at 24 hours  --  --      .Blood Blood-Peripheral  24   No growth at 24 hours  --  --      Clean Catch Clean Catch (Midstream)  02-08-24   50,000 - 99,000 CFU/mL Escherichia coli ESBL  <10,000 CFU/ml Normal Urogenital juan jose present  --  Escherichia coli ESBL                  RADIOLOGY:    Images independently visualized and reviewed personally,  findings as below    < from: US OB <=14 Weeks, First Gestation (24 @ 15:29) >  Single live intrauterine gestation is seen adjacent to an intrauterine   septum.  Estimated gestational age of 6 week 2 days by crown rump length.  Estimated due date of  10/5/2024.    Subchorionic hematoma measuring 1.1 x 0.6 x 0.7 cm.    < end of copied text >  < from: US Kidney and Bladder (24 @ 15:27) >  IMPRESSION:    No hydronephrosis.    < end of copied text >

## 2024-02-13 NOTE — PROGRESS NOTE ADULT - ASSESSMENT
33-year-old female no past medical history  returning to the ED after positive urine cultures for ESBL.  Patient was in the emergency room a few days ago after having some cramping and vaginal bleeding.  Patient at that time had intrauterine pregnancy confirmed at 5 weeks gestation.  Follow-up OB/GYN appointment scheduled for this Tuesday.  Patient at that time was also told she has a urinary tract infection, was prescribed antibiotics.  On culture results today and showed that her strain was resistant to multiple antibiotics and was told to return to the emergency room.  Since being home patient has a lot of suprapubic discomfort, bilateral back pain.  Also with a lot of nausea at home, no fevers, no vomiting.      Plan:  Admit to medicine for possible pylonephritis, pregnant at 6 weeks. IVF, IV Invancz. Urine culture notes ESBL, resistent to Ceftriaxone. Serum HCG at 29,525 consistent with 6     week pregnancy. Transvaginal sono notes pregnancy stated age. Cannot have CT. GYN will follow as consult, agree with Invancz.      Takes no meds at home, no prior medical problems.    ID consult for today, appreciated.    33-year-old female no past medical history  returning to the ED after positive urine cultures for ESBL.  Patient was in the emergency room a few days ago after having some cramping and vaginal bleeding.  Patient at that time had intrauterine pregnancy confirmed at 5 weeks gestation.  Follow-up OB/GYN appointment scheduled for this Tuesday.  Patient at that time was also told she has a urinary tract infection, was prescribed antibiotics.  On culture results today and showed that her strain was resistant to multiple antibiotics and was told to return to the emergency room.  Since being home patient has a lot of suprapubic discomfort, bilateral back pain.  Also with a lot of nausea at home, no fevers, no vomiting.      Plan:  Admit to medicine for possible pylonephritis, pregnant at 6 weeks. IVF, IV Invancz. Urine culture notes ESBL, resistent to Ceftriaxone. Serum HCG at 29,525 consistent with 6     week pregnancy. Transvaginal sono notes pregnancy stated age. Cannot have CT. GYN will follow as consult, agree with Invancz.      Takes no meds at home, no prior medical problems.    ID consult for today, appreciated.   Will finish Invancz Thursday after noon and discharge home then, day 3.

## 2024-02-13 NOTE — CONSULT NOTE ADULT - ASSESSMENT
33 f came to ER 2/7 one day after she tested positive for pregnancy with lower abd cramp and vaginal bleeding, also said she had long standing dysuria, u/s showed intrauterine pregnancy, u/a was positive so pt was discharged with keflex but the urine cx came back with ESBL E-coli and pt was called back   afebrile, no WBC and no urinary symptoms now  u/a negative, urine cx negative    6 week pregnant with lower abd cramp and vaginal bleeding but also had ?dysuria 2/7 when she came to ER and was given keflex for UTI now u/a and urine cx negative no urinary symptoms but the urine cx from 2/7 showed ESBL E-coli ( S to bactrim bt pt pregnant)    * pt's symptoms likely pregnancy related but will have to treat for cystitis vs bacteriuria anyway (pt is currently asymptomatic)  * will complete a 5 days of ertapenem, started 2/11, now day 3  * please call with questions    The above assessment and plan was discussed with the primary team    Mechelle Hobson MD  contact on teams  After 5pm and on weekends call [phone]

## 2024-02-14 LAB
ANION GAP SERPL CALC-SCNC: 11 MMOL/L — SIGNIFICANT CHANGE UP (ref 5–17)
BUN SERPL-MCNC: 11 MG/DL — SIGNIFICANT CHANGE UP (ref 7–23)
CALCIUM SERPL-MCNC: 9.2 MG/DL — SIGNIFICANT CHANGE UP (ref 8.4–10.5)
CHLORIDE SERPL-SCNC: 105 MMOL/L — SIGNIFICANT CHANGE UP (ref 96–108)
CO2 SERPL-SCNC: 22 MMOL/L — SIGNIFICANT CHANGE UP (ref 22–31)
CREAT SERPL-MCNC: 0.68 MG/DL — SIGNIFICANT CHANGE UP (ref 0.5–1.3)
EGFR: 119 ML/MIN/1.73M2 — SIGNIFICANT CHANGE UP
GLUCOSE SERPL-MCNC: 89 MG/DL — SIGNIFICANT CHANGE UP (ref 70–99)
HCT VFR BLD CALC: 36 % — SIGNIFICANT CHANGE UP (ref 34.5–45)
HGB BLD-MCNC: 11.9 G/DL — SIGNIFICANT CHANGE UP (ref 11.5–15.5)
MCHC RBC-ENTMCNC: 29.3 PG — SIGNIFICANT CHANGE UP (ref 27–34)
MCHC RBC-ENTMCNC: 33.1 GM/DL — SIGNIFICANT CHANGE UP (ref 32–36)
MCV RBC AUTO: 88.7 FL — SIGNIFICANT CHANGE UP (ref 80–100)
NRBC # BLD: 0 /100 WBCS — SIGNIFICANT CHANGE UP (ref 0–0)
PLATELET # BLD AUTO: 218 K/UL — SIGNIFICANT CHANGE UP (ref 150–400)
POTASSIUM SERPL-MCNC: 3.6 MMOL/L — SIGNIFICANT CHANGE UP (ref 3.5–5.3)
POTASSIUM SERPL-SCNC: 3.6 MMOL/L — SIGNIFICANT CHANGE UP (ref 3.5–5.3)
RBC # BLD: 4.06 M/UL — SIGNIFICANT CHANGE UP (ref 3.8–5.2)
RBC # FLD: 12.3 % — SIGNIFICANT CHANGE UP (ref 10.3–14.5)
SODIUM SERPL-SCNC: 138 MMOL/L — SIGNIFICANT CHANGE UP (ref 135–145)
WBC # BLD: 11.53 K/UL — HIGH (ref 3.8–10.5)
WBC # FLD AUTO: 11.53 K/UL — HIGH (ref 3.8–10.5)

## 2024-02-14 RX ADMIN — Medication 1 TABLET(S): at 11:07

## 2024-02-14 RX ADMIN — ERTAPENEM SODIUM 120 MILLIGRAM(S): 1 INJECTION, POWDER, LYOPHILIZED, FOR SOLUTION INTRAMUSCULAR; INTRAVENOUS at 15:36

## 2024-02-14 RX ADMIN — Medication 1 MILLIGRAM(S): at 11:07

## 2024-02-14 NOTE — PROGRESS NOTE ADULT - ASSESSMENT
33-year-old female no past medical history  returning to the ED after positive urine cultures for ESBL.  Patient was in the emergency room a few days ago after having some cramping and vaginal bleeding.  Patient at that time had intrauterine pregnancy confirmed at 5 weeks gestation.  Follow-up OB/GYN appointment scheduled for this Tuesday.  Patient at that time was also told she has a urinary tract infection, was prescribed antibiotics.  On culture results today and showed that her strain was resistant to multiple antibiotics and was told to return to the emergency room.  Since being home patient has a lot of suprapubic discomfort, bilateral back pain.  Also with a lot of nausea at home, no fevers, no vomiting.      Plan:  Admit to medicine for possible pylonephritis, pregnant at 6 weeks. IVF, IV Invancz. Urine culture notes ESBL, resistent to Ceftriaxone. Serum HCG at 29,525 consistent with 6     week pregnancy. Transvaginal sono notes pregnancy stated age. Cannot have CT. GYN will follow as consult, agree with Invancz.      Takes no meds at home, no prior medical problems.    ID consult for today, appreciated.       Will finish Invancz Thursday after noon and then discharge home then, currently day 4.  33-year-old female no past medical history  returning to the ED after positive urine cultures for ESBL.  Patient was in the emergency room a few days ago after having some cramping and vaginal bleeding.  Patient at that time had intrauterine pregnancy confirmed at 5 weeks gestation.  Follow-up OB/GYN appointment scheduled for this Tuesday.  Patient at that time was also told she has a urinary tract infection, was prescribed antibiotics.  On culture results today and showed that her strain was resistant to multiple antibiotics and was told to return to the emergency room.  Since being home patient has a lot of suprapubic discomfort, bilateral back pain.  Also with a lot of nausea at home, no fevers, no vomiting.      Plan:  Admit to medicine for possible pylonephritis, pregnant at 6 weeks. IVF, IV Invancz. Urine culture notes ESBL, resistent to Ceftriaxone. Serum HCG at 29,525 consistent with 6     week pregnancy. Transvaginal sono notes pregnancy stated age. Cannot have CT. GYN will follow as consult, agree with Invancz.      Takes no meds at home, no prior medical problems.    ID consult for today, appreciated.       Will finish Invancz Thursday after noon around 4 pm then discharge home then, currently day .

## 2024-02-15 ENCOUNTER — TRANSCRIPTION ENCOUNTER (OUTPATIENT)
Age: 34
End: 2024-02-15

## 2024-02-15 VITALS — HEART RATE: 68 BPM | RESPIRATION RATE: 20 BRPM | OXYGEN SATURATION: 98 % | TEMPERATURE: 99 F

## 2024-02-15 PROCEDURE — 81001 URINALYSIS AUTO W/SCOPE: CPT

## 2024-02-15 PROCEDURE — 99285 EMERGENCY DEPT VISIT HI MDM: CPT

## 2024-02-15 PROCEDURE — 96375 TX/PRO/DX INJ NEW DRUG ADDON: CPT

## 2024-02-15 PROCEDURE — 85025 COMPLETE CBC W/AUTO DIFF WBC: CPT

## 2024-02-15 PROCEDURE — 84702 CHORIONIC GONADOTROPIN TEST: CPT

## 2024-02-15 PROCEDURE — 76801 OB US < 14 WKS SINGLE FETUS: CPT

## 2024-02-15 PROCEDURE — 76770 US EXAM ABDO BACK WALL COMP: CPT

## 2024-02-15 PROCEDURE — 80048 BASIC METABOLIC PNL TOTAL CA: CPT

## 2024-02-15 PROCEDURE — 36415 COLL VENOUS BLD VENIPUNCTURE: CPT

## 2024-02-15 PROCEDURE — 85014 HEMATOCRIT: CPT

## 2024-02-15 PROCEDURE — 93975 VASCULAR STUDY: CPT

## 2024-02-15 PROCEDURE — 85018 HEMOGLOBIN: CPT

## 2024-02-15 PROCEDURE — 80053 COMPREHEN METABOLIC PANEL: CPT

## 2024-02-15 PROCEDURE — 87040 BLOOD CULTURE FOR BACTERIA: CPT

## 2024-02-15 PROCEDURE — 82330 ASSAY OF CALCIUM: CPT

## 2024-02-15 PROCEDURE — 82435 ASSAY OF BLOOD CHLORIDE: CPT

## 2024-02-15 PROCEDURE — 87086 URINE CULTURE/COLONY COUNT: CPT

## 2024-02-15 PROCEDURE — 84295 ASSAY OF SERUM SODIUM: CPT

## 2024-02-15 PROCEDURE — 96374 THER/PROPH/DIAG INJ IV PUSH: CPT

## 2024-02-15 PROCEDURE — 83605 ASSAY OF LACTIC ACID: CPT

## 2024-02-15 PROCEDURE — 76817 TRANSVAGINAL US OBSTETRIC: CPT

## 2024-02-15 PROCEDURE — 84132 ASSAY OF SERUM POTASSIUM: CPT

## 2024-02-15 PROCEDURE — 85027 COMPLETE CBC AUTOMATED: CPT

## 2024-02-15 PROCEDURE — 82947 ASSAY GLUCOSE BLOOD QUANT: CPT

## 2024-02-15 PROCEDURE — 82803 BLOOD GASES ANY COMBINATION: CPT

## 2024-02-15 RX ORDER — SODIUM CHLORIDE 9 MG/ML
500 INJECTION INTRAMUSCULAR; INTRAVENOUS; SUBCUTANEOUS ONCE
Refills: 0 | Status: COMPLETED | OUTPATIENT
Start: 2024-02-15 | End: 2024-02-15

## 2024-02-15 RX ORDER — FOLIC ACID 0.8 MG
1 TABLET ORAL
Qty: 30 | Refills: 0
Start: 2024-02-15 | End: 2024-03-15

## 2024-02-15 RX ADMIN — SODIUM CHLORIDE 1000 MILLILITER(S): 9 INJECTION INTRAMUSCULAR; INTRAVENOUS; SUBCUTANEOUS at 09:01

## 2024-02-15 RX ADMIN — Medication 1 TABLET(S): at 11:19

## 2024-02-15 RX ADMIN — Medication 1 MILLIGRAM(S): at 11:19

## 2024-02-15 RX ADMIN — ERTAPENEM SODIUM 120 MILLIGRAM(S): 1 INJECTION, POWDER, LYOPHILIZED, FOR SOLUTION INTRAMUSCULAR; INTRAVENOUS at 16:05

## 2024-02-15 NOTE — PROGRESS NOTE ADULT - SUBJECTIVE AND OBJECTIVE BOX
INTERVAL HPI/OVERNIGHT EVENTS:  Pt seen and examined at bedside.     Allergies/Intolerance: No Known Allergies      MEDICATIONS  (STANDING):  ertapenem  IVPB 1000 milliGRAM(s) IV Intermittent every 24 hours  folic acid 1 milliGRAM(s) Oral daily  influenza   Vaccine 0.5 milliLiter(s) IntraMuscular once  prenatal multivitamin 1 Tablet(s) Oral daily  sodium chloride 0.9%. 1000 milliLiter(s) (125 mL/Hr) IV Continuous <Continuous>    MEDICATIONS  (PRN):        ROS: all systems reviewed and wnl      PHYSICAL EXAMINATION:  Vital Signs Last 24 Hrs  T(C): 37 (12 Feb 2024 17:14), Max: 37.3 (11 Feb 2024 18:37)  T(F): 98.6 (12 Feb 2024 17:14), Max: 99.2 (11 Feb 2024 18:37)  HR: 64 (12 Feb 2024 17:14) (63 - 78)  BP: 95/61 (12 Feb 2024 17:14) (89/56 - 105/67)  BP(mean): 75 (12 Feb 2024 04:18) (75 - 79)  RR: 18 (12 Feb 2024 17:14) (18 - 19)  SpO2: 99% (12 Feb 2024 17:14) (99% - 100%)    Parameters below as of 12 Feb 2024 17:14  Patient On (Oxygen Delivery Method): room air      CAPILLARY BLOOD GLUCOSE            GENERAL: stable, stable pregnancy.   NECK: supple, No JVD  CHEST/LUNG: clear to auscultation bilaterally; no rales, rhonchi, or wheezing b/l  HEART: normal S1, S2  ABDOMEN: BS+, soft, ND, NT   EXTREMITIES:  pulses palpable; no clubbing, cyanosis, or edema b/l LEs    LABS:                        11.4   10.45 )-----------( 204      ( 12 Feb 2024 06:10 )             34.6     02-11    141  |  106  |  7   ----------------------------<  92  4.4   |  22  |  0.61    Ca    9.0      11 Feb 2024 13:59    TPro  7.3  /  Alb  4.2  /  TBili  0.2  /  DBili  x   /  AST  13  /  ALT  16  /  AlkPhos  55  02-11      Urinalysis Basic - ( 11 Feb 2024 13:59 )    Color: x / Appearance: x / SG: x / pH: x  Gluc: 92 mg/dL / Ketone: x  / Bili: x / Urobili: x   Blood: x / Protein: x / Nitrite: x   Leuk Esterase: x / RBC: x / WBC x   Sq Epi: x / Non Sq Epi: x / Bacteria: x            
INTERVAL HPI/OVERNIGHT EVENTS:  Pt seen and examined at bedside.     Allergies/Intolerance: No Known Allergies      MEDICATIONS  (STANDING):  ertapenem  IVPB 1000 milliGRAM(s) IV Intermittent every 24 hours  folic acid 1 milliGRAM(s) Oral daily  influenza   Vaccine 0.5 milliLiter(s) IntraMuscular once  prenatal multivitamin 1 Tablet(s) Oral daily    MEDICATIONS  (PRN):        ROS: all systems reviewed and wnl      PHYSICAL EXAMINATION:  Vital Signs Last 24 Hrs  T(C): 36.7 (15 Feb 2024 17:15), Max: 37.2 (15 Feb 2024 04:47)  T(F): 98 (15 Feb 2024 17:15), Max: 99 (15 Feb 2024 11:42)  HR: 68 (15 Feb 2024 17:15) (60 - 72)  BP: 95/57 (15 Feb 2024 17:15) (82/48 - 98/64)  BP(mean): --  RR: 18 (15 Feb 2024 17:15) (17 - 20)  SpO2: 96% (15 Feb 2024 17:15) (96% - 100%)    Parameters below as of 15 Feb 2024 17:15  Patient On (Oxygen Delivery Method): room air      CAPILLARY BLOOD GLUCOSE          02-14 @ 07:01  -  02-15 @ 07:00  --------------------------------------------------------  IN: 240 mL / OUT: 0 mL / NET: 240 mL    02-15 @ 07:01  -  02-15 @ 18:38  --------------------------------------------------------  IN: 600 mL / OUT: 0 mL / NET: 600 mL        GENERAL: stable, 6 weeks pregnant  NECK: supple, No JVD  CHEST/LUNG: clear to auscultation bilaterally; no rales, rhonchi, or wheezing b/l  HEART: normal S1, S2  ABDOMEN: BS+, soft, ND, NT   EXTREMITIES:  pulses palpable; no clubbing, cyanosis, or edema b/l LEs  SKIN: no rashes or lesions      LABS:                        11.9   11.53 )-----------( 218      ( 14 Feb 2024 06:51 )             36.0     02-14    138  |  105  |  11  ----------------------------<  89  3.6   |  22  |  0.68    Ca    9.2      14 Feb 2024 06:51        Urinalysis Basic - ( 14 Feb 2024 06:51 )    Color: x / Appearance: x / SG: x / pH: x  Gluc: 89 mg/dL / Ketone: x  / Bili: x / Urobili: x   Blood: x / Protein: x / Nitrite: x   Leuk Esterase: x / RBC: x / WBC x   Sq Epi: x / Non Sq Epi: x / Bacteria: x            
INTERVAL HPI/OVERNIGHT EVENTS:  Pt seen and examined at bedside.     Allergies/Intolerance: No Known Allergies      MEDICATIONS  (STANDING):  ertapenem  IVPB 1000 milliGRAM(s) IV Intermittent every 24 hours  folic acid 1 milliGRAM(s) Oral daily  influenza   Vaccine 0.5 milliLiter(s) IntraMuscular once  prenatal multivitamin 1 Tablet(s) Oral daily    MEDICATIONS  (PRN):        ROS: all systems reviewed and wnl      PHYSICAL EXAMINATION:  Vital Signs Last 24 Hrs  T(C): 36.8 (14 Feb 2024 15:44), Max: 37 (13 Feb 2024 19:35)  T(F): 98.3 (14 Feb 2024 15:44), Max: 98.6 (13 Feb 2024 19:35)  HR: 69 (14 Feb 2024 15:44) (66 - 71)  BP: 100/64 (14 Feb 2024 15:44) (92/57 - 101/62)  BP(mean): --  RR: 20 (14 Feb 2024 15:44) (18 - 20)  SpO2: 99% (14 Feb 2024 15:44) (99% - 100%)    Parameters below as of 14 Feb 2024 15:44  Patient On (Oxygen Delivery Method): room air      CAPILLARY BLOOD GLUCOSE          02-13 @ 07:01 - 02-14 @ 07:00  --------------------------------------------------------  IN: 720 mL / OUT: 0 mL / NET: 720 mL    02-14 @ 07:01  -  02-14 @ 18:31  --------------------------------------------------------  IN: 240 mL / OUT: 0 mL / NET: 240 mL        GENERAL:   NECK: supple, No JVD  CHEST/LUNG: clear to auscultation bilaterally; no rales, rhonchi, or wheezing b/l  HEART: normal S1, S2  ABDOMEN: BS+, soft, ND, NT   EXTREMITIES:  pulses palpable; no clubbing, cyanosis, or edema b/l LEs    LABS:                        11.9   11.53 )-----------( 218      ( 14 Feb 2024 06:51 )             36.0     02-14    138  |  105  |  11  ----------------------------<  89  3.6   |  22  |  0.68    Ca    9.2      14 Feb 2024 06:51        Urinalysis Basic - ( 14 Feb 2024 06:51 )    Color: x / Appearance: x / SG: x / pH: x  Gluc: 89 mg/dL / Ketone: x  / Bili: x / Urobili: x   Blood: x / Protein: x / Nitrite: x   Leuk Esterase: x / RBC: x / WBC x   Sq Epi: x / Non Sq Epi: x / Bacteria: x            
INTERVAL HPI/OVERNIGHT EVENTS:  Pt seen and examined at bedside.     Allergies/Intolerance: No Known Allergies      MEDICATIONS  (STANDING):  ertapenem  IVPB 1000 milliGRAM(s) IV Intermittent every 24 hours  folic acid 1 milliGRAM(s) Oral daily  influenza   Vaccine 0.5 milliLiter(s) IntraMuscular once  prenatal multivitamin 1 Tablet(s) Oral daily  sodium chloride 0.9%. 1000 milliLiter(s) (60 mL/Hr) IV Continuous <Continuous>    MEDICATIONS  (PRN):        ROS: all systems reviewed and wnl      PHYSICAL EXAMINATION:  Vital Signs Last 24 Hrs  T(C): 36.8 (13 Feb 2024 11:38), Max: 37.2 (13 Feb 2024 08:12)  T(F): 98.3 (13 Feb 2024 11:38), Max: 98.9 (13 Feb 2024 08:12)  HR: 71 (13 Feb 2024 11:38) (63 - 77)  BP: 100/63 (13 Feb 2024 11:38) (89/56 - 100/63)  BP(mean): --  RR: 16 (13 Feb 2024 11:38) (16 - 18)  SpO2: 99% (13 Feb 2024 11:38) (99% - 100%)    Parameters below as of 13 Feb 2024 11:38  Patient On (Oxygen Delivery Method): room air      CAPILLARY BLOOD GLUCOSE          02-12 @ 07:01 - 02-13 @ 07:00  --------------------------------------------------------  IN: 850 mL / OUT: 0 mL / NET: 850 mL    02-13 @ 07:01  -  02-13 @ 15:36  --------------------------------------------------------  IN: 480 mL / OUT: 0 mL / NET: 480 mL        GENERAL: stable, 6 week pregnancy in place.   NECK: supple, No JVD  CHEST/LUNG: clear to auscultation bilaterally; no rales, rhonchi, or wheezing b/l  HEART: normal S1, S2  ABDOMEN: BS+, soft, ND, NT   EXTREMITIES:  pulses palpable; no clubbing, cyanosis, or edema b/l LEs    LABS:                        11.4   10.45 )-----------( 204      ( 12 Feb 2024 06:10 )             34.6

## 2024-02-15 NOTE — PROGRESS NOTE ADULT - ASSESSMENT
33-year-old female no past medical history  returning to the ED after positive urine cultures for ESBL.  Patient was in the emergency room a few days ago after having some cramping and vaginal bleeding.  Patient at that time had intrauterine pregnancy confirmed at 5 weeks gestation.  Follow-up OB/GYN appointment scheduled for this Tuesday.  Patient at that time was also told she has a urinary tract infection, was prescribed antibiotics.  On culture results today and showed that her strain was resistant to multiple antibiotics and was told to return to the emergency room.  Since being home patient has a lot of suprapubic discomfort, bilateral back pain.  Also with a lot of nausea at home, no fevers, no vomiting.      Plan:  Admit to medicine for possible pylonephritis, pregnant at 6 weeks. IVF, IV Invancz. Urine culture notes ESBL, resistent to Ceftriaxone. Serum HCG at 29,525 consistent with 6     week pregnancy. Transvaginal sono notes pregnancy stated age. Cannot have CT. GYN will follow as consult, agree with Invancz.      Takes no meds at home, no prior medical problems.    ID consult for today, appreciated.       Will finish Invancz today, discharge home then, currently day .

## 2024-02-15 NOTE — DISCHARGE NOTE PROVIDER - NSDCCPCAREPLAN_GEN_ALL_CORE_FT
PRINCIPAL DISCHARGE DIAGNOSIS  Diagnosis: Pyelonephritis  Assessment and Plan of Treatment:      PRINCIPAL DISCHARGE DIAGNOSIS  Diagnosis: Pyelonephritis  Assessment and Plan of Treatment: with urine culture from 2/7 showed ESBL Ecoli. Treated with 5 days course of Invanz.  Drink enough water and fluids to keep your urine clear or pale yellow.  Avoid caffeine, tea, and carbonated beverages. They tend to irritate your bladder.  Empty your bladder often. Avoid holding urine for long periods of time..  After a bowel movement, clean from front to back. Use each tissue only once.  SEEK MEDICAL CARE IF:  You have back pain.  You develop a fever.  Your symptoms do not begin to resolve within 3 days.  SEEK IMMEDIATE MEDICAL CARE IF:  You have severe back pain or lower abdominal pain.  You develop chills.  You have nausea or vomiting.  You have continued burning or discomfort with urination.       PRINCIPAL DISCHARGE DIAGNOSIS  Diagnosis: Pyelonephritis  Assessment and Plan of Treatment: with urine culture from 2/7 showed ESBL Ecoli. Treated with 5 days course of Invanz.  Drink enough water and fluids to keep your urine clear or pale yellow.  Avoid caffeine, tea, and carbonated beverages. They tend to irritate your bladder.  Empty your bladder often. Avoid holding urine for long periods of time..  After a bowel movement, clean from front to back. Use each tissue only once.  SEEK MEDICAL CARE IF:  You have back pain.  You develop a fever.  Your symptoms do not begin to resolve within 3 days.  SEEK IMMEDIATE MEDICAL CARE IF:  You have severe back pain or lower abdominal pain.  You develop chills.  You have nausea or vomiting.  You have continued burning or discomfort with urination.        SECONDARY DISCHARGE DIAGNOSES  Diagnosis: Pregnancy  Assessment and Plan of Treatment: Follow up with your OB/Gyn doctor.

## 2024-02-15 NOTE — PROGRESS NOTE ADULT - REASON FOR ADMISSION
Pregnancy at 6 weeks with pylonephritis.

## 2024-02-15 NOTE — DISCHARGE NOTE PROVIDER - HOSPITAL COURSE
HPI:  33-year-old female no past medical history  returning to the ED after positive urine cultures for ESBL.  Patient was in the emergency room a few days ago after having some cramping and vaginal bleeding.  Patient at that time had intrauterine pregnancy confirmed at 5 weeks gestation.  Follow-up OB/GYN appointment scheduled for this Tuesday.  Patient at that time was also told she has a urinary tract infection, was prescribed antibiotics.  On culture results today and showed that her strain was resistant to multiple antibiotics and was told to return to the emergency room.  Since being home patient has a lot of suprapubic discomfort, bilateral back pain.  Also with a lot of nausea at home, no fevers, no vomiting. (2024 17:21)    Hospital Course:      Important Medication Changes and Reason:    Active or Pending Issues Requiring Follow-up:    Advanced Directives:   [ ] Full code  [ ] DNR  [ ] Hospice    Discharge Diagnoses:         HPI:  33-year-old female no past medical history  returning to the ED after positive urine cultures for ESBL.  Patient was in the emergency room a few days ago after having some cramping and vaginal bleeding.  Patient at that time had intrauterine pregnancy confirmed at 5 weeks gestation.  Follow-up OB/GYN appointment scheduled for this Tuesday.  Patient at that time was also told she has a urinary tract infection, was prescribed antibiotics.  On culture results today and showed that her strain was resistant to multiple antibiotics and was told to return to the emergency room.  Since being home patient has a lot of suprapubic discomfort, bilateral back pain.  Also with a lot of nausea at home, no fevers, no vomiting. (2024 17:21)    Hospital Course:    33 f came to ER  one day after she tested positive for pregnancy with lower abd cramp and vaginal bleeding, also said she had long standing dysuria, u/s showed intrauterine pregnancy, u/a was positive so pt was discharged with keflex but the urine cx came back with ESBL E-coli and pt was called back . Patient was afebrile, no WBC and no urinary symptoms .Repeat u/a negative, urine cx negative. ID was consulted. Culture from  showed ESBL E coli. Sensitive to Bactrim, but patient pregnant. Completed a 5 day course of invanz.         Important Medication Changes and Reason:    Active or Pending Issues Requiring Follow-up:    Advanced Directives:   [ ] Full code  [ ] DNR  [ ] Hospice    Discharge Diagnoses:  Pyelo nephritis         HPI:  33-year-old female no past medical history  returning to the ED after positive urine cultures for ESBL.  Patient was in the emergency room a few days ago after having some cramping and vaginal bleeding.  Patient at that time had intrauterine pregnancy confirmed at 5 weeks gestation.  Follow-up OB/GYN appointment scheduled for this Tuesday.  Patient at that time was also told she has a urinary tract infection, was prescribed antibiotics.  On culture results today and showed that her strain was resistant to multiple antibiotics and was told to return to the emergency room.  Since being home patient has a lot of suprapubic discomfort, bilateral back pain.  Also with a lot of nausea at home, no fevers, no vomiting. (2024 17:21)    Hospital Course:    33 f came to ER  one day after she tested positive for pregnancy with lower abd cramp and vaginal bleeding, also said she had long standing dysuria, u/s showed intrauterine pregnancy, u/a was positive so pt was discharged with keflex but the urine cx came back with ESBL E-coli and pt was called back . Patient was afebrile, no WBC and no urinary symptoms .Repeat u/a negative, urine cx negative. ID was consulted. Culture from  showed ESBL E coli. Sensitive to Bactrim, but patient pregnant. Completed a 5 day course of invanz. Medically cleared for discharge with out patient follow up with her oB/Gyn.        Important Medication Changes and Reason:    Active or Pending Issues Requiring Follow-up:  PCP, GYN  Advanced Directives:   Full code  Discharge Diagnoses:  Pyelonephritis  Pregnancy   HPI:  33-year-old female no past medical history  returning to the ED after positive urine cultures for ESBL.  Patient was in the emergency room a few days ago after having some cramping and vaginal bleeding.  Patient at that time had intrauterine pregnancy confirmed at 5 weeks gestation.  Follow-up OB/GYN appointment scheduled for this Tuesday.  Patient at that time was also told she has a urinary tract infection, was prescribed antibiotics.  On culture results today and showed that her strain was resistant to multiple antibiotics and was told to return to the emergency room.  Since being home patient has a lot of suprapubic discomfort, bilateral back pain.  Also with a lot of nausea at home, no fevers, no vomiting. (2024 17:21)    Hospital Course:    33 f came to ER  one day after she tested positive for pregnancy with lower abd cramp and vaginal bleeding, also said she had long standing dysuria, u/s showed intrauterine pregnancy, u/a was positive so pt was discharged with keflex but the urine cx came back with ESBL E-coli and pt was called back . Patient was afebrile, no WBC and no urinary symptoms .Repeat u/a negative, urine cx negative. ID was consulted. Culture from  showed ESBL E coli. Sensitive to Bactrim, but patient pregnant.     Completed a 5 day course of invanz. GYN cleared for use of Invancz during pregnancy.     Medically cleared for discharge with out patient follow up with her oB/Gyn.    Important Medication Changes and Reason:    Active or Pending Issues Requiring Follow-up:  PCP, GYN  Advanced Directives:   Full code  Discharge Diagnoses:  Pyelonephritis  Pregnancy

## 2024-02-15 NOTE — DISCHARGE NOTE PROVIDER - NSDCMRMEDTOKEN_GEN_ALL_CORE_FT
cephalexin 250 mg oral tablet: 1 tab(s) orally every 6 hours   folic acid 1 mg oral tablet: 1 tab(s) orally once a day  Prenatal Multivitamins with Folic Acid 1 mg oral tablet: 1 tab(s) orally once a day

## 2024-02-15 NOTE — DISCHARGE NOTE PROVIDER - CARE PROVIDER_API CALL
Justine Peralta  Family Medicine  [address] [place] [address], [facility] [address]  [place] [address]  Phone: [phone]  Fax: [phone]  Follow Up Time:

## 2024-02-15 NOTE — DISCHARGE NOTE NURSING/CASE MANAGEMENT/SOCIAL WORK - PATIENT PORTAL LINK FT
You can access the FollowMyHealth Patient Portal offered by [facility] by registering at the following website: [email or web address]. By joining [employer]’s FollowMyHealth portal, you will also be able to view your health information using other applications (apps) compatible with our system.

## 2024-02-16 LAB
CULTURE RESULTS: SIGNIFICANT CHANGE UP
CULTURE RESULTS: SIGNIFICANT CHANGE UP
SPECIMEN SOURCE: SIGNIFICANT CHANGE UP
SPECIMEN SOURCE: SIGNIFICANT CHANGE UP

## 2024-02-22 ENCOUNTER — APPOINTMENT (OUTPATIENT)
Dept: OBGYN | Facility: CLINIC | Age: 34
End: 2024-02-22

## 2024-03-04 ENCOUNTER — NON-APPOINTMENT (OUTPATIENT)
Age: 34
End: 2024-03-04

## 2024-03-05 ENCOUNTER — NON-APPOINTMENT (OUTPATIENT)
Age: 34
End: 2024-03-05

## 2024-03-05 DIAGNOSIS — Z34.91 ENCOUNTER FOR SUPERVISION OF NORMAL PREGNANCY, UNSPECIFIED, FIRST TRIMESTER: ICD-10-CM

## 2024-03-05 PROBLEM — Z00.00 ENCOUNTER FOR PREVENTIVE HEALTH EXAMINATION: Status: ACTIVE | Noted: 2024-03-05

## 2024-03-06 ENCOUNTER — APPOINTMENT (OUTPATIENT)
Dept: OBGYN | Facility: CLINIC | Age: 34
End: 2024-03-06

## 2024-04-02 ENCOUNTER — INPATIENT (INPATIENT)
Facility: HOSPITAL | Age: 34
LOS: 1 days | Discharge: HOME | DRG: 831 | End: 2024-04-04
Attending: STUDENT IN AN ORGANIZED HEALTH CARE EDUCATION/TRAINING PROGRAM | Admitting: HOSPITALIST
Payer: MEDICAID

## 2024-04-02 VITALS
HEART RATE: 76 BPM | OXYGEN SATURATION: 99 % | HEIGHT: 60 IN | DIASTOLIC BLOOD PRESSURE: 72 MMHG | RESPIRATION RATE: 18 BRPM | WEIGHT: 143.3 LBS | TEMPERATURE: 98 F | SYSTOLIC BLOOD PRESSURE: 107 MMHG

## 2024-04-02 DIAGNOSIS — N12 TUBULO-INTERSTITIAL NEPHRITIS, NOT SPECIFIED AS ACUTE OR CHRONIC: ICD-10-CM

## 2024-04-02 DIAGNOSIS — Z29.9 ENCOUNTER FOR PROPHYLACTIC MEASURES, UNSPECIFIED: ICD-10-CM

## 2024-04-02 DIAGNOSIS — N39.0 URINARY TRACT INFECTION, SITE NOT SPECIFIED: ICD-10-CM

## 2024-04-02 DIAGNOSIS — K92.0 HEMATEMESIS: ICD-10-CM

## 2024-04-02 LAB
ALBUMIN SERPL ELPH-MCNC: 3.7 G/DL — SIGNIFICANT CHANGE UP (ref 3.3–5)
ALP SERPL-CCNC: 45 U/L — SIGNIFICANT CHANGE UP (ref 40–120)
ALT FLD-CCNC: 27 U/L — SIGNIFICANT CHANGE UP (ref 10–45)
ANION GAP SERPL CALC-SCNC: 10 MMOL/L — SIGNIFICANT CHANGE UP (ref 5–17)
ANION GAP SERPL CALC-SCNC: 11 MMOL/L — SIGNIFICANT CHANGE UP (ref 5–17)
APPEARANCE UR: ABNORMAL
AST SERPL-CCNC: 17 U/L — SIGNIFICANT CHANGE UP (ref 10–40)
BACTERIA # UR AUTO: ABNORMAL /HPF
BASOPHILS # BLD AUTO: 0.03 K/UL — SIGNIFICANT CHANGE UP (ref 0–0.2)
BASOPHILS NFR BLD AUTO: 0.3 % — SIGNIFICANT CHANGE UP (ref 0–2)
BILIRUB SERPL-MCNC: 0.2 MG/DL — SIGNIFICANT CHANGE UP (ref 0.2–1.2)
BILIRUB UR-MCNC: NEGATIVE — SIGNIFICANT CHANGE UP
BUN SERPL-MCNC: 6 MG/DL — LOW (ref 7–23)
BUN SERPL-MCNC: 6 MG/DL — LOW (ref 7–23)
CALCIUM SERPL-MCNC: 8.4 MG/DL — SIGNIFICANT CHANGE UP (ref 8.4–10.5)
CALCIUM SERPL-MCNC: 9.1 MG/DL — SIGNIFICANT CHANGE UP (ref 8.4–10.5)
CANDIDA AB TITR SER: SIGNIFICANT CHANGE UP
CAST: 2 /LPF — SIGNIFICANT CHANGE UP (ref 0–4)
CHLORIDE SERPL-SCNC: 104 MMOL/L — SIGNIFICANT CHANGE UP (ref 96–108)
CHLORIDE SERPL-SCNC: 106 MMOL/L — SIGNIFICANT CHANGE UP (ref 96–108)
CO2 SERPL-SCNC: 20 MMOL/L — LOW (ref 22–31)
CO2 SERPL-SCNC: 21 MMOL/L — LOW (ref 22–31)
COLOR SPEC: YELLOW — SIGNIFICANT CHANGE UP
CREAT SERPL-MCNC: 0.49 MG/DL — LOW (ref 0.5–1.3)
CREAT SERPL-MCNC: 0.57 MG/DL — SIGNIFICANT CHANGE UP (ref 0.5–1.3)
DIFF PNL FLD: ABNORMAL
EGFR: 124 ML/MIN/1.73M2 — SIGNIFICANT CHANGE UP
EGFR: 128 ML/MIN/1.73M2 — SIGNIFICANT CHANGE UP
EOSINOPHIL # BLD AUTO: 0.26 K/UL — SIGNIFICANT CHANGE UP (ref 0–0.5)
EOSINOPHIL NFR BLD AUTO: 2.9 % — SIGNIFICANT CHANGE UP (ref 0–6)
G VAGINALIS DNA SPEC QL NAA+PROBE: SIGNIFICANT CHANGE UP
GLUCOSE SERPL-MCNC: 75 MG/DL — SIGNIFICANT CHANGE UP (ref 70–99)
GLUCOSE SERPL-MCNC: 84 MG/DL — SIGNIFICANT CHANGE UP (ref 70–99)
GLUCOSE UR QL: NEGATIVE MG/DL — SIGNIFICANT CHANGE UP
HCT VFR BLD CALC: 32.9 % — LOW (ref 34.5–45)
HCT VFR BLD CALC: 34.2 % — LOW (ref 34.5–45)
HGB BLD-MCNC: 11.1 G/DL — LOW (ref 11.5–15.5)
HGB BLD-MCNC: 11.7 G/DL — SIGNIFICANT CHANGE UP (ref 11.5–15.5)
IMM GRANULOCYTES NFR BLD AUTO: 0.3 % — SIGNIFICANT CHANGE UP (ref 0–0.9)
KETONES UR-MCNC: 40 MG/DL
LEUKOCYTE ESTERASE UR-ACNC: ABNORMAL
LIDOCAIN IGE QN: 25 U/L — SIGNIFICANT CHANGE UP (ref 7–60)
LYMPHOCYTES # BLD AUTO: 2.38 K/UL — SIGNIFICANT CHANGE UP (ref 1–3.3)
LYMPHOCYTES # BLD AUTO: 26.9 % — SIGNIFICANT CHANGE UP (ref 13–44)
MAGNESIUM SERPL-MCNC: 1.9 MG/DL — SIGNIFICANT CHANGE UP (ref 1.6–2.6)
MCHC RBC-ENTMCNC: 29.4 PG — SIGNIFICANT CHANGE UP (ref 27–34)
MCHC RBC-ENTMCNC: 29.5 PG — SIGNIFICANT CHANGE UP (ref 27–34)
MCHC RBC-ENTMCNC: 33.7 GM/DL — SIGNIFICANT CHANGE UP (ref 32–36)
MCHC RBC-ENTMCNC: 34.2 GM/DL — SIGNIFICANT CHANGE UP (ref 32–36)
MCV RBC AUTO: 86.4 FL — SIGNIFICANT CHANGE UP (ref 80–100)
MCV RBC AUTO: 87.3 FL — SIGNIFICANT CHANGE UP (ref 80–100)
MONOCYTES # BLD AUTO: 0.6 K/UL — SIGNIFICANT CHANGE UP (ref 0–0.9)
MONOCYTES NFR BLD AUTO: 6.8 % — SIGNIFICANT CHANGE UP (ref 2–14)
N GONORRHOEA RRNA SPEC QL NAA+PROBE: SIGNIFICANT CHANGE UP
NEUTROPHILS # BLD AUTO: 5.56 K/UL — SIGNIFICANT CHANGE UP (ref 1.8–7.4)
NEUTROPHILS NFR BLD AUTO: 62.8 % — SIGNIFICANT CHANGE UP (ref 43–77)
NITRITE UR-MCNC: NEGATIVE — SIGNIFICANT CHANGE UP
NRBC # BLD: 0 /100 WBCS — SIGNIFICANT CHANGE UP (ref 0–0)
NRBC # BLD: 0 /100 WBCS — SIGNIFICANT CHANGE UP (ref 0–0)
PH UR: 6.5 — SIGNIFICANT CHANGE UP (ref 5–8)
PHOSPHATE SERPL-MCNC: 3.6 MG/DL — SIGNIFICANT CHANGE UP (ref 2.5–4.5)
PLATELET # BLD AUTO: 206 K/UL — SIGNIFICANT CHANGE UP (ref 150–400)
PLATELET # BLD AUTO: 225 K/UL — SIGNIFICANT CHANGE UP (ref 150–400)
POTASSIUM SERPL-MCNC: 3.7 MMOL/L — SIGNIFICANT CHANGE UP (ref 3.5–5.3)
POTASSIUM SERPL-MCNC: 3.7 MMOL/L — SIGNIFICANT CHANGE UP (ref 3.5–5.3)
POTASSIUM SERPL-SCNC: 3.7 MMOL/L — SIGNIFICANT CHANGE UP (ref 3.5–5.3)
POTASSIUM SERPL-SCNC: 3.7 MMOL/L — SIGNIFICANT CHANGE UP (ref 3.5–5.3)
PROT SERPL-MCNC: 6.5 G/DL — SIGNIFICANT CHANGE UP (ref 6–8.3)
PROT UR-MCNC: NEGATIVE MG/DL — SIGNIFICANT CHANGE UP
RBC # BLD: 3.77 M/UL — LOW (ref 3.8–5.2)
RBC # BLD: 3.96 M/UL — SIGNIFICANT CHANGE UP (ref 3.8–5.2)
RBC # FLD: 12.1 % — SIGNIFICANT CHANGE UP (ref 10.3–14.5)
RBC # FLD: 12.3 % — SIGNIFICANT CHANGE UP (ref 10.3–14.5)
RBC CASTS # UR COMP ASSIST: 9 /HPF — HIGH (ref 0–4)
SODIUM SERPL-SCNC: 136 MMOL/L — SIGNIFICANT CHANGE UP (ref 135–145)
SODIUM SERPL-SCNC: 136 MMOL/L — SIGNIFICANT CHANGE UP (ref 135–145)
SP GR SPEC: 1.01 — SIGNIFICANT CHANGE UP (ref 1–1.03)
SPECIMEN SOURCE: SIGNIFICANT CHANGE UP
SQUAMOUS # UR AUTO: 7 /HPF — HIGH (ref 0–5)
T VAGINALIS SPEC QL WET PREP: SIGNIFICANT CHANGE UP
TROPONIN T, HIGH SENSITIVITY RESULT: <6 NG/L — SIGNIFICANT CHANGE UP (ref 0–51)
UROBILINOGEN FLD QL: 0.2 MG/DL — SIGNIFICANT CHANGE UP (ref 0.2–1)
WBC # BLD: 7.77 K/UL — SIGNIFICANT CHANGE UP (ref 3.8–10.5)
WBC # BLD: 8.86 K/UL — SIGNIFICANT CHANGE UP (ref 3.8–10.5)
WBC # FLD AUTO: 7.77 K/UL — SIGNIFICANT CHANGE UP (ref 3.8–10.5)
WBC # FLD AUTO: 8.86 K/UL — SIGNIFICANT CHANGE UP (ref 3.8–10.5)
WBC UR QL: 15 /HPF — HIGH (ref 0–5)

## 2024-04-02 PROCEDURE — 99285 EMERGENCY DEPT VISIT HI MDM: CPT

## 2024-04-02 PROCEDURE — 99223 1ST HOSP IP/OBS HIGH 75: CPT | Mod: GC

## 2024-04-02 PROCEDURE — 99223 1ST HOSP IP/OBS HIGH 75: CPT

## 2024-04-02 PROCEDURE — 71045 X-RAY EXAM CHEST 1 VIEW: CPT | Mod: 26

## 2024-04-02 PROCEDURE — 99053 MED SERV 10PM-8AM 24 HR FAC: CPT

## 2024-04-02 PROCEDURE — 76770 US EXAM ABDO BACK WALL COMP: CPT | Mod: 26

## 2024-04-02 PROCEDURE — 76810 OB US >/= 14 WKS ADDL FETUS: CPT | Mod: 26

## 2024-04-02 PROCEDURE — 99254 IP/OBS CNSLTJ NEW/EST MOD 60: CPT

## 2024-04-02 PROCEDURE — 93975 VASCULAR STUDY: CPT | Mod: 26

## 2024-04-02 RX ORDER — PANTOPRAZOLE SODIUM 20 MG/1
40 TABLET, DELAYED RELEASE ORAL
Refills: 0 | Status: DISCONTINUED | OUTPATIENT
Start: 2024-04-02 | End: 2024-04-04

## 2024-04-02 RX ORDER — METOCLOPRAMIDE HCL 10 MG
5 TABLET ORAL EVERY 8 HOURS
Refills: 0 | Status: DISCONTINUED | OUTPATIENT
Start: 2024-04-02 | End: 2024-04-04

## 2024-04-02 RX ORDER — SODIUM CHLORIDE 9 MG/ML
1000 INJECTION INTRAMUSCULAR; INTRAVENOUS; SUBCUTANEOUS ONCE
Refills: 0 | Status: COMPLETED | OUTPATIENT
Start: 2024-04-02 | End: 2024-04-02

## 2024-04-02 RX ORDER — ERTAPENEM SODIUM 1 G/1
1000 INJECTION, POWDER, LYOPHILIZED, FOR SOLUTION INTRAMUSCULAR; INTRAVENOUS ONCE
Refills: 0 | Status: COMPLETED | OUTPATIENT
Start: 2024-04-02 | End: 2024-04-02

## 2024-04-02 RX ORDER — ONDANSETRON 8 MG/1
4 TABLET, FILM COATED ORAL EVERY 6 HOURS
Refills: 0 | Status: DISCONTINUED | OUTPATIENT
Start: 2024-04-02 | End: 2024-04-04

## 2024-04-02 RX ORDER — METOCLOPRAMIDE HCL 10 MG
10 TABLET ORAL ONCE
Refills: 0 | Status: COMPLETED | OUTPATIENT
Start: 2024-04-02 | End: 2024-04-02

## 2024-04-02 RX ORDER — PYRIDOXINE HCL (VITAMIN B6) 100 MG
25 TABLET ORAL ONCE
Refills: 0 | Status: COMPLETED | OUTPATIENT
Start: 2024-04-02 | End: 2024-04-02

## 2024-04-02 RX ORDER — PYRIDOXINE HCL (VITAMIN B6) 100 MG
100 TABLET ORAL ONCE
Refills: 0 | Status: DISCONTINUED | OUTPATIENT
Start: 2024-04-02 | End: 2024-04-02

## 2024-04-02 RX ORDER — ONDANSETRON 8 MG/1
4 TABLET, FILM COATED ORAL ONCE
Refills: 0 | Status: COMPLETED | OUTPATIENT
Start: 2024-04-02 | End: 2024-04-02

## 2024-04-02 RX ORDER — ERTAPENEM SODIUM 1 G/1
1000 INJECTION, POWDER, LYOPHILIZED, FOR SOLUTION INTRAMUSCULAR; INTRAVENOUS EVERY 24 HOURS
Refills: 0 | Status: DISCONTINUED | OUTPATIENT
Start: 2024-04-03 | End: 2024-04-03

## 2024-04-02 RX ORDER — SODIUM CHLORIDE 9 MG/ML
1000 INJECTION INTRAMUSCULAR; INTRAVENOUS; SUBCUTANEOUS
Refills: 0 | Status: DISCONTINUED | OUTPATIENT
Start: 2024-04-02 | End: 2024-04-03

## 2024-04-02 RX ORDER — ACETAMINOPHEN 500 MG
650 TABLET ORAL EVERY 6 HOURS
Refills: 0 | Status: DISCONTINUED | OUTPATIENT
Start: 2024-04-02 | End: 2024-04-04

## 2024-04-02 RX ORDER — DIPHENHYDRAMINE HCL 50 MG
25 CAPSULE ORAL EVERY 6 HOURS
Refills: 0 | Status: DISCONTINUED | OUTPATIENT
Start: 2024-04-02 | End: 2024-04-04

## 2024-04-02 RX ORDER — PYRIDOXINE HCL (VITAMIN B6) 100 MG
25 TABLET ORAL EVERY 24 HOURS
Refills: 0 | Status: DISCONTINUED | OUTPATIENT
Start: 2024-04-02 | End: 2024-04-04

## 2024-04-02 RX ORDER — PANTOPRAZOLE SODIUM 20 MG/1
8 TABLET, DELAYED RELEASE ORAL
Qty: 80 | Refills: 0 | Status: DISCONTINUED | OUTPATIENT
Start: 2024-04-02 | End: 2024-04-02

## 2024-04-02 RX ORDER — PYRIDOXINE HCL (VITAMIN B6) 100 MG
20 TABLET ORAL EVERY 24 HOURS
Refills: 0 | Status: DISCONTINUED | OUTPATIENT
Start: 2024-04-02 | End: 2024-04-02

## 2024-04-02 RX ORDER — INFLUENZA VIRUS VACCINE 15; 15; 15; 15 UG/.5ML; UG/.5ML; UG/.5ML; UG/.5ML
0.5 SUSPENSION INTRAMUSCULAR ONCE
Refills: 0 | Status: DISCONTINUED | OUTPATIENT
Start: 2024-04-02 | End: 2024-04-04

## 2024-04-02 RX ADMIN — Medication 25 MILLIGRAM(S): at 02:28

## 2024-04-02 RX ADMIN — PANTOPRAZOLE SODIUM 40 MILLIGRAM(S): 20 TABLET, DELAYED RELEASE ORAL at 10:51

## 2024-04-02 RX ADMIN — SODIUM CHLORIDE 1000 MILLILITER(S): 9 INJECTION INTRAMUSCULAR; INTRAVENOUS; SUBCUTANEOUS at 02:01

## 2024-04-02 RX ADMIN — Medication 650 MILLIGRAM(S): at 22:17

## 2024-04-02 RX ADMIN — ONDANSETRON 4 MILLIGRAM(S): 8 TABLET, FILM COATED ORAL at 22:17

## 2024-04-02 RX ADMIN — Medication 650 MILLIGRAM(S): at 23:15

## 2024-04-02 RX ADMIN — Medication 25 MILLIGRAM(S): at 22:18

## 2024-04-02 RX ADMIN — ERTAPENEM SODIUM 120 MILLIGRAM(S): 1 INJECTION, POWDER, LYOPHILIZED, FOR SOLUTION INTRAMUSCULAR; INTRAVENOUS at 04:30

## 2024-04-02 RX ADMIN — ONDANSETRON 4 MILLIGRAM(S): 8 TABLET, FILM COATED ORAL at 04:57

## 2024-04-02 RX ADMIN — PANTOPRAZOLE SODIUM 10 MG/HR: 20 TABLET, DELAYED RELEASE ORAL at 09:29

## 2024-04-02 RX ADMIN — PANTOPRAZOLE SODIUM 40 MILLIGRAM(S): 20 TABLET, DELAYED RELEASE ORAL at 19:00

## 2024-04-02 RX ADMIN — Medication 10 MILLIGRAM(S): at 02:28

## 2024-04-02 RX ADMIN — SODIUM CHLORIDE 75 MILLILITER(S): 9 INJECTION INTRAMUSCULAR; INTRAVENOUS; SUBCUTANEOUS at 10:51

## 2024-04-02 NOTE — ED PROVIDER NOTE - ATTENDING CONTRIBUTION TO CARE
33F G2, P1 at 14 weeks pregnant who presents with abdominal pain, and hematemesis less than 1 oz of blood in vomit per patient, pt reports that she has been vomiting throughout this pregnancy is on doxyaylamine w/ minimal relief, no vaginal bleeding, mild cp, s/p vomiting, no sob, +dysuria, and urinary freq/urgency  On exam, pt is awake and alert oriented x3, has clear lungs no chest wall crepitus, pt w/ soft abdomen, no lower leg edema, pt to have labs and reassessment has a known hx of EBSL E. coli w/ positive UA, pt w/ pregnancy concern for pyelonephritis will receive Invanz which she received previously.

## 2024-04-02 NOTE — H&P ADULT - NSHPLABSRESULTS_GEN_ALL_CORE
11.1   7.77  )-----------( 206      ( 02 Apr 2024 12:31 )             32.9     Hgb Trend: 11.1<--, 11.7<--  04-02    136  |  106  |  6<L>  ----------------------------<  75  3.7   |  20<L>  |  0.49<L>    Ca    8.4      02 Apr 2024 12:31  Phos  3.6     04-02  Mg     1.9     04-02    TPro  6.5  /  Alb  3.7  /  TBili  0.2  /  DBili  x   /  AST  17  /  ALT  27  /  AlkPhos  45  04-02    Creatinine Trend: 0.49<--, 0.57<--        Urinalysis Basic - ( 02 Apr 2024 12:31 )    Color: x / Appearance: x / SG: x / pH: x  Gluc: 75 mg/dL / Ketone: x  / Bili: x / Urobili: x   Blood: x / Protein: x / Nitrite: x   Leuk Esterase: x / RBC: x / WBC x   Sq Epi: x / Non Sq Epi: x / Bacteria: x

## 2024-04-02 NOTE — ED ADULT NURSE NOTE - OBJECTIVE STATEMENT
33YF Thai Speaking A&Ox4 Ambulatory  14 weeks pregnant presents to the ED c/o abdominal pain w/ coffee-ground and bloody emesis since today. pt reports having CP, SOB, epigastric and suprapubic pain. pt was recently dx with E. coli and has been following up with OBGYN but is still having persistent dysuria. pt denies vaginal bleeding, HA, dizziness, diarrhea, bloody stools

## 2024-04-02 NOTE — ED PROVIDER NOTE - PROGRESS NOTE DETAILS
Explained to patient that the risk of radiation with x-ray is minimal and they are able to protect the baby  for imaging. Patient endorses that her nausea has improved, but not completely resolved.  Plan to give Zofran and reassess.  Awaiting UA and chest x-ray.  Edvin Rodriguez MD PGY-1 paged ob gyn.   Edvin Rodriguez MD PGY-1 repaged ob gyn.   Edvin Rodriguez MD PGY-1 Spoke to OB/GYN, agreed to see patient.  pager was not working, did not receive previous pages.   Edvin Rodriguez MD PGY-1

## 2024-04-02 NOTE — CONSULT NOTE ADULT - SUBJECTIVE AND OBJECTIVE BOX
EDUARDO BRENNER  33y  Female [id number]    HPI:  32yo  with WILLIAM 10/3/24 13w5d presents to ED with nausea and vomiting and concerns of dysuria. Patient states she had coffee colored emesis around 1:30 with subsequent bright red emesis around the same time. She has had issues with nausea in this pregnancy and has been prescribed Diclegis and Doxylamine for her nausea. She endorses that this helps with her nausea but that she does not like how this makes her feel in terms of feeling more sleepy. She had something to eat yesterday which she kept down around 11:30 and has recently been able to tolerate water with the zofran and reglan given in the ED.   Patient is admitted to medicine for pyelonephritis.  Of note she was admitted from -2/15 for tx of pyelonephritis caused by ESBL bacteria and was started on Invanz for 5 days. Patient states she was not discharged with outpatient antibiotics. She saw her OB recently with complaints after her discharge from [facility] about urinary symptoms of dysuria and foul smelling urine but was not tx. She was told that she may need prophylactic antibiotics at some point if she continues to have urinary infections in this pregnancy. She follows with [facility] for her care and states that she does not like the care she gets there which is why she comes to [facility] for emergent visits.   Patient endorsing epigastric pain and slight headache. Patient states chronic issue with SOB. Endorses vaginal spotting and denies any vaginal discharge. States she had vaginal spotting three days ago with small clot, patient states that this is more common after intercourse.       Name of GYN Physician: [facility]   OBHx:   x1 c/b PEC  GynHx: h/o ovarian cyst. Denies fibroids, endometriosis, STI's, Abnormal pap smears   PMH: migraines, has recent MRI and follow up with neurology   PSH: none  Meds: tylenol  Allx: NKDA  Social History:  Denies smoking use, drug use, alcohol use.    Vital Signs Last 24 Hrs  T(C): 37 (2024 06:30), Max: 37 (2024 06:30)  T(F): 98.6 (2024 06:30), Max: 98.6 (2024 06:30)  HR: 79 (2024 06:30) (70 - 79)  BP: 95/60 (2024 06:30) (95/60 - 107/72)  BP(mean): 71 (2024 06:30) (71 - 74)  RR: 18 (2024 06:30) (18 - 18)  SpO2: 98% (2024 06:30) (98% - 99%)    Parameters below as of 2024 06:30  Patient On (Oxygen Delivery Method): room air        Physical Exam:   General: sitting comfortably in bed, NAD   Lungs: breathing comfortably on RA  Back: Moderate R CVA tenderness  Abd: Soft, non-tender, non-distended.    :  No bleeding on pad.    External labia wnl.  Bimanual exam with no cervical motion tenderness, uterus wnl, adnexa non palpable b/l.    Speculum Exam: No active bleeding from os.  Physiologic discharge.    Ext: non-tender b/l, no edema     LABS:                              11.7   8.86  )-----------( 225      ( 2024 01:59 )             34.2     04-02    136  |  104  |  6<L>  ----------------------------<  84  3.7   |  21<L>  |  0.57    Ca    9.1      2024 01:59    TPro  6.5  /  Alb  3.7  /  TBili  0.2  /  DBili  x   /  AST  17  /  ALT  27  /  AlkPhos  45  04-02    I&O's Detail      Urinalysis Basic - ( 2024 04:39 )    Color: Yellow / Appearance: Cloudy / S.011 / pH: x  Gluc: x / Ketone: 40 mg/dL  / Bili: Negative / Urobili: 0.2 mg/dL   Blood: x / Protein: Negative mg/dL / Nitrite: Negative   Leuk Esterase: Small / RBC: 9 /HPF / WBC 15 /HPF   Sq Epi: x / Non Sq Epi: 7 /HPF / Bacteria: Few /HPF        RADIOLOGY & ADDITIONAL STUDIES:    ACC: [id number] EXAM:  US DPLX PELVIC   ORDERED BY:  JOHN CAMACHO     ACC: [id number] EXAM:  US OB GRT THAN 14WK EA ADD GES   ORDERED BY:  JOHN CAMACHO     PROCEDURE DATE:  2024          INTERPRETATION:  CLINICAL INFORMATION: Pregnant with pelvic pain.    LMP: 12/15/2023    Estimated Gestational Age by LMP: 15 weeks and 4 days    COMPARISON: Obstetric ultrasound from 2024.    TECHNIQUE: Transabdominal pelvic sonogram. Color and Spectral Doppler was   performed.    FINDINGS:  Uterus: Single intrauterine gestational sac. Anterior placenta. Cervical   length grossly measures 3.3 cm.    Biparietal diameter: 2.6 cm                                      14 weeks   4 day(s)  Femoral length: 1.6 cm14   weeks 4 day(s)    Fetal Heart Rate: 173 bpm.    Right ovary: 2.6 cm x 1.7 cm x 2.2 cm. Within normal limits. Normal   arterial and venous waveforms.  Left ovary: 3.6 cm x 1.8 cm x 3.5 cm. Within normal limits. Normal   arterial and venous waveforms.    Fluid: None.    IMPRESSION:  Single, viable 14 week 4 day intrauterine gestation.    --- End of Report ---      
Chief Complaint:  Patient is a 33y old  Female who presents with a chief complaint of     HPI:   ID [id number]  33F  14 weeks pregnant, hx of H. pylori (1-2 years ago; s/p eradication) presents to the ED with nausea and vomiting and concerns of dysuria. Patient reports long-standing vomiting for the past 9 weeks; normally has between 3-6 episodes of vomiting per day; pt was diagnosed with hyperemesis gravidarum and is taking doxylamine and dramamine with some improvement in her symptoms. Pt reports developing dark brown vomiting yesterday followed by an episode of small volume hematemesis, which prompted her to present to the ED. Of note, pt reports worsening symptoms with 9 episodes of vomiting and retching yesterday prior to CGE/ hematemesis. Pt also reports epigastric abdominal pain described as burning over the past 3-4 weeks along with sensation of dysphagia described as solids getting stuck in her throat; no weight loss; no melena, hematochezia. No FHx of liver disease; no tobacco use; no NSAID use.    Allergies:  No Known Allergies    Home Medications:    Hospital Medications:  pantoprazole Infusion 8 mG/Hr IV Continuous <Continuous>    PMHX/PSHX:  Migraines    Family history:      Denies family history of colon cancer/polyps, stomach cancer/polyps, pancreatic cancer/masses, liver cancer/disease, ovarian cancer and endometrial cancer.    Social History:     Tob: Denies  EtOH: Denies  Illicit Drugs: Denies    ROS:  General:  No wt loss, fevers, chills  ENT:  (+) dysphagia  CV:  No pain, palpitations  Pulm:  No dyspnea, cough  GI:  (+) pain, (+) nausea, (+) vomiting, No diarrhea, No constipation, No rectal bleeding, No tarry stools, No dysphagia,  Muscle:  No pain, weakness  Neuro:  No weakness  Heme:  No ecchymosis  Skin:  No rash    PHYSICAL EXAM:     GENERAL:  No acute distress  HEENT:  no scleral icterus  CHEST:  no accessory muscle use  HEART:  Regular rate and rhythm  ABDOMEN:  Soft, epigastric tenderness, non-distended  EXTREMITIES: No edema  SKIN:  No rash/ecchymoses  NEURO:  Alert and oriented x 3    Vital Signs:  Vital Signs Last 24 Hrs  T(C): 36.9 (2024 07:10), Max: 37 (2024 06:30)  T(F): 98.4 (2024 07:10), Max: 98.6 (2024 06:30)  HR: 71 (2024 07:10) (70 - 79)  BP: 96/59 (2024 07:10) (95/60 - 107/72)  BP(mean): 71 (2024 06:30) (71 - 74)  RR: 18 (2024 07:10) (18 - 18)  SpO2: 100% (2024 07:10) (98% - 100%)    Parameters below as of 2024 07:10  Patient On (Oxygen Delivery Method): room air      Daily Height in cm: 152.4 (2024 01:04)    Daily     LABS:                        11.7   8.86  )-----------( 225      ( 2024 01:59 )             34.2     Mean Cell Volume: 86.4 fl (-24 @ 01:59)    04-    136  |  104  |  6<L>  ----------------------------<  84  3.7   |  21<L>  |  0.57    Ca    9.1      2024 01:59    TPro  6.5  /  Alb  3.7  /  TBili  0.2  /  DBili  x   /  AST  17  /  ALT  27  /  AlkPhos  45  04-02    LIVER FUNCTIONS - ( 2024 01:59 )  Alb: 3.7 g/dL / Pro: 6.5 g/dL / ALK PHOS: 45 U/L / ALT: 27 U/L / AST: 17 U/L / GGT: x             Urinalysis Basic - ( 2024 04:39 )    Color: Yellow / Appearance: Cloudy / S.011 / pH: x  Gluc: x / Ketone: 40 mg/dL  / Bili: Negative / Urobili: 0.2 mg/dL   Blood: x / Protein: Negative mg/dL / Nitrite: Negative   Leuk Esterase: Small / RBC: 9 /HPF / WBC 15 /HPF   Sq Epi: x / Non Sq Epi: 7 /HPF / Bacteria: Few /HPF      Amylase Serum--      Lipase serum25       Ammonia--                          11.7   8.86  )-----------( 225      ( 2024 01:59 )             34.2      
HPI:  33F  (14wks pregnant) w/ PMHx of ESBL UTI (2024) p/w Hematemesis, dysuria. Patient reports that since her 5th week of her pregnancy she has had persistent daily nausea and vomiting w/ decreased PO. Morning of  patient reports she had episode of coffee ground emesis, followed up by additional episode of blood tinged emesis. Patient also reporting that since about 1 week after completing treatment for her UTI she has had dysuria. She states this worsens when she is unable to drink water, and slightly improves with increased water intake, but does not completely resolve. She reports she had chills 3 wks ago and again 3 days ago, and subjective fever 1 day ago. Patient denying hematuria, hematochezia, vaginal bleeding, CP, SOB, suprapubic tenderness. Endorses N/V, epigastric pain, general malaise.    ED course: Pt arrived afebrile, /72 with repeat BP 96/65, HR 76, saturating well on RA. Patient received 1L NS bolus, reglan, zofran, B6, Protonix, 1x Ertapenem. CXR that was WNL and Pelvic US that showed 14wk viable intrauterine pregnancy. (2024 12:55)      PAST MEDICAL & SURGICAL HISTORY:  Migraines      Bacterial UTI          Allergies    No Known Allergies    Intolerances        ANTIMICROBIALS:      OTHER MEDS:  diphenhydrAMINE 25 milliGRAM(s) Oral every 6 hours PRN  metoclopramide 5 milliGRAM(s) Oral every 8 hours PRN  ondansetron    Tablet 4 milliGRAM(s) Oral every 6 hours PRN  pantoprazole  Injectable 40 milliGRAM(s) IV Push two times a day  promethazine Suppository 12.5 milliGRAM(s) Rectal once  pyridoxine 25 milliGRAM(s) Oral every 24 hours  sodium chloride 0.9%. 1000 milliLiter(s) IV Continuous <Continuous>      SOCIAL HISTORY:  lives with family  no smoking, alcohol or drug abuse  no recent travel      FAMILY HISTORY:  no recent febrile illness in family members    ROS:    All other systems negative     Constitutional: no fever, no chills, no weight loss, no night sweats  Eye: no eye pain, no redness, no vision changes  ENT:  no sore throat, no rhinorrhea  Cardiovascular:  no chest pain, no palpitation  Respiratory:  no SOB, no cough  GI:  no abd pain, + vomiting and had some blood, no diarrhea  urinary: + dysuria, no hematuria, + flank pain  : no vaginal discharge or bleeding  musculoskeletal:  no joint pain, no joint swelling  skin:  no rash  neurology:  no headache, no seizure, no change in mental status  psych: no anxiety, no depression     Physical Exam:    General:    NAD, non toxic  Head: atraumatic, normocephalic  Eyes: normal sclera and conjunctiva  ENT:   no oropharyngeal lesions, no LAD, neck supple  Cardio:    regular S1,S2  Respiratory:   clear b/l, no wheezing  abd:   soft, BS +, not tender  :     no CVAT, no suprapubic tenderness, no luther  Musculoskeletal : no joint swelling, no edema  Skin:    no rash  vascular: no phlebitis  Neurologic:     no focal deficits  psych: normal affect      Drug Dosing Weight  Height (cm): 152.4 (2024 01:04)  Weight (kg): 65 (2024 01:04)  BMI (kg/m2): 28 (2024 01:04)  BSA (m2): 1.62 (2024 01:04)    Vital Signs Last 24 Hrs  T(F): 98.4 (24 @ 07:10), Max: 98.6 (24 @ 06:30)    Vital Signs Last 24 Hrs  HR: 71 (24 @ 07:10) (70 - 79)  BP: 96/59 (24 @ 07:10) (95/60 - 107/72)  RR: 18 (24 @ 07:10)  SpO2: 100% (24 @ 07:10) (98% - 100%)  Wt(kg): --                          11.1   7.77  )-----------( 206      ( 2024 12:31 )             32.9       04-    136  |  106  |  6<L>  ----------------------------<  75  3.7   |  20<L>  |  0.49<L>    Ca    8.4      2024 12:31  Phos  3.6     -  Mg     1.9     -    TPro  6.5  /  Alb  3.7  /  TBili  0.2  /  DBili  x   /  AST  17  /  ALT  27  /  AlkPhos  45  04-      Urinalysis Basic - ( 2024 12:31 )    Color: x / Appearance: x / SG: x / pH: x  Gluc: 75 mg/dL / Ketone: x  / Bili: x / Urobili: x   Blood: x / Protein: x / Nitrite: x   Leuk Esterase: x / RBC: x / WBC x   Sq Epi: x / Non Sq Epi: x / Bacteria: x        MICROBIOLOGY:  v              RADIOLOGY:    Images independently visualized and reviewed personally,  findings as below    < from: Xray Chest 1 View AP/PA (24 @ 05:09) >  IMPRESSION:  Clear lungs.      < end of copied text >  < from: US Doppler Pelvis (24 @ 03:46) >    IMPRESSION:  Single, viable 14 week 4 day intrauterine gestation.    < end of copied text >

## 2024-04-02 NOTE — H&P ADULT - ATTENDING COMMENTS
33F  (14wks pregnant) w/ PMHx of ESBL UTI (2024) p/w Hematemesis, dysuria.   # Hematemesis   # Hyperemesis gravidarum  - Hgb 11.7--> 11, hemodynamically stable  - review BMP Mg Phos, no significant electrolyte derangement   - personally reviewed CXR clear lungs, no focal consolidation, no mediastinal enlargement  - personally reviewed ECG, normal sinus rhythm, no acute ischemia ; troponin < 6  - s/p GI eval, current no plan for endoscope eval   - s/p OB mckenna, recommend         - LR with additives - Thiamine 100mg, Folic acid 1mg, 10 ml multivitamin, 2g Mg sulfate, 20mg Pyridoxine - infuse x8 hrs          - Zofran 4mg q6-8 hrs         - Reglan 5-10mg q 6-8 hrs        - Phenergan 12.5 suppository          - Pyridoxine 20mg q24 hrs         - Benadryl 10-50mg q4-6 hrs  - c/w IV protonix   - NPO  - trend CBC q12h; daily BMP Mg phos, replete lytes PRN     # UTI   - UA + leuk estrase, WBC   - + persistent dysuria, reports fever/chills at home, exam notable for costovertebral angle tenderness, concern for pyelonephritis   - previous UCx reviewed ESBL E coli   - s/p ertapenem x1 in ED, c/w ertapenem   - f/u UCx, BCx   - f/u renal US   - ID consulted, f/u rec     # Intrauterine pregnancy  - reviewed OB ultrasound, 14 week viable intrauterine pregnancy   - OB following, recs appreciated   - Daily FH check  - avoid medications that are teratogenic in pregnancy.    This is a high risk patient ---- 14 weeks pregnant with recurrent UTI concern for pyelonephritis, persistent N/V with hematemesis, at high risk of decompensation, low threshold for ICU consultation.     D/w resident team 33F  (14wks pregnant) w/ PMHx of ESBL UTI (2024) p/w Hematemesis, dysuria.   # Hematemesis   # Hyperemesis gravidarum  - Hgb 11.7--> 11, hemodynamically stable  - review BMP Mg Phos, no significant electrolyte derangement   - personally reviewed CXR clear lungs, no focal consolidation, no mediastinal enlargement  - personally reviewed ECG, normal sinus rhythm, no acute ischemia ; troponin < 6  - s/p GI eval, current no plan for endoscope eval   - s/p OB mckenna, recommend         - LR with additives - Thiamine 100mg, Folic acid 1mg, 10 ml multivitamin, 2g Mg sulfate, 20mg Pyridoxine - infuse x8 hrs          - Zofran 4mg q6-8 hrs         - Reglan 5-10mg q 6-8 hrs        - Phenergan 12.5 suppository          - Pyridoxine 20mg q24 hrs         - Benadryl 10-50mg q4-6 hrs  - c/w IV protonix   - NPO  - trend CBC q12h; daily BMP Mg phos, replete lytes PRN     # UTI vs. pyelonephritis   - UA + leuk estrase, WBC   - + persistent dysuria, reports fever/chills at home, exam notable for costovertebral angle tenderness, concern for pyelonephritis   - previous UCx reviewed ESBL E coli   - s/p ertapenem x1 in ED, c/w ertapenem   - f/u UCx, BCx   - f/u renal US   - ID consulted, f/u rec     # Intrauterine pregnancy  - reviewed OB ultrasound, 14 week viable intrauterine pregnancy   - OB following, recs appreciated   - Daily FH check  - avoid medications that are teratogenic in pregnancy.    This is a high risk patient ---- 14 weeks pregnant with recurrent UTI concern for pyelonephritis, persistent N/V with hematemesis, at high risk of decompensation, low threshold for ICU consultation.     D/w resident team

## 2024-04-02 NOTE — ED ADULT NURSE NOTE - NSFALLUNIVINTERV_ED_ALL_ED
Bed/Stretcher in lowest position, wheels locked, appropriate side rails in place/Call bell, personal items and telephone in reach/Instruct patient to call for assistance before getting out of bed/chair/stretcher/Non-slip footwear applied when patient is off stretcher/[place] to call system/Physically safe environment - no spills, clutter or unnecessary equipment/Purposeful proactive rounding/Room/bathroom lighting operational, light cord in reach

## 2024-04-02 NOTE — ED PROVIDER NOTE - PHYSICAL EXAMINATION
Physical Exam:  Gen: NAD, non-toxic appearing  Head: NCAT  HEENT: Normal conjunctiva, trachea midline, dry mucous membranes  Lung: CTAB, no respiratory distress, no wheezes/rhonchi/rales B/L, speaking in full sentences  CV: RRR, no murmurs, rubs or gallops  Abd: Bilateral CVA tenderness, epigastric and suprapubic tenderness, soft  MSK: No visible deformities, moves all four extremities   Neuro: No focal motor deficits  Skin: Warm, well perfused, no visible rashes, no leg swelling  Psych: appropriate affect and mood Physical Exam:  Gen: NAD, non-toxic appearing  Head: NCAT  HEENT: Normal conjunctiva, trachea midline, dry mucous membranes  chest wall: No crepitus   Lung: CTAB, no respiratory distress, no wheezes/rhonchi/rales B/L, speaking in full sentences  CV: RRR, no murmurs, rubs or gallops  Abd: Bilateral CVA tenderness, epigastric and suprapubic tenderness, soft  MSK: No visible deformities, moves all four extremities   Neuro: No focal motor deficits  Skin: Warm, well perfused, no visible rashes, no leg swelling  Psych: appropriate affect and mood

## 2024-04-02 NOTE — ED PROVIDER NOTE - CLINICAL SUMMARY MEDICAL DECISION MAKING FREE TEXT BOX
Patient is a 33-year-old female G2, P1 at 14 weeks pregnant who presents with abdominal pain, and hematemesis. Concern for upper GI bleed and pyelonephritis.  Plan for CBC, CMP, urine analysis, urine culture transvaginal ultrasound.  Patient endorses chest pain and shortness of breath, most likely, labs due to vomiting, will plan for chills and chest x-ray.  Plan for chest x-ray to also evaluate for possible Borrheave, no chest crepitus, otherwise low suspicion likely caused by Nan-Dorman and possible gastritis.  Patient has a history of EBSL E. and UTI and is also pregnant.  Will be an admission to medicine for pyelonephritis. Patient is a 33-year-old female G2, P1 at 14 weeks pregnant who presents with abdominal pain, and hematemesis. Concern for upper GI bleed and pyelonephritis.  Plan for CBC, CMP, urine analysis, urine culture transvaginal ultrasound.  Patient endorses chest pain and shortness of breath, most likely, labs due to vomiting, will plan for chills and chest x-ray.  Plan for chest x-ray to also evaluate for possible Borrheave, no chest crepitus, otherwise low suspicion likely caused by Ann-Dorman and possible gastritis.  Patient has a history of EBSL E. coli and UTI and is also pregnant.  Plan to treat with invanz. Will be an admission to medicine for pyelonephritis.

## 2024-04-02 NOTE — CONSULT NOTE ADULT - ASSESSMENT
33F  (14wks pregnant), was hospitalized 2024 after she tested positive for pregnancy with some lower abd cramping and vaginal bleeding but also stated she had some dysuria earlier and urine cx showed ESBL and she was given keflex but her urine cx on admission was negative, she was treated with a 5 day course of ertapenem anyway, she has vomiting afterwards due to hyperemesis gravidarum but now had some blood in the vomitus, she also c/o dysuria, lower abd pain, flank pain, chills  here afebrile WBC normal  u/a with pyuria but contaminated (had epithelial cells)  was seen by GI and was considered esophagitis due to vomiting    UTI in pregnancy, had ESBL last time  vomiting and ?hematemesis   * f/u the blood and urine cx  * c/w ertapenem 1 qd  * f/u with GI    The above assessment and plan was discussed with the primary team    Mechelle Hobson MD  contact on teams  After 5pm and on weekends call [phone]

## 2024-04-02 NOTE — CONSULT NOTE ADULT - ASSESSMENT
33F  14 weeks pregnant, hx of H. pylori (1-2 years ago; s/p eradication) presents to the ED with nausea and vomiting and concerns of dysuria    Impression  #CGE/ hematemesis; suspect due to esophagitis in setting of nausea vomiting of pregnancy; other less likely DDx include PUD vs. less likely malignancy  #dysphagia; suspect also related to esophagitis given time frame of dysphagia over last 4 weeks with vomiting over last 9 weeks  #epigastric abdominal pain  - no FHx of GI cancer; no tobacco use; no hx of liver disease  - hemodynamically stable  - Hgb stable 11.7 <- 11.9  - no previous EGD    Recommendations  - no plans for endoscopic evaluation as risks outweigh benefits at this time  - IV PPI BID; recommend 8 week course for presumed esophagitis;   - management of nausea vomiting of pregnancy per OB  - trend H/H    Note and recommendations are incomplete until reviewed and attested by attending.    Olesya Gilbert MD  GI/Hepatology Fellow, PGY4  Teams preferred (7AM to 5PM); after 5PM, call GI fellow on call    NEW CONSULTS:  Please email [email or web address]  14 weeks pregnant, hx of H. pylori (1-2 years ago; s/p eradication) presents to the ED with nausea and vomiting and concerns of dysuria    Impression  #CGE/ hematemesis; suspect due to esophagitis in setting of nausea vomiting of pregnancy; other less likely DDx include PUD vs. less likely malignancy  #dysphagia; inconsistent history; reporting difficulty with swallowing liquids not to solids; suspect possibly related to esophagitis given time frame of dysphagia over last 4 weeks with vomiting over last 9 weeks  #epigastric abdominal pain  - no FHx of GI cancer; no tobacco use; no hx of liver disease  - hemodynamically stable  - Hgb stable 11.7 <- 11.9  - no previous EGD    Recommendations  - no plans for endoscopic evaluation as risks outweigh benefits at this time  - IV PPI BID; recommend 8 week course for presumed esophagitis  - management of nausea vomiting of pregnancy per OB  - trend H/H  - will sign off at this time, however please call back with questions or should any worsening/persistent issues arise. Follow up in Gastroenterology Clinic: [phone] ([facility] at [address] [place] [address]) or [phone] ([facility] at [address] [place] [address]) or [phone] ([facility] at [address] [place] [address])  or [facility]: [phone] ([address] [place] [address]. [address] [place], [place], [address])    Note and recommendations are incomplete until reviewed and attested by attending.    Olesya Gilbert MD  GI/Hepatology Fellow, PGY4  Teams preferred (7AM to 5PM); after 5PM, call GI fellow on call    NEW CONSULTS:  Please email [email or web address]

## 2024-04-02 NOTE — H&P ADULT - HISTORY OF PRESENT ILLNESS
33F  (14wks pregnant) w/ PMHx of ESBL UTI (2024) p/w Hematemesis, dysuria. Patient reports that since her 5th week of her pregnancy she has had persistent daily nausea and vomiting w/ decreased PO. Morning of  patient reports she had episode of coffee ground emesis, followed up by additional episode of blood tinged emesis. Patient also reporting that since about 1 week after completing treatment for her UTI she has had dysuria. She states this worsens when she is unable to drink water, and slightly improves with increased water intake, but does not completely resolve. She reports she had chills 3 wks ago and again 3 days ago, and subjective fever 1 day ago. Patient denying hematuria, hematochezia, vaginal bleeding, CP, SOB, suprapubic tenderness. Endorses N/V, epigastric pain, general malaise.    ED course: Pt arrived afebrile, /72 with repeat BP 96/65, HR 76, saturating well on RA. Patient received 1L NS bolus, reglan, zofran, B6, Protonix, 1x Ertapenem. CXR that was WNL and Pelvic US that showed 14wk viable intrauterine pregnancy.

## 2024-04-02 NOTE — CONSULT NOTE ADULT - ASSESSMENT
32yo  with WILLIAM 10/3/24 13w5d presents to ED with nausea and vomiting and concerns of dysuria. Patient admitted for pyleo with recent admission for pyleo on -2/15 and was tx with Invanz. Patient states dysuria shortly followed her discharge and was no tx by private OB. Nausea and vomiting common for patient in this pregnancy and has noticed that her Diclegis and Dramamine has not help recently with PO tolerance Has had increased nausea and Vomitting in the last48 hrs with coffee grind emesis and bright red bleeding from emesis at 130a this morning. Has never had emesis like this before in this pregnancy. Patient has noticed on and off vaginal spotting in this pregnancy with PE showing not signs of active bleeding and TVUS with 3.3cm CL.    Plan  - LR with additives - Thiamine 100mg, Folic acid 1mg, 10 ml multivitamin, 2g Mg sulfate, 20mg Pyridoxine - infuse x8 hrs   - Zofran 4mg q6-8 hrs   - Reglan 5-10mg q 6-8 hrs  - Phenergan 12.5 suppository   - Pyridoxine 20mg q24 hrs  - Benadryl 10-50mg q4-6 hrs  - Daily FH check  - CBC, BMP  - Replete electrolytes PRN   - would recommend Protonix for acid reflux  - will send affirm for concern of vaginal discharge on PE.  - treatment of pyleo per primary team, avoid medications that are teratogenic in pregnancy.      Haldey PGY2  dw Dr. Anne        34yo  with WILLIAM 10/3/24 13w5d presents to ED with nausea and vomiting and concerns of dysuria. Patient admitted for pyleo with recent admission for pyleo on -2/15 and was tx with Invanz. Patient states dysuria shortly followed her discharge and was no tx by private OB. Nausea and vomiting common for patient in this pregnancy and has noticed that her Diclegis and Dramamine has not help recently with PO tolerance Has had increased nausea and Vomitting in the last48 hrs with coffee grind emesis and bright red bleeding from emesis at 130a this morning. Has never had emesis like this before in this pregnancy. Patient has noticed on and off vaginal spotting in this pregnancy with PE showing not signs of active bleeding and TVUS with 3.3cm CL.    Plan  - LR with additives - Thiamine 100mg, Folic acid 1mg, 10 ml multivitamin, 2g Mg sulfate, 20mg Pyridoxine - infuse x8 hrs   - Zofran 4mg q6-8 hrs   - Reglan 5-10mg q 6-8 hrs  - Phenergan 12.5 suppository   - Pyridoxine 20mg q24 hrs  - Benadryl 10-50mg q4-6 hrs  - Daily FH check  - CBC, BMP  - Replete electrolytes PRN   - would recommend Protonix for acid reflux  - will send affirm for concern of vaginal discharge on PE.  - f/u GC vaginal swab   - treatment of pyleo per primary team, avoid medications that are teratogenic in pregnancy.      DTeugenio PGY2  dw Dr. Anne     please give information about resident clinic    [facility] at the [facility]  [phone]  Fax: [phone] [address], [address]  [place] [address]     32yo  with WILLIAM 10/3/24 13w5d presents to ED with nausea and vomiting and concerns of dysuria. Patient admitted for pyleo with recent admission for pyleo on -2/15 and was tx with Invanz. Patient states dysuria shortly followed her discharge and was no tx by private OB. Nausea and vomiting common for patient in this pregnancy and has noticed that her Diclegis and Dramamine has not help recently with PO tolerance Has had increased nausea and Vomitting in the last48 hrs with coffee grind emesis and bright red bleeding from emesis at 130a this morning. Has never had emesis like this before in this pregnancy. Patient has noticed on and off vaginal spotting in this pregnancy with PE showing not signs of active bleeding and TVUS with 3.3cm CL.    Plan  - LR with additives - Thiamine 100mg, Folic acid 1mg, 10 ml multivitamin, 2g Mg sulfate, 20mg Pyridoxine - infuse x8 hrs   - Zofran 4mg q6-8 hrs   - Reglan 5-10mg q 6-8 hrs  - Phenergan 12.5 suppository   - Pyridoxine 20mg q24 hrs  - Benadryl 10-50mg q4-6 hrs  - FH check on discharge, please call on day of discharge   - CBC, BMP  - Replete electrolytes PRN   - would recommend Protonix for acid reflux  - will send affirm for concern of vaginal discharge on PE.  - f/u GC vaginal swab   - treatment of pyleo per primary team, avoid medications that are teratogenic in pregnancy.      DTeugenio PGY2  dw Dr. Anne     please give information about resident clinic    [facility]  [phone]  Fax: [phone] [address], [address]  [place] [address]

## 2024-04-02 NOTE — H&P ADULT - PROBLEM SELECTOR PLAN 3
DVT: Lovenox  Diet: Regular  Dispo: Home DVT: hold chemical DVT prophylaxis in setting of hematemesis ; SCD   Diet: NPO   Dispo: pending medical improvement

## 2024-04-02 NOTE — ED ADULT NURSE REASSESSMENT NOTE - NS ED NURSE REASSESS COMMENT FT1
received awake alert an orientedx4 translated per med staff. Pt OK with this. Denies fever , chill, n/v. Admitted.

## 2024-04-02 NOTE — H&P ADULT - PROBLEM SELECTOR PLAN 2
- Nan Dorman vs Gastritis  - CXR unremarkable  - Hgb stable    Plan:  - GI consulted, appreciate recs:   >No acute intervention    - OBGYN following, appreciate recs:  > Anti-emetic regimen per OBGYN recs put in  > Daily FH check    - c/w Protonix - Nan Dorman vs Gastritis  - CXR unremarkable  - Hgb 11.7--> 11     Plan:  - GI consulted, appreciate recs:   >No acute intervention  - NPO for now   - c/w IV protonix   - CBC q12h; active type and screen ; maintain 2 large bore IV   - OBGYN following, appreciate recs:  > Anti-emetic regimen per OBGYN recs put in  > Daily FH check    - c/w Protonix

## 2024-04-02 NOTE — H&P ADULT - ASSESSMENT
33F  (14wks pregnant) w/ PMHx of ESBL UTI (2024) p/w Hematemesis, dysuria found to have recurrent UTI on UA. Hematemesis likely 2/2 Nan Dorman tear i/s/o daily persistent emesis.

## 2024-04-02 NOTE — H&P ADULT - PROBLEM SELECTOR PLAN 1
- UA showing UTI  - PMHx of ESBL UTI (Feb 2024)  - s/p 1x Ertapenam in ED  - CVA tenderness on exam    Plan:  - f/u UCx, BCx  - c/w Ertapenem  - f/u Renal US  - ID consult i/s/o recurrent UTI and prior ESBL - UA positive for leuk esterase, WBC , new from last UA   - PMHx of ESBL UTI (Feb 2024)  - s/p 1x Ertapenam in ED  - CVA tenderness on exam    Plan:  - f/u UCx, BCx  - c/w Ertapenem  - f/u Renal US to evaluate for pyelonephritis   - ID consult i/s/o recurrent UTI and prior ESBL

## 2024-04-02 NOTE — H&P ADULT - NSHPREVIEWOFSYSTEMS_GEN_ALL_CORE
REVIEW OF SYSTEMS:    CONSTITUTIONAL: chills, weakness  EYES/ENT: No visual changes;  No vertigo or throat pain   NECK: No pain or stiffness  RESPIRATORY: No cough, wheezing, hemoptysis; No shortness of breath  CARDIOVASCULAR: No chest pain or palpitations  GASTROINTESTINAL: N/V, epigastric pain. No diarrhea or constipation. No melena or hematochezia.  GENITOURINARY: Dysuria, No frequency or hematuria  NEUROLOGICAL: No numbness or weakness  SKIN: No itching, rashes

## 2024-04-02 NOTE — ED PROVIDER NOTE - OBJECTIVE STATEMENT
Patient is a 33-year-old female G2, P1 at 14 weeks pregnant who presents with abdominal pain, and hematochezia.  According to patient she has had hyperemesis gravidarum starting around weeks 6 or 8, has been taking Dramamine and doxylamine at home with minimal relief.  Today she reported an episode of coffee-ground emesis followed by an episode of bright red blood.  She also endorses chest pain and difficulty breathing.  She endorses mostly epigastric and suprapubic pain.  Of note she was recently admitted for E BSL E. coli, since her discharge from the hospital she is followed about OB/GYN but continues to have dysuria.  She endorses subjective fevers and chills 1 day ago.  She denies any hematochezia, diarrhea, or vaginal bleeding. Patient is a 33-year-old female G2, P1 at 14 weeks pregnant who presents with abdominal pain, and hematemesis. According to patient she has had hyperemesis gravidarum starting around weeks 6 or 8, has been taking Dramamine and doxylamine at home with minimal relief.  Today she reported an episode of coffee-ground emesis followed by an episode of bright red blood.  She also endorses chest pain and difficulty breathing.  She endorses mostly epigastric and suprapubic pain.  Of note she was recently admitted for E BSL E. coli, since her discharge from the hospital she is followed about OB/GYN but continues to have dysuria.  She endorses subjective fevers and chills 1 day ago.  She denies any hematochezia, diarrhea, or vaginal bleeding.

## 2024-04-02 NOTE — CONSULT NOTE ADULT - ATTENDING COMMENTS
Agree with above. Discussed with patient with , she reports some odynophagia and dysphagia after the vomiting in the last 2 days, but changes the history when asked about abdominal pain and is inconsistent. She does not have signs of significant bleeding - H/H is stable and has no melena. Given no continued bleeding or significant bleeding, would not recommend endoscopic evaluation at this time as risks likely outweighs benefits. Would treat nausea with anti-emetics, can give PPI for now, which in observational studies have not shown evidence of fetal side effects. Suspect minor hematemesis is due to retching and small mucosal irritation.
Patient seen in the ED with the Resident and above note reviewed.    Patient is a 34 yo  at 13 5/7 wks GA by LMP admitted to medicine for pyelonephritis, the second admission this pregnancy. We were consulted for her symptoms of nausea and emesis that has progressed to coffee grind emesis this am. She has not been on antacids or antiemetics this pregnancy. She had episodic bleeding this pregnancy and it is worse after intercourse. There is a +FH today. Her recent symptom is dark blood in the vagina and no pain.   Her care has been at the [facility] OB clinic but she comes to [facility] for her pyelonephritis infections    Prior medical and surgical history as above.    A/P: IUP at 13 5/7 weeks with pyelonephritis-admitted to Medicine  -Patient to try the antiemetic regimen as detailed in resident note  -Pt to try to maintain consistency of care through her pregnancy with one OB provider group  -Preventative ABX for UTI in the pregnancy after treatment is successful for her current infection  -Protonix 20 mg bid  -Spotting could be cervicitis-BV pending-pt has had care for this issue but those records were not available for review.  -Call with any questions.  Luz Marina Anne MD

## 2024-04-02 NOTE — H&P ADULT - NSHPPHYSICALEXAM_GEN_ALL_CORE
PHYSICAL EXAM:  GENERAL: NAD, lying in bed comfortably  HEENT: NC/AT  NECK: Supple, No JVD  CHEST/LUNG: CTAB, no increased WOB  HEART: RRR, no m/r/g, +2 pulses bilaterally  ABDOMEN: soft, tender in epigastrum  : b/l CVA tenderness, more on R than L  EXTREMITIES:  2+ peripheral pulses, no clubbing, no edema  NERVOUS SYSTEM:  A&Ox3  SKIN: No rashes or lesions

## 2024-04-03 ENCOUNTER — NON-APPOINTMENT (OUTPATIENT)
Age: 34
End: 2024-04-03

## 2024-04-03 ENCOUNTER — TRANSCRIPTION ENCOUNTER (OUTPATIENT)
Age: 34
End: 2024-04-03

## 2024-04-03 DIAGNOSIS — E87.20 ACIDOSIS, UNSPECIFIED: ICD-10-CM

## 2024-04-03 LAB
ANION GAP SERPL CALC-SCNC: 16 MMOL/L — SIGNIFICANT CHANGE UP (ref 5–17)
BASOPHILS # BLD AUTO: 0.02 K/UL — SIGNIFICANT CHANGE UP (ref 0–0.2)
BASOPHILS NFR BLD AUTO: 0.3 % — SIGNIFICANT CHANGE UP (ref 0–2)
BUN SERPL-MCNC: 7 MG/DL — SIGNIFICANT CHANGE UP (ref 7–23)
CALCIUM SERPL-MCNC: 8.6 MG/DL — SIGNIFICANT CHANGE UP (ref 8.4–10.5)
CHLORIDE SERPL-SCNC: 104 MMOL/L — SIGNIFICANT CHANGE UP (ref 96–108)
CO2 SERPL-SCNC: 15 MMOL/L — LOW (ref 22–31)
CREAT SERPL-MCNC: 0.49 MG/DL — LOW (ref 0.5–1.3)
CULTURE RESULTS: SIGNIFICANT CHANGE UP
EGFR: 128 ML/MIN/1.73M2 — SIGNIFICANT CHANGE UP
EOSINOPHIL # BLD AUTO: 0.26 K/UL — SIGNIFICANT CHANGE UP (ref 0–0.5)
EOSINOPHIL NFR BLD AUTO: 3.7 % — SIGNIFICANT CHANGE UP (ref 0–6)
GLUCOSE SERPL-MCNC: 77 MG/DL — SIGNIFICANT CHANGE UP (ref 70–99)
HCT VFR BLD CALC: 32.8 % — LOW (ref 34.5–45)
HGB BLD-MCNC: 11.1 G/DL — LOW (ref 11.5–15.5)
IMM GRANULOCYTES NFR BLD AUTO: 0.4 % — SIGNIFICANT CHANGE UP (ref 0–0.9)
LYMPHOCYTES # BLD AUTO: 2.16 K/UL — SIGNIFICANT CHANGE UP (ref 1–3.3)
LYMPHOCYTES # BLD AUTO: 30.9 % — SIGNIFICANT CHANGE UP (ref 13–44)
MAGNESIUM SERPL-MCNC: 2 MG/DL — SIGNIFICANT CHANGE UP (ref 1.6–2.6)
MCHC RBC-ENTMCNC: 29.7 PG — SIGNIFICANT CHANGE UP (ref 27–34)
MCHC RBC-ENTMCNC: 33.8 GM/DL — SIGNIFICANT CHANGE UP (ref 32–36)
MCV RBC AUTO: 87.7 FL — SIGNIFICANT CHANGE UP (ref 80–100)
MONOCYTES # BLD AUTO: 0.53 K/UL — SIGNIFICANT CHANGE UP (ref 0–0.9)
MONOCYTES NFR BLD AUTO: 7.6 % — SIGNIFICANT CHANGE UP (ref 2–14)
MRSA PCR RESULT.: SIGNIFICANT CHANGE UP
NEUTROPHILS # BLD AUTO: 3.99 K/UL — SIGNIFICANT CHANGE UP (ref 1.8–7.4)
NEUTROPHILS NFR BLD AUTO: 57.1 % — SIGNIFICANT CHANGE UP (ref 43–77)
NIGHT BLUE STAIN TISS: SIGNIFICANT CHANGE UP
NRBC # BLD: 0 /100 WBCS — SIGNIFICANT CHANGE UP (ref 0–0)
PHOSPHATE SERPL-MCNC: 3.5 MG/DL — SIGNIFICANT CHANGE UP (ref 2.5–4.5)
PLATELET # BLD AUTO: 221 K/UL — SIGNIFICANT CHANGE UP (ref 150–400)
POTASSIUM SERPL-MCNC: 3.9 MMOL/L — SIGNIFICANT CHANGE UP (ref 3.5–5.3)
POTASSIUM SERPL-SCNC: 3.9 MMOL/L — SIGNIFICANT CHANGE UP (ref 3.5–5.3)
RBC # BLD: 3.74 M/UL — LOW (ref 3.8–5.2)
RBC # FLD: 12.4 % — SIGNIFICANT CHANGE UP (ref 10.3–14.5)
S AUREUS DNA NOSE QL NAA+PROBE: SIGNIFICANT CHANGE UP
SODIUM SERPL-SCNC: 135 MMOL/L — SIGNIFICANT CHANGE UP (ref 135–145)
SPECIMEN SOURCE: SIGNIFICANT CHANGE UP
SPECIMEN SOURCE: SIGNIFICANT CHANGE UP
WBC # BLD: 6.99 K/UL — SIGNIFICANT CHANGE UP (ref 3.8–10.5)
WBC # FLD AUTO: 6.99 K/UL — SIGNIFICANT CHANGE UP (ref 3.8–10.5)

## 2024-04-03 PROCEDURE — 99232 SBSQ HOSP IP/OBS MODERATE 35: CPT

## 2024-04-03 RX ORDER — ONDANSETRON 8 MG/1
1 TABLET, FILM COATED ORAL
Qty: 56 | Refills: 0
Start: 2024-04-03 | End: 2024-04-16

## 2024-04-03 RX ORDER — SODIUM CHLORIDE 9 MG/ML
500 INJECTION INTRAMUSCULAR; INTRAVENOUS; SUBCUTANEOUS ONCE
Refills: 0 | Status: COMPLETED | OUTPATIENT
Start: 2024-04-03 | End: 2024-04-03

## 2024-04-03 RX ORDER — PYRIDOXINE HCL (VITAMIN B6) 100 MG
1 TABLET ORAL
Qty: 30 | Refills: 0
Start: 2024-04-03 | End: 2024-05-02

## 2024-04-03 RX ORDER — SODIUM CHLORIDE 9 MG/ML
1000 INJECTION, SOLUTION INTRAVENOUS
Refills: 0 | Status: DISCONTINUED | OUTPATIENT
Start: 2024-04-03 | End: 2024-04-04

## 2024-04-03 RX ORDER — CHLORHEXIDINE GLUCONATE 213 G/1000ML
1 SOLUTION TOPICAL DAILY
Refills: 0 | Status: DISCONTINUED | OUTPATIENT
Start: 2024-04-03 | End: 2024-04-04

## 2024-04-03 RX ORDER — METOCLOPRAMIDE HCL 10 MG
1 TABLET ORAL
Qty: 42 | Refills: 0
Start: 2024-04-03 | End: 2024-04-16

## 2024-04-03 RX ADMIN — SODIUM CHLORIDE 500 MILLILITER(S): 9 INJECTION INTRAMUSCULAR; INTRAVENOUS; SUBCUTANEOUS at 06:07

## 2024-04-03 RX ADMIN — ONDANSETRON 4 MILLIGRAM(S): 8 TABLET, FILM COATED ORAL at 16:05

## 2024-04-03 RX ADMIN — PANTOPRAZOLE SODIUM 40 MILLIGRAM(S): 20 TABLET, DELAYED RELEASE ORAL at 17:49

## 2024-04-03 RX ADMIN — ERTAPENEM SODIUM 120 MILLIGRAM(S): 1 INJECTION, POWDER, LYOPHILIZED, FOR SOLUTION INTRAMUSCULAR; INTRAVENOUS at 06:07

## 2024-04-03 RX ADMIN — Medication 5 MILLIGRAM(S): at 22:11

## 2024-04-03 RX ADMIN — SODIUM CHLORIDE 75 MILLILITER(S): 9 INJECTION, SOLUTION INTRAVENOUS at 11:36

## 2024-04-03 RX ADMIN — Medication 25 MILLIGRAM(S): at 22:12

## 2024-04-03 RX ADMIN — PANTOPRAZOLE SODIUM 40 MILLIGRAM(S): 20 TABLET, DELAYED RELEASE ORAL at 06:07

## 2024-04-03 NOTE — DISCHARGE NOTE PROVIDER - NSDCCPCAREPLAN_GEN_ALL_CORE_FT
PRINCIPAL DISCHARGE DIAGNOSIS  Diagnosis: Hyperemesis gravidarum  Assessment and Plan of Treatment: You were admitted to the hospital due to persistant vomiting and inabiltiy to eat food. You also initially mentioned having some blood in yuor vomit. In order to further evaluate this you were seen by the gastroenterolgoy team that did not recommend any further intervention. You were also seen by the OBGYN team that recommended several medications to help with you nausea and vomiting, and improved.  Please follow up with your primary care doctor, OBGYN, and Gastroenterology.  If you start to experience symptoms including but not limited to: inability to eat, bloody vomit, ,please return to the emergency room.      SECONDARY DISCHARGE DIAGNOSES  Diagnosis: Recurrent UTI  Assessment and Plan of Treatment: While in the hospital you were complaining of burning with urination, so there was a concern for a urinary tract infection. In order to further evluate this you recieved a urinalysis which also showed concern for an infection. You recieved antibiotics, but eventually further studies showed that there was no bacteria in your urine, so the antibitocs were stopped.  Please follow up with your primary care doctor.   If you start to experience symptoms including but not limited to: increasing burning with urination, blood in urine, worsening side or back pain, fevers, chills, please return to the emergency room.

## 2024-04-03 NOTE — DISCHARGE NOTE PROVIDER - CARE PROVIDER_API CALL
Justine Peralta.  Family Medicine  [address] [place] [address], [address]  [place] [address]  Phone: [phone]  Fax: [phone]  Established Patient  Follow Up Time: 2 weeks   Justine Peralta  Family Medicine  [address] [place] [address], [address]  [place] [address]  Phone: [phone]  Fax: [phone]  Established Patient  Follow Up Time: 2 weeks    Mechelle Hobson)  Infectious Disease  [address] [place] [id number]  Phone: [phone]  Fax: [phone]  Follow Up Time: 1 week

## 2024-04-03 NOTE — DISCHARGE NOTE PROVIDER - CARE PROVIDERS DIRECT ADDRESSES
[email or web address]

## 2024-04-03 NOTE — DISCHARGE NOTE PROVIDER - HOSPITAL COURSE
33F  (14wks pregnant) w/ PMHx of ESBL UTI (2024) p/w Hematemesis, dysuria. Patient reports that since her 5th week of her pregnancy she has had persistent daily nausea and vomiting w/ decreased PO. Morning of  patient reports she had episode of coffee ground emesis, followed up by additional episode of blood tinged emesis. Patient also reporting that since about 1 week after completing treatment for her UTI she has had dysuria. ED course: Pt arrived afebrile, /72 with repeat BP 96/65, HR 76, saturating well on RA. Patient received 1L NS bolus, reglan, zofran, B6, Protonix, 1x Ertapenem. CXR that was WNL and Pelvic US that showed 14wk viable intrauterine pregnancy. Patient was evaluated by OBGYN and recommended several meds top control her N/V. UCx resulted with no growth so Ertapenem was subsequently discontinued. Patient's N/V were well controlled with the medications and she was able to tolerate PO intake.    On day of discharge, patient is clinically stable with no new exam findings or acute symptoms compared to prior. The patient was seen by the attending physician on the date of discharge and deemed stable and acceptable for discharge. The patient's chronic medical conditions were treated accordingly per the patient's home medication regimen. The patient's medication reconciliation (with changes made to chronic medications), follow up appointments, discharge orders, instructions, and significant lab and diagnostic studies are as noted.    Patient will be discharged to home with close follow up. 33F  (14wks pregnant) w/ PMHx of ESBL UTI (2024) p/w Hematemesis, dysuria. Patient reports that since her 5th week of her pregnancy she has had persistent daily nausea and vomiting w/ decreased PO. Morning of  patient reports she had episode of coffee ground emesis, followed up by additional episode of blood tinged emesis. Patient also reporting that since about 1 week after completing treatment for her UTI she has had dysuria. ED course: Pt arrived afebrile, /72 with repeat BP 96/65, HR 76, saturating well on RA. Patient received 1L NS bolus, reglan, zofran, B6, Protonix, 1x Ertapenem. CXR that was WNL and Pelvic US that showed 14wk viable intrauterine pregnancy. Patient was evaluated by OBGYN and recommended several meds top control her N/V. UCx resulted with no growth so Ertapenem was subsequently discontinued. Follow up Urine AFB was also negative. Patient's N/V were well controlled with Zofran and Reglan and she was able to tolerate PO intake.     On day of discharge, patient is clinically stable with no new exam findings or acute symptoms compared to prior. The patient was seen by the attending physician on the date of discharge and deemed stable and acceptable for discharge. The patient's chronic medical conditions were treated accordingly per the patient's home medication regimen. The patient's medication reconciliation (with changes made to chronic medications), follow up appointments, discharge orders, instructions, and significant lab and diagnostic studies are as noted.    Patient will be discharged to home with close follow up.    Medications:  - Zofran  - Reglan 33F  (14wks pregnant) w/ PMHx of ESBL UTI (2024) p/w Hematemesis, dysuria. Patient reports that since her 5th week of her pregnancy she has had persistent daily nausea and vomiting w/ decreased PO. Morning of  patient reports she had episode of coffee ground emesis, followed up by additional episode of blood tinged emesis. Patient also reporting that since about 1 week after completing treatment for her UTI she has had dysuria. ED course: Pt arrived afebrile, /72 with repeat BP 96/65, HR 76, saturating well on RA. Patient received 1L NS bolus, reglan, zofran, B6, Protonix, 1x Ertapenem. CXR that was WNL and Pelvic US that showed 14wk viable intrauterine pregnancy. Patient was evaluated by OBGYN and recommended several meds to control her N/V. UCx resulted with no growth, BCX negative, Vaginal AFFIRM ( Trichomonas, Gardnerella, candida) negative; cervical GC negative, renal US no hydronephrosis/pyelonephrisis, Per ID Rec,  Ertapenem was subsequently discontinued. Follow up Urine AFB by fluorochrome stain was also negative. Patient's N/V were well controlled with Zofran and Reglan and she was able to tolerate PO intake. Patient also s/p GI eval, consider symptoms likely 2/2 esophagitis in setting of persistent nausea/vomiting, endoscope not recommended. Hgb remains stable, no further hematemesis episodes while inpatient.     On day of discharge, patient is clinically stable with no new exam findings or acute symptoms compared to prior. The patient was seen by the attending physician on the date of discharge and deemed stable and acceptable for discharge. The patient's chronic medical conditions were treated accordingly per the patient's home medication regimen. The patient's medication reconciliation (with changes made to chronic medications), follow up appointments, discharge orders, instructions, and significant lab and diagnostic studies are as noted.    Patient will be discharged to home with close OB, PCP, ID follow up.

## 2024-04-03 NOTE — PROGRESS NOTE ADULT - PROBLEM SELECTOR PLAN 3
DVT: hold chemical DVT prophylaxis in setting of hematemesis ; SCD   Diet: NPO   Dispo: pending medical improvement DVT: hold chemical DVT prophylaxis in setting of hematemesis ; SCD   Diet: regular  Dispo: home - Likely 2/2 NS IVF  - Patient asymptomatic    Plan:  - change supportive fluids from NS to LR  - Monitor BMP - Likely 2/2 NS IVF  - Patient asymptomatic    Plan:  - changed supportive fluids from NS to LR  - Monitor BMP

## 2024-04-03 NOTE — DISCHARGE NOTE PROVIDER - NSDCFUADDAPPT_GEN_ALL_CORE_FT
APPTS ARE READY TO BE MADE: [x ] YES    Best Family or Patient Contact (if needed):    Additional Information about above appointments (if needed):    1: Gastroenterology  2: OBGYN  3: PCP    Other comments or requests:    APPTS ARE READY TO BE MADE: [x ] YES    Best Family or Patient Contact (if needed):    Additional Information about above appointments (if needed):    1: Gastroenterology  2: OBGYN  3: PCP    Other comments or requests:   Prior to outreaching the patient, it was visible that the patient has secured a follow up appointment which was not scheduled by our team. 04/17 3:00pm with Dr. Nesbitt    Appointment was scheduled in [facility]. 04/15 1:15pm with Dr. Smith 05/06 9:30am with Dr. Hobson, a task was sent for a sooner appointment.     Appointment was scheduled but is not visible on [facility]. 04/11 11:30am with Dr. Peralta

## 2024-04-03 NOTE — DISCHARGE NOTE PROVIDER - NSRESEARCHGRANT_MLMHIDDEN_GEN_A_CORE
S: Patient admitted to ED observation for abdominal pain CT showing acute diverticulitis without reports of abscess perforation.  Patient started on Zosyn here in the ER feeling better is able to eat stools have been okay she has had chronic diarrhea.  No reports of bleeding etc.  Patient wants to go home today has to take care of her mother and also patient wants to go back to work tomorrow.  O:/57 (BP Location: Right arm)   Pulse 70   Temp 98.8  F (37.1  C) (Oral)   Resp 18   Ht 1.524 m (5')   Wt 97.5 kg (215 lb)   LMP 06/22/2016 (Exact Date)   SpO2 97%   BMI 41.99 kg/m    General patient is abdomen is soft with minimal tenderness on left side without rebound guarding or mass noted.  No rigidity noted.  Results for orders placed or performed during the hospital encounter of 10/20/21   CT Abdomen Pelvis w Contrast     Status: Abnormal   Result Value Ref Range    Radiologist flags Sigmoid diverticulitis (Urgent)     Narrative    Examination:  CT ABDOMEN PELVIS W CONTRAST 10/20/2021 10:57 AM     History: Evaluate for diverticulitis.    Comparison: MR pelvis 10/11/2020, CT cap 9/29/2019.    Technique: CT of the abdomen and pelvis were obtained with contrast.  Sagittal and coronal reconstructions created and reviewed.    Findings:     Abdomen and pelvis: Hepatic parenchyma is mildly diffusely  hypoattenuating. No focal hepatic lesions gallbladder surgically  absent. Mild intrahepatic biliary dilatation. Spleen, pancreas, and  adrenal glands are unremarkable. Kidneys demonstrate symmetric  enhancement without solid lesions, hydronephrosis, or nephrolithiasis.  Ureters and urinary bladder are unremarkable.    Stomach and small intestine are unremarkable. Acute diverticulitis  involving the proximal to mid sigmoid colon. No evidence of  perforation. No pericolonic abscess. Appendix is visualized and normal  in caliber.     No suspicious abdominal or pelvic lymphadenopathy. Small amount of  free fluid in the  pelvis, reactive. No pneumoperitoneum.    Abdominal aorta is normal in caliber and patent. Celiac and mesenteric  artery origins are unremarkable. Portal veins and IVC are patent    Lower thorax: Unremarkable.    Bones and soft tissues: No acute or suspicious osseous abnormality.      Impression    Impression:   1. Sigmoid diverticulitis without evidence of perforation or  pericolonic abscess.  2. Mild hepatic steatosis.     [Urgent Result: Sigmoid diverticulitis]    Finding was identified on 10/20/2021 11:07 AM.     Dr. Mg was contacted by Dr. Andres at 10/20/2021 11:16 AM and  verbalized understanding of the urgent finding.     I have personally reviewed the examination and initial interpretation  and I agree with the findings.    ANTWON VALADEZ MD         SYSTEM ID:  [id number]   Comprehensive metabolic panel     Status: Abnormal   Result Value Ref Range    Sodium 141 133 - 144 mmol/L    Potassium 4.3 3.4 - 5.3 mmol/L    Chloride 109 94 - 109 mmol/L    Carbon Dioxide (CO2) 24 20 - 32 mmol/L    Anion Gap 8 3 - 14 mmol/L    Urea Nitrogen 16 7 - 30 mg/dL    Creatinine 0.80 0.52 - 1.04 mg/dL    Calcium 9.2 8.5 - 10.1 mg/dL    Glucose 81 70 - 99 mg/dL    Alkaline Phosphatase 106 40 - 150 U/L    AST 21 0 - 45 U/L    ALT 18 0 - 50 U/L    Protein Total 6.6 (L) 6.8 - 8.8 g/dL    Albumin 3.1 (L) 3.4 - 5.0 g/dL    Bilirubin Total 0.5 0.2 - 1.3 mg/dL    GFR Estimate 87 >60 mL/min/1.73m2   CBC with platelets and differential     Status: Abnormal   Result Value Ref Range    WBC Count 18.5 (H) 4.0 - 11.0 10e3/uL    RBC Count 5.41 (H) 3.80 - 5.20 10e6/uL    Hemoglobin 12.8 11.7 - 15.7 g/dL    Hematocrit 40.3 35.0 - 47.0 %    MCV 75 (L) 78 - 100 fL    MCH 23.7 (L) 26.5 - 33.0 pg    MCHC 31.8 31.5 - 36.5 g/dL    RDW 17.5 (H) 10.0 - 15.0 %    Platelet Count 335 150 - 450 10e3/uL    % Neutrophils 66 %    % Lymphocytes 26 %    % Monocytes 6 %    % Eosinophils 1 %    % Basophils 0 %    % Immature Granulocytes 1 %    NRBCs per 100  WBC 0 <1 /100    Absolute Neutrophils 12.1 (H) 1.6 - 8.3 10e3/uL    Absolute Lymphocytes 4.8 0.8 - 5.3 10e3/uL    Absolute Monocytes 1.2 0.0 - 1.3 10e3/uL    Absolute Eosinophils 0.2 0.0 - 0.7 10e3/uL    Absolute Basophils 0.1 0.0 - 0.2 10e3/uL    Absolute Immature Granulocytes 0.2 (H) <=0.0 10e3/uL    Absolute NRBCs 0.0 10e3/uL   Lipase     Status: Abnormal   Result Value Ref Range    Lipase 66 (L) 73 - 393 U/L   UA with Microscopic reflex to Culture     Status: Abnormal    Specimen: Urine, Midstream   Result Value Ref Range    Color Urine Light Yellow Colorless, Straw, Light Yellow, Yellow    Appearance Urine Slightly Cloudy (A) Clear    Glucose Urine Negative Negative mg/dL    Bilirubin Urine Negative Negative    Ketones Urine Negative Negative mg/dL    Specific Gravity Urine 1.021 1.003 - 1.035    Blood Urine Negative Negative    pH Urine 7.5 (H) 5.0 - 7.0    Protein Albumin Urine Negative Negative mg/dL    Urobilinogen Urine Normal Normal, 2.0 mg/dL    Nitrite Urine Negative Negative    Leukocyte Esterase Urine Negative Negative    Amorphous Crystals Urine Few (A) None Seen /HPF    RBC Urine 0 <=2 /HPF    WBC Urine 0 <=5 /HPF    Squamous Epithelials Urine 3 (H) <=1 /HPF    Narrative    Urine Culture not indicated   Asymptomatic COVID-19 Virus (Coronavirus) by PCR Nose     Status: Normal    Specimen: Nose; Swab   Result Value Ref Range    SARS CoV2 PCR Negative Negative    Narrative    Testing was performed using the Xpert Xpress SARS-CoV-2 Assay on the  Cepheid Gene-Xpert Instrument Systems. Additional information about  this Emergency Use Authorization (EUA) assay can be found via the Lab  Guide. This test should be ordered for the detection of SARS-CoV-2 in  individuals who meet SARS-CoV-2 clinical and/or epidemiological  criteria. Test performance is unknown in asymptomatic patients. This  test is for in vitro diagnostic use under the FDA EUA for  laboratories certified under CLIA to perform high complexity  testing.  This test has not been FDA cleared or approved. A negative result  does not rule out the presence of PCR inhibitors in the specimen or  target RNA in concentration below the limit of detection for the  assay. The possibility of a false negative should be considered if  the patient's recent exposure or clinical presentation suggests  COVID-19. This test was validated by the [facility]. This laboratory is certified under  the Clinical Laboratory Improvement Amendments of 1988 (CLIA-88) as  qualified to perform high complexity laboratory testing.     Comprehensive metabolic panel     Status: Abnormal   Result Value Ref Range    Sodium 138 133 - 144 mmol/L    Potassium 3.9 3.4 - 5.3 mmol/L    Chloride 109 94 - 109 mmol/L    Carbon Dioxide (CO2) 24 20 - 32 mmol/L    Anion Gap 5 3 - 14 mmol/L    Urea Nitrogen 12 7 - 30 mg/dL    Creatinine 0.92 0.52 - 1.04 mg/dL    Calcium 8.3 (L) 8.5 - 10.1 mg/dL    Glucose 145 (H) 70 - 99 mg/dL    Alkaline Phosphatase 101 40 - 150 U/L    AST 16 0 - 45 U/L    ALT 24 0 - 50 U/L    Protein Total 6.5 (L) 6.8 - 8.8 g/dL    Albumin 2.7 (L) 3.4 - 5.0 g/dL    Bilirubin Total 0.6 0.2 - 1.3 mg/dL    GFR Estimate 73 >60 mL/min/1.73m2   Lactic Acid STAT     Status: Normal   Result Value Ref Range    Lactic Acid 1.3 0.7 - 2.0 mmol/L   CBC with platelets and differential     Status: Abnormal   Result Value Ref Range    WBC Count 13.2 (H) 4.0 - 11.0 10e3/uL    RBC Count 4.93 3.80 - 5.20 10e6/uL    Hemoglobin 11.4 (L) 11.7 - 15.7 g/dL    Hematocrit 35.8 35.0 - 47.0 %    MCV 73 (L) 78 - 100 fL    MCH 23.1 (L) 26.5 - 33.0 pg    MCHC 31.8 31.5 - 36.5 g/dL    RDW 16.7 (H) 10.0 - 15.0 %    Platelet Count 315 150 - 450 10e3/uL    % Neutrophils 70 %    % Lymphocytes 22 %    % Monocytes 6 %    % Eosinophils 1 %    % Basophils 0 %    % Immature Granulocytes 1 %    NRBCs per 100 WBC 0 <1 /100    Absolute Neutrophils 9.3 (H) 1.6 - 8.3 10e3/uL    Absolute  Lymphocytes 2.9 0.8 - 5.3 10e3/uL    Absolute Monocytes 0.7 0.0 - 1.3 10e3/uL    Absolute Eosinophils 0.1 0.0 - 0.7 10e3/uL    Absolute Basophils 0.0 0.0 - 0.2 10e3/uL    Absolute Immature Granulocytes 0.1 (H) <=0.0 10e3/uL    Absolute NRBCs 0.0 10e3/uL   Extra Blue Top Tube     Status: None   Result Value Ref Range    Hold Specimen [facility]    Extra Red Top Tube     Status: None   Result Value Ref Range    Hold Specimen [facility]    Comprehensive metabolic panel     Status: Abnormal   Result Value Ref Range    Sodium 141 133 - 144 mmol/L    Potassium 4.1 3.4 - 5.3 mmol/L    Chloride 112 (H) 94 - 109 mmol/L    Carbon Dioxide (CO2) 24 20 - 32 mmol/L    Anion Gap 5 3 - 14 mmol/L    Urea Nitrogen 10 7 - 30 mg/dL    Creatinine 0.90 0.52 - 1.04 mg/dL    Calcium 8.5 8.5 - 10.1 mg/dL    Glucose 133 (H) 70 - 99 mg/dL    Alkaline Phosphatase 94 40 - 150 U/L    AST 6 0 - 45 U/L    ALT 20 0 - 50 U/L    Protein Total 6.3 (L) 6.8 - 8.8 g/dL    Albumin 2.7 (L) 3.4 - 5.0 g/dL    Bilirubin Total 0.5 0.2 - 1.3 mg/dL    GFR Estimate 75 >60 mL/min/1.73m2   CRP inflammation     Status: Abnormal   Result Value Ref Range    CRP Inflammation 110.0 (H) 0.0 - 8.0 mg/L   Erythrocyte sedimentation rate auto     Status: Abnormal   Result Value Ref Range    Erythrocyte Sedimentation Rate 31 (H) 0 - 20 mm/hr   Magnesium     Status: Normal   Result Value Ref Range    Magnesium 2.3 1.6 - 2.3 mg/dL   Phosphorus     Status: Normal   Result Value Ref Range    Phosphorus 3.3 2.5 - 4.5 mg/dL   CBC with platelets and differential     Status: Abnormal   Result Value Ref Range    WBC Count 12.0 (H) 4.0 - 11.0 10e3/uL    RBC Count 4.78 3.80 - 5.20 10e6/uL    Hemoglobin 11.0 (L) 11.7 - 15.7 g/dL    Hematocrit 35.2 35.0 - 47.0 %    MCV 74 (L) 78 - 100 fL    MCH 23.0 (L) 26.5 - 33.0 pg    MCHC 31.3 (L) 31.5 - 36.5 g/dL    RDW 16.8 (H) 10.0 - 15.0 %    Platelet Count 313 150 - 450 10e3/uL    % Neutrophils 74 %    % Lymphocytes 19 %    % Monocytes 6 %    % Eosinophils 0 %     % Basophils 0 %    % Immature Granulocytes 1 %    NRBCs per 100 WBC 0 <1 /100    Absolute Neutrophils 8.8 (H) 1.6 - 8.3 10e3/uL    Absolute Lymphocytes 2.3 0.8 - 5.3 10e3/uL    Absolute Monocytes 0.8 0.0 - 1.3 10e3/uL    Absolute Eosinophils 0.0 0.0 - 0.7 10e3/uL    Absolute Basophils 0.0 0.0 - 0.2 10e3/uL    Absolute Immature Granulocytes 0.1 (H) <=0.0 10e3/uL    Absolute NRBCs 0.0 10e3/uL   Smoking Cessation Program IP Consult     Status: None ()    Narrative    Amanda Lee, RT     10/21/2021  2:31 PM  Nicotine Cessation Consult   10/21/2021    Patient: Jesise Seymour      :  1970                      MRN:[id number]          History of Present Illness: Jessie Seymour is a 50 year old   female with a past medical history significant for diverticulitis   of colon, hiatal hernia, RBC microcytosis 2/2 alpha thalassemia,   moderate aortic regurgitation, hyperlipidemia, asthma, and s/p   hysterectomy (2016) who presents to the Emergency Department for   evaluation of sharp, crampy lower abdominal pain radiating around   to her lower back.    Reason for Consult: Patient is a current every day smoker    Patient open to sharing about her tobacco use but declines NRT   during hospitalization.       Types of Tobacco and Amount   Cigarettes 4-10/day   E-Cigs    Smokeless Tobacco    Cigars    Pipes    Waterpipes      Patients last cigarette/vape/e-cig/smokeless tobacco:    10/20/21       F[place]strom Test for Nicotine Dependence   How soon after waking do you smoke your first cigarette Within 5   minutes=3  5-30 minutes=2  31-60 minute=1  >61 minutes=0 3             Do you find it difficult to refrain from smoking in places where   it is forbidden? e.g. [employer]ism, restaurants, etc? Yes=1  No=0   1        Which cigarette would you hate to give up? The first in the   morning=1  Any other=0 1        How many cigarettes a day do you smoke? 10 or less=0  11-20=1  21-30=2  31 or more=3 0   Do you smoke more  frequently in the morning?   Yes=1  No=0 1   Do you smoke even if you are sick in bed most of the day? Yes=1  No=0 0                                                                                                                                         Total Score 6   SCORE 1-2 = Low Dependence   3-4 = Low to Mod Dependence    5-7 = Moderate Dependence      8+ = High Dependence     [place] Tobacco Withdrawal Scale   DSM-5 Symptoms 0 = none, 1 = slight, 2 = mild, 3 = moderate, 4 =   severe   Desire or craving to smoke 2   Anxious, nervous 2   Depressed mood, sad 0   Difficulty concentrating 0   Increased appetite, hungry, weight gain 0   Insomnia, sleep problems, awakening at night 0   Restless 0   Impatient  2   Total Score 6   Score:  1-8=Slight          8-16=Mild           16-24=moderate       24+=Severe     Stage of Behavior Change:   Pre-contemplation - No intention    Contemplation - Change on the horizon X   Preparation - Getting Ready    Action - Consistently changed (within 6 months)    Maintenance - Staying quit (more than 6 months)    Relapse - Recycling      Patients Motivation to Quit Scale    Importance (1-10): 10   Readiness (1-10) 10   Confidence  (1-10): 4   Can Patient Imagine a Future without Smoking: maybe   Quit Attempt Date:     Final Quit Date:  TBD     Patients main reason(s) for smoking include: Social, physical,   emotional, other. . She attributes loss of  2 years ago in   her presence and inability to revive him as on-going reason she   smokes    Top Reason(s) to quit smoking: Her son     Quit Attempts: Many,    Triggers: Stress is the biggest factor    Assessment Using Motivational Interviewing:     MI Intervention: Expressed Empathy/Understanding, Supported   Autonomy, Collaboration, Evocation, Permission to raise concern   or advise, Open-ended questions, Reflections: simple and complex,   Change talk (evoked) and Reframe    Nicotine Dependence Score: 6  Withdrawal Score:  "6    Assessment:   Patient not interested in discharging with NRT, stated patches   make her heart rate go up. She was unclear of the strength of   patch she tried. Asked if she experienced similar symptoms with   smoking more than 10 cigarettes and she said yes.   -Patient aware she needs to quit, would like to quit but this   writer doesn't believe patient is ready at this time.    Education:    -Provided education on the safety of NRT, effects of stabilizing   the brain to allow for behavior modification and increasing   chances of quitting.   -Provided educational workbook, \"Quitting for Good with Treatment   and Support.\"   -Discussed health risks of continued smoking.   -Provided motivation and encouragement.   -Shared that it's never too late to quit and that the benefits   are immediate and profoundly impactful.   -Shared that slipping up here and there doesn't necessarily mean   she failed; rather, it's an opportunity to reflect and modify her   behaviors and habits and to employ alternate strategies to deal   with strong triggers.    Recommendations:   -Encouraged patient to use workbook to help her understand why   she smokes, come up with 5 reasons to quit, and to imagine what   her future looks like without smoking.   -Encouraged her to develop strategies to distract herself to get   past cravings.   -Agrees to participate in our post-hosp smoking cessation   follow-up calls for treatment and support, starting at 48 hrs   post discharge, then at 14 days, 1 month, and at 6 months.     Time Spent: I spent 45 minutes with patient. Will notify provider   of recommendations.    Gave contact information and will call 48 hours after discharge   to provide support.    Amanda Lee, RRT, CTTS  Chronic Pulmonary Disease Specialist  Office: [phone]  Pager: [phone]  Hours: M-F 8-4:30     CBC with platelets differential     Status: Abnormal    Narrative    The following orders were created for panel " order CBC with platelets differential.  Procedure                               Abnormality         Status                     ---------                               -----------         ------                     CBC with platelets and d...[[id number]]  Abnormal            Final result                 Please view results for these tests on the individual orders.   CBC with Platelets & Differential     Status: Abnormal    Narrative    The following orders were created for panel order CBC with Platelets & Differential.  Procedure                               Abnormality         Status                     ---------                               -----------         ------                     CBC with platelets and d...[[id number]]  Abnormal            Final result                 Please view results for these tests on the individual orders.   Extra Tube     Status: None    Narrative    The following orders were created for panel order Extra Tube.  Procedure                               Abnormality         Status                     ---------                               -----------         ------                     Extra Blue Top Tube[[id number]]                              Final result               Extra Red Top Tube[[id number]]                               Final result                 Please view results for these tests on the individual orders.   CBC with Platelets & Differential     Status: Abnormal    Narrative    The following orders were created for panel order CBC with Platelets & Differential.  Procedure                               Abnormality         Status                     ---------                               -----------         ------                     CBC with platelets and d...[[id number]]  Abnormal            Final result                 Please view results for these tests on the individual orders.       A: Abdominal pain with sigmoid diverticulitis without complications.  P: Continue IV  antibiotics will then transition to oral Augmentin home with follow-up closely in indications for returning.  Will make sure patient is up ambulating able to take care of herself before she is discharged.     yes

## 2024-04-03 NOTE — DISCHARGE NOTE PROVIDER - PROVIDER TOKENS
PROVIDER:[TOKEN:[[id number]],FOLLOWUP:[2 weeks],ESTABLISHEDPATIENT:[T]] PROVIDER:[TOKEN:[[id number]],FOLLOWUP:[2 weeks],ESTABLISHEDPATIENT:[T]],PROVIDER:[TOKEN:[[id number]],FOLLOWUP:[1 week]]

## 2024-04-03 NOTE — PROGRESS NOTE ADULT - PROBLEM SELECTOR PLAN 1
- UA positive for leuk esterase, WBC , new from last UA   - PMHx of ESBL UTI (Feb 2024)  - s/p 1x Ertapenam in ED  - CVA tenderness on exam    Plan:  - f/u UCx, BCx  - c/w Ertapenem  - f/u Renal US to evaluate for pyelonephritis   - ID consult i/s/o recurrent UTI and prior ESBL - UA positive for leukocyte esterase, WBC , new from last UA   - PMHx of ESBL UTI (Feb 2024)  - s/p 1x Ertapenam in ED  - CVA tenderness on exam    Plan:  - f/u UCx, BCx  - c/w Ertapenem  - f/u Renal US to evaluate for pyelonephritis   - ID consulted, appreciate recs - UA positive for leukocyte esterase, WBC , new from last UA   - PMHx of ESBL UTI (Feb 2024)  - s/p 1x Ertapenam in ED  - CVA tenderness on exam    Plan:  - UCx negative  - d/c Ertapenem  - Renal US unremarkable  - ID consulted, appreciate recs

## 2024-04-03 NOTE — PROGRESS NOTE ADULT - PROBLEM SELECTOR PLAN 2
- Nan Dorman vs Gastritis  - CXR unremarkable  - Hgb 11.7--> 11     Plan:  - GI consulted, appreciate recs:   >No acute intervention  - NPO for now   - c/w IV protonix   - CBC q12h; active type and screen ; maintain 2 large bore IV   - OBGYN following, appreciate recs:  > Anti-emetic regimen per OBGYN recs put in  > Daily FH check    - c/w Protonix - Nan Dorman vs Gastritis  - CXR unremarkable  - Hgb 11.7--> 11     Plan:  - GI consulted, appreciate recs:   >No acute intervention  - c/w IV protonix   - CBC q12h; active type and screen ; maintain 2 large bore IV   - OBGYN following, appreciate recs:  > Anti-emetic regimen per OBGYN recs put in  - c/w supportive IV fluids    - c/w Protonix

## 2024-04-03 NOTE — DISCHARGE NOTE PROVIDER - NSFOLLOWUPCLINICS_GEN_ALL_ED_FT
Gastroenterology at [facility]  Gastroenterology  [address] [place] [address]  Phone: [phone]  Fax:   Follow Up Time: 2 weeks    [facility] Gynecology and Obstetrics  Gynceology/OB  [address] [place] [address]  Phone: [phone]  Fax:   Follow Up Time: 1 week

## 2024-04-03 NOTE — DISCHARGE NOTE PROVIDER - NSDCMRMEDTOKEN_GEN_ALL_CORE_FT
folic acid 1 mg oral tablet: 1 tab(s) orally once a day  Prenatal Multivitamins with Folic Acid 1 mg oral tablet: 1 tab(s) orally once a day   metoclopramide 5 mg oral tablet: 1 tab(s) orally every 8 hours as needed for nausea/vomiting  ondansetron 4 mg oral tablet: 1 tab(s) orally every 6 hours as needed for Nausea and/or Vomiting  Prenatal Multivitamins with Folic Acid 1 mg oral tablet: 1 tab(s) orally once a day  pyridoxine 25 mg oral tablet: 1 tab(s) orally every 24 hours   Prenatal Multivitamins with Folic Acid 1 mg oral tablet: 1 tab(s) orally once a day  pyridoxine 25 mg oral tablet: 1 tab(s) orally every 24 hours

## 2024-04-04 ENCOUNTER — TRANSCRIPTION ENCOUNTER (OUTPATIENT)
Age: 34
End: 2024-04-04

## 2024-04-04 VITALS
OXYGEN SATURATION: 99 % | TEMPERATURE: 99 F | RESPIRATION RATE: 18 BRPM | HEART RATE: 78 BPM | DIASTOLIC BLOOD PRESSURE: 52 MMHG | SYSTOLIC BLOOD PRESSURE: 96 MMHG

## 2024-04-04 LAB
ANION GAP SERPL CALC-SCNC: 12 MMOL/L — SIGNIFICANT CHANGE UP (ref 5–17)
BUN SERPL-MCNC: 6 MG/DL — LOW (ref 7–23)
CALCIUM SERPL-MCNC: 8.9 MG/DL — SIGNIFICANT CHANGE UP (ref 8.4–10.5)
CHLORIDE SERPL-SCNC: 102 MMOL/L — SIGNIFICANT CHANGE UP (ref 96–108)
CO2 SERPL-SCNC: 22 MMOL/L — SIGNIFICANT CHANGE UP (ref 22–31)
CREAT SERPL-MCNC: 0.59 MG/DL — SIGNIFICANT CHANGE UP (ref 0.5–1.3)
EGFR: 123 ML/MIN/1.73M2 — SIGNIFICANT CHANGE UP
GLUCOSE SERPL-MCNC: 74 MG/DL — SIGNIFICANT CHANGE UP (ref 70–99)
HCT VFR BLD CALC: 34.9 % — SIGNIFICANT CHANGE UP (ref 34.5–45)
HGB BLD-MCNC: 11.8 G/DL — SIGNIFICANT CHANGE UP (ref 11.5–15.5)
MAGNESIUM SERPL-MCNC: 2 MG/DL — SIGNIFICANT CHANGE UP (ref 1.6–2.6)
MCHC RBC-ENTMCNC: 29.5 PG — SIGNIFICANT CHANGE UP (ref 27–34)
MCHC RBC-ENTMCNC: 33.8 GM/DL — SIGNIFICANT CHANGE UP (ref 32–36)
MCV RBC AUTO: 87.3 FL — SIGNIFICANT CHANGE UP (ref 80–100)
NRBC # BLD: 0 /100 WBCS — SIGNIFICANT CHANGE UP (ref 0–0)
PHOSPHATE SERPL-MCNC: 3.7 MG/DL — SIGNIFICANT CHANGE UP (ref 2.5–4.5)
PLATELET # BLD AUTO: 232 K/UL — SIGNIFICANT CHANGE UP (ref 150–400)
POTASSIUM SERPL-MCNC: 3.5 MMOL/L — SIGNIFICANT CHANGE UP (ref 3.5–5.3)
POTASSIUM SERPL-SCNC: 3.5 MMOL/L — SIGNIFICANT CHANGE UP (ref 3.5–5.3)
RBC # BLD: 4 M/UL — SIGNIFICANT CHANGE UP (ref 3.8–5.2)
RBC # FLD: 12.2 % — SIGNIFICANT CHANGE UP (ref 10.3–14.5)
SODIUM SERPL-SCNC: 136 MMOL/L — SIGNIFICANT CHANGE UP (ref 135–145)
WBC # BLD: 7.65 K/UL — SIGNIFICANT CHANGE UP (ref 3.8–10.5)
WBC # FLD AUTO: 7.65 K/UL — SIGNIFICANT CHANGE UP (ref 3.8–10.5)

## 2024-04-04 PROCEDURE — 83690 ASSAY OF LIPASE: CPT

## 2024-04-04 PROCEDURE — 85014 HEMATOCRIT: CPT

## 2024-04-04 PROCEDURE — 80053 COMPREHEN METABOLIC PANEL: CPT

## 2024-04-04 PROCEDURE — 76770 US EXAM ABDO BACK WALL COMP: CPT

## 2024-04-04 PROCEDURE — 82803 BLOOD GASES ANY COMBINATION: CPT

## 2024-04-04 PROCEDURE — 96374 THER/PROPH/DIAG INJ IV PUSH: CPT

## 2024-04-04 PROCEDURE — 85018 HEMOGLOBIN: CPT

## 2024-04-04 PROCEDURE — 87086 URINE CULTURE/COLONY COUNT: CPT

## 2024-04-04 PROCEDURE — 81001 URINALYSIS AUTO W/SCOPE: CPT

## 2024-04-04 PROCEDURE — 87640 STAPH A DNA AMP PROBE: CPT

## 2024-04-04 PROCEDURE — 84295 ASSAY OF SERUM SODIUM: CPT

## 2024-04-04 PROCEDURE — 82330 ASSAY OF CALCIUM: CPT

## 2024-04-04 PROCEDURE — 82947 ASSAY GLUCOSE BLOOD QUANT: CPT

## 2024-04-04 PROCEDURE — 87015 SPECIMEN INFECT AGNT CONCNTJ: CPT

## 2024-04-04 PROCEDURE — 80048 BASIC METABOLIC PNL TOTAL CA: CPT

## 2024-04-04 PROCEDURE — 36415 COLL VENOUS BLD VENIPUNCTURE: CPT

## 2024-04-04 PROCEDURE — 87116 MYCOBACTERIA CULTURE: CPT

## 2024-04-04 PROCEDURE — 87800 DETECT AGNT MULT DNA DIREC: CPT

## 2024-04-04 PROCEDURE — 99239 HOSP IP/OBS DSCHRG MGMT >30: CPT

## 2024-04-04 PROCEDURE — 93975 VASCULAR STUDY: CPT

## 2024-04-04 PROCEDURE — 84484 ASSAY OF TROPONIN QUANT: CPT

## 2024-04-04 PROCEDURE — 87591 N.GONORRHOEAE DNA AMP PROB: CPT

## 2024-04-04 PROCEDURE — 85027 COMPLETE CBC AUTOMATED: CPT

## 2024-04-04 PROCEDURE — 83605 ASSAY OF LACTIC ACID: CPT

## 2024-04-04 PROCEDURE — 96375 TX/PRO/DX INJ NEW DRUG ADDON: CPT

## 2024-04-04 PROCEDURE — 99232 SBSQ HOSP IP/OBS MODERATE 35: CPT

## 2024-04-04 PROCEDURE — 87206 SMEAR FLUORESCENT/ACID STAI: CPT

## 2024-04-04 PROCEDURE — 84132 ASSAY OF SERUM POTASSIUM: CPT

## 2024-04-04 PROCEDURE — 85025 COMPLETE CBC W/AUTO DIFF WBC: CPT

## 2024-04-04 PROCEDURE — 87040 BLOOD CULTURE FOR BACTERIA: CPT

## 2024-04-04 PROCEDURE — 84100 ASSAY OF PHOSPHORUS: CPT

## 2024-04-04 PROCEDURE — 76810 OB US >/= 14 WKS ADDL FETUS: CPT

## 2024-04-04 PROCEDURE — 82435 ASSAY OF BLOOD CHLORIDE: CPT

## 2024-04-04 PROCEDURE — 99285 EMERGENCY DEPT VISIT HI MDM: CPT

## 2024-04-04 PROCEDURE — 87641 MR-STAPH DNA AMP PROBE: CPT

## 2024-04-04 PROCEDURE — 83735 ASSAY OF MAGNESIUM: CPT

## 2024-04-04 PROCEDURE — 71045 X-RAY EXAM CHEST 1 VIEW: CPT

## 2024-04-04 RX ADMIN — PANTOPRAZOLE SODIUM 40 MILLIGRAM(S): 20 TABLET, DELAYED RELEASE ORAL at 07:49

## 2024-04-04 NOTE — PROGRESS NOTE ADULT - ATTENDING COMMENTS
33F  (14wks pregnant) w/ PMHx of ESBL UTI (2024) p/w Hematemesis, dysuria.   # Hematemesis   # Hyperemesis gravidarum  - Hgb 11.7--> 11--> 11.8  - review BMP Mg Phos, no significant electrolyte derangement   - personally reviewed CXR clear lungs, no focal consolidation, no mediastinal enlargement  - personally reviewed ECG, normal sinus rhythm, no acute ischemia ; troponin < 6  - s/p GI eval, current no plan for endoscope eval , suspect esophagitis  - s/p OB mckenna, recommend         - LR with additives - Thiamine 100mg, Folic acid 1mg, 10 ml multivitamin, 2g Mg sulfate, 20mg Pyridoxine - infuse x8 hrs          - Zofran 4mg q6-8 hrs         - Reglan 5-10mg q 6-8 hrs        - Phenergan 12.5 suppository          - Pyridoxine 20mg q24 hrs         - Benadryl 10-50mg q4-6 hrs  - s/p IV protonix   - nausea vomiting markedly improved, tolerating regular diet well       # UTI  - UA + leuk estrase, WBC   - UCx normal juan jose   - BCx NGTD  - UCx negative acid bacilli by fluorochrome stain   - reviewed renal US no hydronephrosis, no pyelonephritis   - Vaginal AFFIRM ( Trichomonas, Gardnerella, candida) negative; cervical GC negative   - s/p ertapnem x 2 days   - s/p ID eval, considering low suspicion for UTI, recommend discontinue antibiotics    -  discussed with Dr. Hobson (ID), OK for discharge per ID perspective, will follow the urine AFB cx and mycoplasma/ureaplasma as outpatient    # Intrauterine pregnancy  - reviewed OB ultrasound, 14 week viable intrauterine pregnancy   - OB following, recs appreciated --- discuss with OB team, agreeable with discharge   - avoid medications that are teratogenic in pregnancy.     languag[facility]olution IPAD [facility] [id number]. Personally reviewed hospital course, all test results, importance of OB, ID , PCP followup, anticipatory guidelines with patient, and all questions answered.   D/w resident team .  Discharge: spend 30 minutes preparing for discharge, including discussion with patient, ACP, consultants, medication review, coordinating followup.
33F  (14wks pregnant) w/ PMHx of ESBL UTI (2024) p/w Hematemesis, dysuria.   # Hematemesis   # Hyperemesis gravidarum  - Hgb 11.7--> 11, hemodynamically stable  - review BMP Mg Phos, no significant electrolyte derangement   - personally reviewed CXR clear lungs, no focal consolidation, no mediastinal enlargement  - personally reviewed ECG, normal sinus rhythm, no acute ischemia ; troponin < 6  - s/p GI eval, current no plan for endoscope eval , suspect esophagitis  - s/p OB mckenna, recommend         - LR with additives - Thiamine 100mg, Folic acid 1mg, 10 ml multivitamin, 2g Mg sulfate, 20mg Pyridoxine - infuse x8 hrs          - Zofran 4mg q6-8 hrs         - Reglan 5-10mg q 6-8 hrs        - Phenergan 12.5 suppository          - Pyridoxine 20mg q24 hrs         - Benadryl 10-50mg q4-6 hrs  - c/w IV protonix   - nausea/vomiting resolved, tolerating regular diet     # UTI  - UA + leuk estrase, WBC   - UCx normal juan jose   - BCx NGTD  - reviewed renal US no hydronephrosis, no pyelonephritis   - Vaginal AFFIRM ( Trichomonas, Gardnerella, candida negative; cervical GC negative   - s/p ertapnem x 2 days   - s/p ID eval, considering low suspicion for UTI, recommend discontinue antibiotics      # Intrauterine pregnancy  - reviewed OB ultrasound, 14 week viable intrauterine pregnancy   - OB following, recs appreciated --- discuss with OB team, consider potential discharge tomorrow   - avoid medications that are teratogenic in pregnancy.    D/w resident team

## 2024-04-04 NOTE — PROGRESS NOTE ADULT - ASSESSMENT
33F  (14wks pregnant) w/ PMHx of ESBL UTI (2024) p/w Hematemesis, dysuria found to have recurrent UTI on UA. Hematemesis likely 2/2 Nan Dorman tear i/s/o daily persistent emesis.
33F  (14wks pregnant), was hospitalized 2024 after she tested positive for pregnancy with some lower abd cramping and vaginal bleeding but also stated she had some dysuria earlier and urine cx showed ESBL and she was given keflex but her urine cx on admission was negative, she was treated with a 5 day course of ertapenem anyway, she has vomiting afterwards due to hyperemesis gravidarum but now had some blood in the vomitus, she also c/o dysuria, lower abd pain, flank pain, chills  here afebrile WBC normal  u/a with pyuria but contaminated (had epithelial cells) cultures negative  GC negative  was seen by GI and was considered esophagitis due to vomiting    14 w pregnant with hyperemesis gravidarum and persistent vomiting now with some hematemesis which was considered esophagitis  had ESBL in the urine when she was diagnosed with pregnancy and received 5 days of erta, now again c/o dysuria and lower abd pain, but urine cx negative (u/a contaminated with epithelial cells)  pt has occasional vaginal bleeding and symptoms more likely vaginal    * blood and urine cx negative, so discontinued erta (received 2 days)  * will follow the urine AFB cx and mycoplasma/ureaplasma as outpatient      The above assessment and plan was discussed with the primary team    Mechelle Hobson MD  contact on teams  After 5pm and on weekends call [phone]    
33F  (14wks pregnant), was hospitalized 2024 after she tested positive for pregnancy with some lower abd cramping and vaginal bleeding but also stated she had some dysuria earlier and urine cx showed ESBL and she was given keflex but her urine cx on admission was negative, she was treated with a 5 day course of ertapenem anyway, she has vomiting afterwards due to hyperemesis gravidarum but now had some blood in the vomitus, she also c/o dysuria, lower abd pain, flank pain, chills  here afebrile WBC normal  u/a with pyuria but contaminated (had epithelial cells) cultures negative  GC negative  was seen by GI and was considered esophagitis due to vomiting    14 w pregnant with hyperemesis gravidarum and persistent vomiting now with some hematemesis which was considered esophagitis  had ESBL in the urine when she was diagnosed with pregnancy and received 5 days of erta, now again c/o dysuria and lower abd pain, but urine cx negative (u/a contaminated with epithelial cells)  pt has occasional vaginal bleeding and symptoms more likely vaginal    * blood and urine cx negative, so will stop erta (received 2 days)  * urine AFB  * f/u with GI    The above assessment and plan was discussed with the primary team    Mechelle Hobson MD  contact on teams  After 5pm and on weekends call [phone]  
33F  (14wks pregnant) w/ PMHx of ESBL UTI (2024) p/w Hematemesis, dysuria found to have recurrent UTI on UA. Hematemesis likely 2/2 Nan Dorman tear i/s/o daily persistent emesis.

## 2024-04-04 NOTE — DISCHARGE NOTE NURSING/CASE MANAGEMENT/SOCIAL WORK - NSDCFUADDAPPT_GEN_ALL_CORE_FT
APPTS ARE READY TO BE MADE: [x ] YES    Best Family or Patient Contact (if needed):    Additional Information about above appointments (if needed):    1: Gastroenterology  2: OBGYN  3: PCP    Other comments or requests:

## 2024-04-04 NOTE — PROGRESS NOTE ADULT - PROBLEM SELECTOR PLAN 1
- UA positive for leukocyte esterase, WBC , new from last UA   - PMHx of ESBL UTI (Feb 2024)  - s/p 1x Ertapenam in ED  - CVA tenderness on exam    Plan:  - UCx negative  - d/c Ertapenem  - Renal US unremarkable  - ID consulted, appreciate recs - UA positive for leukocyte esterase, WBC , new from last UA   - PMHx of ESBL UTI (Feb 2024)  - s/p 1x Ertapenam in ED  - CVA tenderness on exam    Plan:  - UCx negative; BCx negative   - negative Urine acid fast bacili by fluorochrome stain   - d/c Ertapenem  - Renal US unremarkable  - ID consulted, appreciate recs

## 2024-04-04 NOTE — PROGRESS NOTE ADULT - SUBJECTIVE AND OBJECTIVE BOX
Follow Up:  UTI in pregnancy    Interval History/ROS: pt afebrile, no vomiting but still has nausea, improved dysuria        Allergies  No Known Allergies        ANTIMICROBIALS:      OTHER MEDS:  acetaminophen     Tablet .. 650 milliGRAM(s) Oral every 6 hours PRN  diphenhydrAMINE 25 milliGRAM(s) Oral every 6 hours PRN  influenza   Vaccine 0.5 milliLiter(s) IntraMuscular once  lactated ringers. 1000 milliLiter(s) IV Continuous <Continuous>  metoclopramide 5 milliGRAM(s) Oral every 8 hours PRN  ondansetron    Tablet 4 milliGRAM(s) Oral every 6 hours PRN  pantoprazole  Injectable 40 milliGRAM(s) IV Push two times a day  promethazine Suppository 12.5 milliGRAM(s) Rectal once  pyridoxine 25 milliGRAM(s) Oral every 24 hours      Vital Signs Last 24 Hrs  T(C): 36.9 (03 Apr 2024 11:30), Max: 37.5 (02 Apr 2024 18:40)  T(F): 98.5 (03 Apr 2024 11:30), Max: 99.5 (02 Apr 2024 18:40)  HR: 73 (03 Apr 2024 11:30) (66 - 73)  BP: 84/60 (03 Apr 2024 11:30) (84/60 - 97/62)  BP(mean): 69 (03 Apr 2024 11:30) (69 - 69)  RR: 18 (03 Apr 2024 11:30) (17 - 18)  SpO2: 98% (03 Apr 2024 11:30) (98% - 100%)    Parameters below as of 03 Apr 2024 11:30  Patient On (Oxygen Delivery Method): room air        Physical Exam:  General:    NAD,  non toxic  Respiratory:   comfortable on RA  abd:     soft,     no tenderness  :   no CVAT,  no suprapubic tenderness,   no  luther  Musculoskeletal:   no joint swelling  vascular: no phlebitis  Skin:    no rash                          11.1   6.99  )-----------( 221      ( 03 Apr 2024 07:25 )             32.8       04-03    135  |  104  |  7   ----------------------------<  77  3.9   |  15<L>  |  0.49<L>    Ca    8.6      03 Apr 2024 07:25  Phos  3.5     04-03  Mg     2.0     04-03    TPro  6.5  /  Alb  3.7  /  TBili  0.2  /  DBili  x   /  AST  17  /  ALT  27  /  AlkPhos  45  04-02      Urinalysis Basic - ( 03 Apr 2024 07:25 )    Color: x / Appearance: x / SG: x / pH: x  Gluc: 77 mg/dL / Ketone: x  / Bili: x / Urobili: x   Blood: x / Protein: x / Nitrite: x   Leuk Esterase: x / RBC: x / WBC x   Sq Epi: x / Non Sq Epi: x / Bacteria: x        MICROBIOLOGY:  v  .Blood Blood-Peripheral  04-02-24   No growth at 24 hours  --  --      Clean Catch Clean Catch (Midstream)  04-02-24   <10,000 CFU/mL Normal Urogenital Tasha  --  --                RADIOLOGY:  Images independently visualized and reviewed personally, findings as below  < from: US Kidney and Bladder (04.02.24 @ 13:19) >  IMPRESSION:  No hydronephrosis or perinephric collection.      < end of copied text >  < from: Xray Chest 1 View AP/PA (04.02.24 @ 05:09) >  IMPRESSION:  Clear lungs.    < end of copied text >  
  Follow Up:  UTI in pregnancy    Interval History/ROS: pt afebrile, no vomiting or cough, ertapenem was discontinued yesterday            Allergies  No Known Allergies        ANTIMICROBIALS:      OTHER MEDS:  acetaminophen     Tablet .. 650 milliGRAM(s) Oral every 6 hours PRN  chlorhexidine 4% Liquid 1 Application(s) Topical daily  diphenhydrAMINE 25 milliGRAM(s) Oral every 6 hours PRN  influenza   Vaccine 0.5 milliLiter(s) IntraMuscular once  metoclopramide 5 milliGRAM(s) Oral every 8 hours PRN  ondansetron    Tablet 4 milliGRAM(s) Oral every 6 hours PRN  pyridoxine 25 milliGRAM(s) Oral every 24 hours      Vital Signs Last 24 Hrs  T(C): 37.2 (04 Apr 2024 11:18), Max: 37.2 (04 Apr 2024 11:18)  T(F): 98.9 (04 Apr 2024 11:18), Max: 98.9 (04 Apr 2024 11:18)  HR: 78 (04 Apr 2024 11:18) (60 - 78)  BP: 96/52 (04 Apr 2024 11:18) (90/52 - 96/52)  BP(mean): 65 (03 Apr 2024 16:41) (65 - 65)  RR: 18 (04 Apr 2024 11:18) (18 - 18)  SpO2: 99% (04 Apr 2024 11:18) (98% - 99%)    Parameters below as of 04 Apr 2024 11:18  Patient On (Oxygen Delivery Method): room air        Physical Exam:  General:    NAD,  non toxic  Respiratory:   comfortable on RA  abd:     soft,     no tenderness  :   no CVAT,  no suprapubic tenderness,   no  luther  Musculoskeletal:   no joint swelling  vascular: no phlebitis  Skin:    no rash                            11.8   7.65  )-----------( 232      ( 04 Apr 2024 10:30 )             34.9       04-04    136  |  102  |  6<L>  ----------------------------<  74  3.5   |  22  |  0.59    Ca    8.9      04 Apr 2024 10:30  Phos  3.7     04-04  Mg     2.0     04-04        Urinalysis Basic - ( 04 Apr 2024 10:30 )    Color: x / Appearance: x / SG: x / pH: x  Gluc: 74 mg/dL / Ketone: x  / Bili: x / Urobili: x   Blood: x / Protein: x / Nitrite: x   Leuk Esterase: x / RBC: x / WBC x   Sq Epi: x / Non Sq Epi: x / Bacteria: x        MICROBIOLOGY:  v  .Urine Urine  04-03-24 --  --  --      .Blood Blood-Peripheral  04-02-24   No growth at 48 Hours  --  --      Clean Catch Clean Catch (Midstream)  04-02-24   <10,000 CFU/mL Normal Urogenital Tasha  --  --                RADIOLOGY:  Images independently visualized and reviewed personally, findings as below  < from: US Kidney and Bladder (04.02.24 @ 13:19) >  IMPRESSION:  No hydronephrosis or perinephric collection.    < end of copied text >  < from: Xray Chest 1 View AP/PA (04.02.24 @ 05:09) >  IMPRESSION:  Clear lungs.    < end of copied text >  
************************  Gmóez Tay MD  Internal Medicine PGY-1  ************************    Patient is a 33y old  Female who presents with a chief complaint of Hematemesis, recurrent UTI (04 Apr 2024 08:45)    Overnight no events, this morning patient with no acute complaints. Patient urinating well with BM(s). Denies CP, SOB, fevers/chills, or abdominal pain. Patient reminded of ongoing careplan.    MEDICATIONS  (STANDING):  chlorhexidine 4% Liquid 1 Application(s) Topical daily  influenza   Vaccine 0.5 milliLiter(s) IntraMuscular once  pyridoxine 25 milliGRAM(s) Oral every 24 hours    MEDICATIONS  (PRN):  acetaminophen     Tablet .. 650 milliGRAM(s) Oral every 6 hours PRN Temp greater or equal to 38C (100.4F), Mild Pain (1 - 3)  diphenhydrAMINE 25 milliGRAM(s) Oral every 6 hours PRN nausea/vomiting  metoclopramide 5 milliGRAM(s) Oral every 8 hours PRN nausea/vomiting  ondansetron    Tablet 4 milliGRAM(s) Oral every 6 hours PRN Nausea and/or Vomiting      CAPILLARY BLOOD GLUCOSE        I&O's Summary    03 Apr 2024 07:01  -  04 Apr 2024 07:00  --------------------------------------------------------  IN: 290 mL / OUT: 1000 mL / NET: -710 mL        PHYSICAL EXAM:  Vital Signs Last 24 Hrs  T(C): 37.2 (04 Apr 2024 11:18), Max: 37.2 (04 Apr 2024 11:18)  T(F): 98.9 (04 Apr 2024 11:18), Max: 98.9 (04 Apr 2024 11:18)  HR: 78 (04 Apr 2024 11:18) (60 - 78)  BP: 96/52 (04 Apr 2024 11:18) (90/52 - 96/52)  BP(mean): 65 (03 Apr 2024 16:41) (65 - 65)  RR: 18 (04 Apr 2024 11:18) (18 - 18)  SpO2: 99% (04 Apr 2024 11:18) (98% - 99%)    Parameters below as of 04 Apr 2024 11:18  Patient On (Oxygen Delivery Method): room air        PHYSICAL EXAM:  GENERAL: NAD, lying in bed comfortably  HEENT: NC/AT  NECK: Supple, No JVD  CHEST/LUNG: CTAB, no increased WOB  HEART: RRR, no m/r/g  ABDOMEN: soft, NT, ND, BS+  EXTREMITIES:  2+ peripheral pulses, no edema  NERVOUS SYSTEM:  A&Ox3  MSK: FROM all 4 extremities, full and equal strength  SKIN: No rashes or lesions    LABS:                        11.8   7.65  )-----------( 232      ( 04 Apr 2024 10:30 )             34.9     04-04    136  |  102  |  6<L>  ----------------------------<  74  3.5   |  22  |  0.59    Ca    8.9      04 Apr 2024 10:30  Phos  3.7     04-04  Mg     2.0     04-04            Urinalysis Basic - ( 04 Apr 2024 10:30 )    Color: x / Appearance: x / SG: x / pH: x  Gluc: 74 mg/dL / Ketone: x  / Bili: x / Urobili: x   Blood: x / Protein: x / Nitrite: x   Leuk Esterase: x / RBC: x / WBC x   Sq Epi: x / Non Sq Epi: x / Bacteria: x        Culture - Acid Fast - Urine (collected 03 Apr 2024 16:14)  Source: .Urine Urine    Culture - Blood (collected 02 Apr 2024 07:56)  Source: .Blood Blood-Peripheral  Preliminary Report (04 Apr 2024 12:01):    No growth at 48 Hours    Culture - Urine (collected 02 Apr 2024 04:39)  Source: Clean Catch Clean Catch (Midstream)  Final Report (03 Apr 2024 08:47):    <10,000 CFU/mL Normal Urogenital Tasha        
************************  Gómez Tay MD  Internal Medicine PGY-1  ************************    Patient is a 33y old  Female who presents with a chief complaint of Hematemesis, recurrent UTI (03 Apr 2024 07:37)    Overnight no events, this morning patient with no acute complaints. Patient urinating well with BM(s). Endorses improved nausea. Denies CP, SOB, fevers/chills, or abdominal pain. Patient reminded of ongoing careplan.    MEDICATIONS  (STANDING):  ertapenem  IVPB 1000 milliGRAM(s) IV Intermittent every 24 hours  influenza   Vaccine 0.5 milliLiter(s) IntraMuscular once  lactated ringers. 1000 milliLiter(s) (75 mL/Hr) IV Continuous <Continuous>  pantoprazole  Injectable 40 milliGRAM(s) IV Push two times a day  promethazine Suppository 12.5 milliGRAM(s) Rectal once  pyridoxine 25 milliGRAM(s) Oral every 24 hours    MEDICATIONS  (PRN):  acetaminophen     Tablet .. 650 milliGRAM(s) Oral every 6 hours PRN Temp greater or equal to 38C (100.4F), Mild Pain (1 - 3)  diphenhydrAMINE 25 milliGRAM(s) Oral every 6 hours PRN nausea/vomiting  metoclopramide 5 milliGRAM(s) Oral every 8 hours PRN nausea/vomiting  ondansetron    Tablet 4 milliGRAM(s) Oral every 6 hours PRN Nausea and/or Vomiting      CAPILLARY BLOOD GLUCOSE        I&O's Summary    02 Apr 2024 07:01  -  03 Apr 2024 07:00  --------------------------------------------------------  IN: 895 mL / OUT: 0 mL / NET: 895 mL        PHYSICAL EXAM:  Vital Signs Last 24 Hrs  T(C): 37.2 (03 Apr 2024 05:20), Max: 37.5 (02 Apr 2024 18:40)  T(F): 99 (03 Apr 2024 05:20), Max: 99.5 (02 Apr 2024 18:40)  HR: 69 (03 Apr 2024 05:20) (66 - 73)  BP: 89/54 (03 Apr 2024 05:20) (89/54 - 97/62)  BP(mean): --  RR: 18 (03 Apr 2024 05:20) (17 - 18)  SpO2: 98% (03 Apr 2024 05:20) (98% - 100%)    Parameters below as of 03 Apr 2024 05:20  Patient On (Oxygen Delivery Method): room air        PHYSICAL EXAM:  GENERAL: NAD, lying in bed comfortably  HEENT: NC/AT  NECK: Supple, No JVD  CHEST/LUNG: CTAB, no increased WOB  HEART: RRR, no m/r/g  ABDOMEN: soft, NT, ND, BS+  EXTREMITIES:  2+ peripheral pulses, no edema  NERVOUS SYSTEM:  A&Ox3  SKIN: No rashes or lesions    LABS:                        11.1   6.99  )-----------( 221      ( 03 Apr 2024 07:25 )             32.8     04-03    135  |  104  |  7   ----------------------------<  77  3.9   |  15<L>  |  0.49<L>    Ca    8.6      03 Apr 2024 07:25  Phos  3.5     04-03  Mg     2.0     04-03    TPro  6.5  /  Alb  3.7  /  TBili  0.2  /  DBili  x   /  AST  17  /  ALT  27  /  AlkPhos  45  04-02          Urinalysis Basic - ( 03 Apr 2024 07:25 )    Color: x / Appearance: x / SG: x / pH: x  Gluc: 77 mg/dL / Ketone: x  / Bili: x / Urobili: x   Blood: x / Protein: x / Nitrite: x   Leuk Esterase: x / RBC: x / WBC x   Sq Epi: x / Non Sq Epi: x / Bacteria: x        Culture - Urine (collected 02 Apr 2024 04:39)  Source: Clean Catch Clean Catch (Midstream)  Final Report (03 Apr 2024 08:47):    <10,000 CFU/mL Normal Urogenital Tasha

## 2024-04-04 NOTE — DISCHARGE NOTE NURSING/CASE MANAGEMENT/SOCIAL WORK - PATIENT PORTAL LINK FT
You can access the FollowMyHealth Patient Portal offered by [facility] by registering at the following website: [email or web address]. By joining [employer]’s FollowMyHealth portal, you will also be able to view your health information using other applications (apps) compatible with our system.

## 2024-04-04 NOTE — PROGRESS NOTE ADULT - REASON FOR ADMISSION
Hematemesis, recurrent UTI

## 2024-04-04 NOTE — PROGRESS NOTE ADULT - PROBLEM SELECTOR PLAN 2
- Nan Dorman vs Gastritis  - CXR unremarkable  - Hgb 11.7--> 11     Plan:  - GI consulted, appreciate recs:   >No acute intervention  - c/w IV protonix   - CBC q12h; active type and screen ; maintain 2 large bore IV   - OBGYN following, appreciate recs:  > Anti-emetic regimen per OBGYN recs put in  - c/w supportive IV fluids    - c/w Protonix - Nan Dorman vs Gastritis  - CXR unremarkable  - Hgb 11.7--> 11     Plan:  - GI consulted, appreciate recs:   >No acute intervention  - s/p IV protonix   - Hgb stable throughout hospitalization   - OBGYN following, appreciate recs:  > Anti-emetic regimen per OBGYN recs put in  - c/w supportive IV fluids

## 2024-04-04 NOTE — PROGRESS NOTE ADULT - PROBLEM SELECTOR PLAN 4
DVT: hold chemical DVT prophylaxis in setting of hematemesis ; SCD   Diet: regular  Dispo: home
DVT: hold chemical DVT prophylaxis in setting of hematemesis ; SCD   Diet: regular  Dispo: home

## 2024-04-04 NOTE — PROGRESS NOTE ADULT - PROBLEM SELECTOR PLAN 3
- Likely 2/2 NS IVF  - Patient asymptomatic    Plan:  - changed supportive fluids from NS to LR  - Monitor BMP

## 2024-04-07 LAB
CULTURE RESULTS: SIGNIFICANT CHANGE UP
SPECIMEN SOURCE: SIGNIFICANT CHANGE UP

## 2024-04-09 ENCOUNTER — NON-APPOINTMENT (OUTPATIENT)
Age: 34
End: 2024-04-09

## 2024-04-15 ENCOUNTER — APPOINTMENT (OUTPATIENT)
Dept: GASTROENTEROLOGY | Facility: CLINIC | Age: 34
End: 2024-04-15

## 2024-04-17 ENCOUNTER — APPOINTMENT (OUTPATIENT)
Dept: OBGYN | Facility: CLINIC | Age: 34
End: 2024-04-17

## 2024-04-21 ENCOUNTER — OUTPATIENT (OUTPATIENT)
Dept: OUTPATIENT SERVICES | Facility: HOSPITAL | Age: 34
LOS: 1 days | End: 2024-04-21
Payer: MEDICAID

## 2024-04-21 DIAGNOSIS — R10.30 LOWER ABDOMINAL PAIN, UNSPECIFIED: ICD-10-CM

## 2024-04-21 DIAGNOSIS — R42 DIZZINESS AND GIDDINESS: ICD-10-CM

## 2024-04-21 DIAGNOSIS — N83.209 UNSPECIFIED OVARIAN CYST, UNSPECIFIED SIDE: ICD-10-CM

## 2024-04-21 DIAGNOSIS — O21.2 LATE VOMITING OF PREGNANCY: ICD-10-CM

## 2024-04-21 DIAGNOSIS — K59.00 CONSTIPATION, UNSPECIFIED: ICD-10-CM

## 2024-04-21 DIAGNOSIS — O99.612 DISEASES OF THE DIGESTIVE SYSTEM COMPLICATING PREGNANCY, SECOND TRIMESTER: ICD-10-CM

## 2024-04-21 DIAGNOSIS — O26.899 OTHER SPECIFIED PREGNANCY RELATED CONDITIONS, UNSPECIFIED TRIMESTER: ICD-10-CM

## 2024-04-21 DIAGNOSIS — O99.282 ENDOCRINE, NUTRITIONAL AND METABOLIC DISEASES COMPLICATING PREGNANCY, SECOND TRIMESTER: ICD-10-CM

## 2024-04-21 DIAGNOSIS — O34.82 MATERNAL CARE FOR OTHER ABNORMALITIES OF PELVIC ORGANS, SECOND TRIMESTER: ICD-10-CM

## 2024-04-21 DIAGNOSIS — R11.0 NAUSEA: ICD-10-CM

## 2024-04-21 DIAGNOSIS — Z3A.16 16 WEEKS GESTATION OF PREGNANCY: ICD-10-CM

## 2024-04-21 DIAGNOSIS — O26.892 OTHER SPECIFIED PREGNANCY RELATED CONDITIONS, SECOND TRIMESTER: ICD-10-CM

## 2024-04-22 VITALS — OXYGEN SATURATION: 100 % | HEART RATE: 72 BPM

## 2024-04-22 VITALS — HEART RATE: 60 BPM | SYSTOLIC BLOOD PRESSURE: 95 MMHG | DIASTOLIC BLOOD PRESSURE: 51 MMHG

## 2024-04-22 LAB
ADD ON TEST-SPECIMEN IN LAB: SIGNIFICANT CHANGE UP
ALBUMIN SERPL ELPH-MCNC: 4.3 G/DL — SIGNIFICANT CHANGE UP (ref 3.3–5)
ALP SERPL-CCNC: 49 U/L — SIGNIFICANT CHANGE UP (ref 40–120)
ALT FLD-CCNC: 21 U/L — SIGNIFICANT CHANGE UP (ref 10–45)
ANION GAP SERPL CALC-SCNC: 13 MMOL/L — SIGNIFICANT CHANGE UP (ref 5–17)
APPEARANCE UR: CLEAR — SIGNIFICANT CHANGE UP
AST SERPL-CCNC: 17 U/L — SIGNIFICANT CHANGE UP (ref 10–40)
BACTERIA # UR AUTO: NEGATIVE /HPF — SIGNIFICANT CHANGE UP
BASOPHILS # BLD AUTO: 0.02 K/UL — SIGNIFICANT CHANGE UP (ref 0–0.2)
BASOPHILS NFR BLD AUTO: 0.2 % — SIGNIFICANT CHANGE UP (ref 0–2)
BILIRUB SERPL-MCNC: 0.3 MG/DL — SIGNIFICANT CHANGE UP (ref 0.2–1.2)
BILIRUB UR-MCNC: NEGATIVE — SIGNIFICANT CHANGE UP
BUN SERPL-MCNC: 6 MG/DL — LOW (ref 7–23)
CALCIUM SERPL-MCNC: 9.8 MG/DL — SIGNIFICANT CHANGE UP (ref 8.4–10.5)
CAST: 2 /LPF — SIGNIFICANT CHANGE UP (ref 0–4)
CHLORIDE SERPL-SCNC: 101 MMOL/L — SIGNIFICANT CHANGE UP (ref 96–108)
CO2 SERPL-SCNC: 23 MMOL/L — SIGNIFICANT CHANGE UP (ref 22–31)
COLOR SPEC: YELLOW — SIGNIFICANT CHANGE UP
CREAT SERPL-MCNC: 0.55 MG/DL — SIGNIFICANT CHANGE UP (ref 0.5–1.3)
DIFF PNL FLD: NEGATIVE — SIGNIFICANT CHANGE UP
EGFR: 124 ML/MIN/1.73M2 — SIGNIFICANT CHANGE UP
EOSINOPHIL # BLD AUTO: 0.23 K/UL — SIGNIFICANT CHANGE UP (ref 0–0.5)
EOSINOPHIL NFR BLD AUTO: 2.6 % — SIGNIFICANT CHANGE UP (ref 0–6)
GLUCOSE SERPL-MCNC: 77 MG/DL — SIGNIFICANT CHANGE UP (ref 70–99)
GLUCOSE UR QL: NEGATIVE MG/DL — SIGNIFICANT CHANGE UP
HCT VFR BLD CALC: 37.5 % — SIGNIFICANT CHANGE UP (ref 34.5–45)
HGB BLD-MCNC: 12.6 G/DL — SIGNIFICANT CHANGE UP (ref 11.5–15.5)
IMM GRANULOCYTES NFR BLD AUTO: 0.2 % — SIGNIFICANT CHANGE UP (ref 0–0.9)
KETONES UR-MCNC: >=160 MG/DL
LEUKOCYTE ESTERASE UR-ACNC: ABNORMAL
LYMPHOCYTES # BLD AUTO: 2.16 K/UL — SIGNIFICANT CHANGE UP (ref 1–3.3)
LYMPHOCYTES # BLD AUTO: 24 % — SIGNIFICANT CHANGE UP (ref 13–44)
MAGNESIUM SERPL-MCNC: 2.1 MG/DL — SIGNIFICANT CHANGE UP (ref 1.6–2.6)
MCHC RBC-ENTMCNC: 28.9 PG — SIGNIFICANT CHANGE UP (ref 27–34)
MCHC RBC-ENTMCNC: 33.6 GM/DL — SIGNIFICANT CHANGE UP (ref 32–36)
MCV RBC AUTO: 86 FL — SIGNIFICANT CHANGE UP (ref 80–100)
MONOCYTES # BLD AUTO: 0.56 K/UL — SIGNIFICANT CHANGE UP (ref 0–0.9)
MONOCYTES NFR BLD AUTO: 6.2 % — SIGNIFICANT CHANGE UP (ref 2–14)
NEUTROPHILS # BLD AUTO: 6 K/UL — SIGNIFICANT CHANGE UP (ref 1.8–7.4)
NEUTROPHILS NFR BLD AUTO: 66.8 % — SIGNIFICANT CHANGE UP (ref 43–77)
NITRITE UR-MCNC: NEGATIVE — SIGNIFICANT CHANGE UP
NRBC # BLD: 0 /100 WBCS — SIGNIFICANT CHANGE UP (ref 0–0)
PH UR: 6.5 — SIGNIFICANT CHANGE UP (ref 5–8)
PHOSPHATE SERPL-MCNC: 3.6 MG/DL — SIGNIFICANT CHANGE UP (ref 2.5–4.5)
PLATELET # BLD AUTO: 246 K/UL — SIGNIFICANT CHANGE UP (ref 150–400)
POTASSIUM SERPL-MCNC: 3.7 MMOL/L — SIGNIFICANT CHANGE UP (ref 3.5–5.3)
POTASSIUM SERPL-SCNC: 3.7 MMOL/L — SIGNIFICANT CHANGE UP (ref 3.5–5.3)
PROT SERPL-MCNC: 7.7 G/DL — SIGNIFICANT CHANGE UP (ref 6–8.3)
PROT UR-MCNC: NEGATIVE MG/DL — SIGNIFICANT CHANGE UP
RBC # BLD: 4.36 M/UL — SIGNIFICANT CHANGE UP (ref 3.8–5.2)
RBC # FLD: 12 % — SIGNIFICANT CHANGE UP (ref 10.3–14.5)
RBC CASTS # UR COMP ASSIST: 2 /HPF — SIGNIFICANT CHANGE UP (ref 0–4)
SODIUM SERPL-SCNC: 137 MMOL/L — SIGNIFICANT CHANGE UP (ref 135–145)
SP GR SPEC: 1.01 — SIGNIFICANT CHANGE UP (ref 1–1.03)
SQUAMOUS # UR AUTO: 2 /HPF — SIGNIFICANT CHANGE UP (ref 0–5)
UROBILINOGEN FLD QL: 0.2 MG/DL — SIGNIFICANT CHANGE UP (ref 0.2–1)
WBC # BLD: 8.99 K/UL — SIGNIFICANT CHANGE UP (ref 3.8–10.5)
WBC # FLD AUTO: 8.99 K/UL — SIGNIFICANT CHANGE UP (ref 3.8–10.5)
WBC UR QL: 4 /HPF — SIGNIFICANT CHANGE UP (ref 0–5)

## 2024-04-22 PROCEDURE — 96361 HYDRATE IV INFUSION ADD-ON: CPT

## 2024-04-22 PROCEDURE — 84100 ASSAY OF PHOSPHORUS: CPT

## 2024-04-22 PROCEDURE — 81001 URINALYSIS AUTO W/SCOPE: CPT

## 2024-04-22 PROCEDURE — 93010 ELECTROCARDIOGRAM REPORT: CPT

## 2024-04-22 PROCEDURE — 36415 COLL VENOUS BLD VENIPUNCTURE: CPT

## 2024-04-22 PROCEDURE — 85025 COMPLETE CBC W/AUTO DIFF WBC: CPT

## 2024-04-22 PROCEDURE — G0463: CPT

## 2024-04-22 PROCEDURE — 80053 COMPREHEN METABOLIC PANEL: CPT

## 2024-04-22 PROCEDURE — 96374 THER/PROPH/DIAG INJ IV PUSH: CPT

## 2024-04-22 PROCEDURE — 96375 TX/PRO/DX INJ NEW DRUG ADDON: CPT

## 2024-04-22 PROCEDURE — 93005 ELECTROCARDIOGRAM TRACING: CPT

## 2024-04-22 PROCEDURE — G0378: CPT

## 2024-04-22 PROCEDURE — 83735 ASSAY OF MAGNESIUM: CPT

## 2024-04-22 RX ORDER — SUCRALFATE 1 G
1 TABLET ORAL
Refills: 0 | Status: DISCONTINUED | OUTPATIENT
Start: 2024-04-22 | End: 2024-05-06

## 2024-04-22 RX ORDER — SENNA PLUS 8.6 MG/1
2 TABLET ORAL AT BEDTIME
Refills: 0 | Status: DISCONTINUED | OUTPATIENT
Start: 2024-04-22 | End: 2024-05-06

## 2024-04-22 RX ORDER — SODIUM CHLORIDE 9 MG/ML
1000 INJECTION, SOLUTION INTRAVENOUS ONCE
Refills: 0 | Status: COMPLETED | OUTPATIENT
Start: 2024-04-22 | End: 2024-04-22

## 2024-04-22 RX ORDER — ONDANSETRON 8 MG/1
4 TABLET, FILM COATED ORAL EVERY 6 HOURS
Refills: 0 | Status: DISCONTINUED | OUTPATIENT
Start: 2024-04-22 | End: 2024-05-06

## 2024-04-22 RX ORDER — ONDANSETRON 8 MG/1
4 TABLET, FILM COATED ORAL ONCE
Refills: 0 | Status: COMPLETED | OUTPATIENT
Start: 2024-04-22 | End: 2024-04-22

## 2024-04-22 RX ORDER — SODIUM CHLORIDE 9 MG/ML
1000 INJECTION, SOLUTION INTRAVENOUS
Refills: 0 | Status: DISCONTINUED | OUTPATIENT
Start: 2024-04-22 | End: 2024-05-06

## 2024-04-22 RX ORDER — PANTOPRAZOLE SODIUM 20 MG/1
20 TABLET, DELAYED RELEASE ORAL ONCE
Refills: 0 | Status: COMPLETED | OUTPATIENT
Start: 2024-04-22 | End: 2024-04-22

## 2024-04-22 RX ORDER — PYRIDOXINE HCL (VITAMIN B6) 100 MG
100 TABLET ORAL ONCE
Refills: 0 | Status: COMPLETED | OUTPATIENT
Start: 2024-04-22 | End: 2024-04-22

## 2024-04-22 RX ORDER — DIPHENHYDRAMINE HCL 50 MG
25 CAPSULE ORAL EVERY 8 HOURS
Refills: 0 | Status: DISCONTINUED | OUTPATIENT
Start: 2024-04-22 | End: 2024-05-06

## 2024-04-22 RX ORDER — FAMOTIDINE 10 MG/ML
20 INJECTION INTRAVENOUS ONCE
Refills: 0 | Status: COMPLETED | OUTPATIENT
Start: 2024-04-22 | End: 2024-04-22

## 2024-04-22 RX ORDER — ACETAMINOPHEN 500 MG
1000 TABLET ORAL ONCE
Refills: 0 | Status: COMPLETED | OUTPATIENT
Start: 2024-04-22 | End: 2024-04-22

## 2024-04-22 RX ORDER — METOCLOPRAMIDE HCL 10 MG
10 TABLET ORAL ONCE
Refills: 0 | Status: COMPLETED | OUTPATIENT
Start: 2024-04-22 | End: 2024-04-22

## 2024-04-22 RX ORDER — ONDANSETRON 8 MG/1
1 TABLET, FILM COATED ORAL
Qty: 30 | Refills: 0
Start: 2024-04-22

## 2024-04-22 RX ORDER — PYRIDOXINE HCL (VITAMIN B6) 100 MG
1 TABLET ORAL
Qty: 30 | Refills: 0
Start: 2024-04-22

## 2024-04-22 RX ORDER — SODIUM CHLORIDE 9 MG/ML
1000 INJECTION, SOLUTION INTRAVENOUS
Refills: 0 | Status: DISCONTINUED | OUTPATIENT
Start: 2024-04-22 | End: 2024-04-22

## 2024-04-22 RX ORDER — CITRIC ACID/SODIUM CITRATE 300-500 MG
30 SOLUTION, ORAL ORAL ONCE
Refills: 0 | Status: COMPLETED | OUTPATIENT
Start: 2024-04-22 | End: 2024-04-22

## 2024-04-22 RX ADMIN — Medication 30 MILLILITER(S): at 07:20

## 2024-04-22 RX ADMIN — Medication 1 GRAM(S): at 11:16

## 2024-04-22 RX ADMIN — SODIUM CHLORIDE 1000 MILLILITER(S): 9 INJECTION, SOLUTION INTRAVENOUS at 06:39

## 2024-04-22 RX ADMIN — SODIUM CHLORIDE 250 MILLILITER(S): 9 INJECTION, SOLUTION INTRAVENOUS at 08:21

## 2024-04-22 RX ADMIN — Medication 100 MILLIGRAM(S): at 08:02

## 2024-04-22 RX ADMIN — Medication 400 MILLIGRAM(S): at 01:46

## 2024-04-22 RX ADMIN — SODIUM CHLORIDE 125 MILLILITER(S): 9 INJECTION, SOLUTION INTRAVENOUS at 01:46

## 2024-04-22 RX ADMIN — FAMOTIDINE 20 MILLIGRAM(S): 10 INJECTION INTRAVENOUS at 01:41

## 2024-04-22 RX ADMIN — PANTOPRAZOLE SODIUM 20 MILLIGRAM(S): 20 TABLET, DELAYED RELEASE ORAL at 08:03

## 2024-04-22 RX ADMIN — SODIUM CHLORIDE 1000 MILLILITER(S): 9 INJECTION, SOLUTION INTRAVENOUS at 00:37

## 2024-04-22 RX ADMIN — ONDANSETRON 4 MILLIGRAM(S): 8 TABLET, FILM COATED ORAL at 10:33

## 2024-04-22 RX ADMIN — Medication 10 MILLIGRAM(S): at 01:39

## 2024-04-22 RX ADMIN — ONDANSETRON 4 MILLIGRAM(S): 8 TABLET, FILM COATED ORAL at 04:26

## 2024-04-22 NOTE — PROGRESS NOTE ADULT - SUBJECTIVE AND OBJECTIVE BOX
R3 Antepartum Note, HD#2    Patient seen and examined at bedside, continues to feel heart burn/ epigastric pain, denies nausea/vomiting at this time however feels nauseous when eating and failed her last PO changes w/ cracker. Pt reports +FM, denies LOF, VB, ctx, HA, blurred vision, CP, SOB, N/V, fevers, and chills.    Vital Signs Last 24 Hours  T(C): 36.5 (04-22-24 @ 06:44), Max: 37.1 (04-22-24 @ 00:09)  HR: 82 (04-22-24 @ 08:02) (49 - 90)  BP: 82/37 (04-22-24 @ 07:58) (77/49 - 108/71)  RR: 16 (04-22-24 @ 06:44) (16 - 18)  SpO2: 100% (04-22-24 @ 08:02) (92% - 100%)    CAPILLARY BLOOD GLUCOSE          Physical Exam:  General: NAD  Abdomen: Soft, non-tender, gravid  Ext: No pain or swelling    NST reactive overnight    Labs:             12.6   8.99  )-----------( 246      ( 04-22 @ 00:47 )             37.5     04-22 @ 00:46    137  |  101  |  6   ----------------------------<  77  3.7   |  23  |  0.55    Ca    9.8      04-22 @ 00:46  Phos  3.6     04-22 @ 00:46  Mg     2.1     04-22 @ 00:46    TPro  7.7  /  Alb  4.3  /  TBili  0.3  /  DBili  x   /  AST  17  /  ALT  21  /  AlkPhos  49  04-22 @ 00:46        MEDICATIONS  (STANDING):  diphenhydrAMINE Injectable 25 milliGRAM(s) IV Push every 8 hours  lactated ringers 1000 milliLiter(s) (250 mL/Hr) IV Continuous <Continuous>  lactated ringers. 1000 milliLiter(s) (125 mL/Hr) IV Continuous <Continuous>  ondansetron Injectable 4 milliGRAM(s) IV Push every 6 hours  pantoprazole  Injectable 20 milliGRAM(s) IV Push once  pyridoxine 100 milliGRAM(s) Oral once  senna 2 Tablet(s) Oral at bedtime

## 2024-04-22 NOTE — OB PROVIDER TRIAGE NOTE - ATTENDING COMMENTS
OB  Addendum     Patient is a 35 yo  at 16w2d with nausea/vomiting of pregnancy presenting to the hospital for increased symptoms as well as lower abdominal pain. This is the patient’s third presentation to the hospital for similar complaints. She denies sick contacts. Associated symptoms include lower abdominal pain with dysuria as well as constipation and headache. She previously was prescribed vitamin B6 which she discontinued due to lack of perceived benefit as well as antihistamine, which she discontinued due to side effects and lack of benefit. She is not taking any other antiemetic medications currently. She last ate yogurt at 3pm and last vomited about an hour prior to presentation. Denies additional obstetrical complaints.     Prior  , reportedly uncomplicated, denies prior surgeries. P[facility]atal care at [facility].    PMH: pituitary adenoma, had MRI that was reportedly normal     Exam:   Gen: patient well-appearing, NAD   Abd: mild tenderness with palpation of lower abdomen bilaterally, no rebound or guarding   : no CVA tenderness bilaterally   bpm     Plan:   -Urinalysis, urine culture, CMP, electrolytes   -IV hydration, Pepcid, IV antiemetic regimen (including Reglan)   -Tylenol for headache   -Bowel regimen when no longer vomiting   -PO challenge thereafter     Addendum: patient failed PO challenge (could not tolerate sips of water and a cracker), will keep for 23 hour obs with continued IV antiemetics     Nicholas Jacobson MD

## 2024-04-22 NOTE — OB PROVIDER TRIAGE NOTE - NSPROVTRIAGESELHIDDEN_OBGYN_ALL_OB_FT
Dr. Martinez notified of pts ultrasound/urine results.   [[email or web address]_OBGYN_ALL_OB:[id number]=]

## 2024-04-22 NOTE — PROGRESS NOTE ADULT - ASSESSMENT
35yo  at 16w2d p/f nausea/vomiting in pregnancy, s/p zofran, reglan w/ minimal improvement. Had discontinued Diclegis outpatient, was counselled on restarting and continued for prevention. Patient otherwise HDS and beng treated for acute exacerbation of nausea/vomitting/GI irritaion:    # n/v in pregnancy  - standing zofran 4mg IVP q6hrs, pepcid 20IVP bId, protonix qD, bicitra stat, cerafate stat  - 1L bolus w/ multivitamin @250cc/hr  - will PO trail after resuscitation    Amyeo Afroz Jereen, PGY-3

## 2024-04-22 NOTE — OB PROVIDER TRIAGE NOTE - NSHPPHYSICALEXAM_GEN_ALL_CORE
T(C): 36.6 (04-21-24 @ 23:25), Max: 36.6 (04-21-24 @ 23:25)  HR: 79 (04-21-24 @ 23:25) (79 - 79)  BP: 108/71 (04-21-24 @ 23:25) (108/71 - 108/71)  RR: 18 (04-21-24 @ 23:25) (18 - 18)  SpO2: 98% (04-21-24 @ 23:25) (98% - 98%)  Gen: NAD, well-appearing   Lungs: CTAB  Heart: RRR   Abd: Soft, gravid  SVE:    Bedside sono:  FHT:  [place]: T(C): 36.6 (04-21-24 @ 23:25), Max: 36.6 (04-21-24 @ 23:25)  HR: 79 (04-21-24 @ 23:25) (79 - 79)  BP: 108/71 (04-21-24 @ 23:25) (108/71 - 108/71)  RR: 18 (04-21-24 @ 23:25) (18 - 18)  SpO2: 98% (04-21-24 @ 23:25) (98% - 98%)  Gen: NAD, well-appearing   Lungs: CTAB  Heart: RRR   Abd: Soft, gravid, TTP in suprapubic region  SVE: deffered   Bedside sono: transverse lie, -165  [place]: acontractile

## 2024-04-22 NOTE — OB RN TRIAGE NOTE - CHIEF COMPLAINT QUOTE
"I have been throwing up for 3 days and it burns when I urinate and I have a headache. I feel tired"

## 2024-04-22 NOTE — OB PROVIDER TRIAGE NOTE - NSOBPROVIDERNOTE_OBGYN_ALL_OB_FT
A/P: 34y GP at _ weeks GA presents to L&D for .   -Admit to L&D  -Consent  -Admission labs  -CLD or NPO, except ice chips   -IV fluids or Rotating IV fluids (LR/D5LR)  -Labor: Intact/*ROM. Latent/Active labor. Jim *. Induce labor with ______.  -Fetus: Cat I tracing. Continuous toco and fetal monitoring.   -GBS: Negative, no GBS ppx required   -Analgesia: epi PRN  -DVT ppx: Ambulate and SCD's while in bed     Discussed with Dr. Jacobson,    Radha Kaur, PGY3 A/P: 33y/o  at 16w2d GA presents to L&D with lower and epigastric abdominal pain and vomiting x3 days. Patient has not had any bouts of emesis here. Patient to receive medications to relieve her epigastric pain related to heartburn, tylenol and reglan. To continue observation and at this time and f/u urinalysis.     #nausea  -Reglan, Zofran PRN, 1 L Bolus-> Maintenance Fluids  -eventual PO challenge      #dysuria  -UA neg, f/u UTI    #Fetal Well-being   -FH+, good FM, MVP >3      Discussed with Dr. Jacobson,    Radha Kaur, PGY3 A/P: 35y/o  at 16w2d GA presents to L&D with lower and epigastric abdominal pain and vomiting x3 days. Patient has not had any bouts of emesis here. Patient to receive medications to relieve her epigastric pain related to heartburn, tylenol and reglan. To continue observation and at this time and f/u urinalysis.     #nausea  -Reglan, Zofran PRN, 1 L Bolus-> Maintenance Fluids  -eventual PO challenge      #dysuria  -UA neg, f/u UCx    #Fetal Well-being   -FH+, good FM, MVP >3      Discussed with Dr. Jacobson,    Radha Kaur, PGY3      Interval update  Patient's labs all normal. UA + for ketones and LE consistent with dehydration. Will f/u UCx. PO challenge unsuccessful as patient vomited after a few sips of water and a half a cracker. Zofran IVPush given. Will order sennacot as patient complains of constipation and notes that nausea improves some with bowel movement. Notes emesis is NBNB. Will retry PO challenge soon. Continue IVH. Plan communicated with patient through  Charles ID#[id number].    Radha Kaur MD, PGY-3 A/P: 33y/o  at 16w2d GA presents to L&D with lower and epigastric abdominal pain and vomiting x3 days. Patient has not had any bouts of emesis here. Patient to receive medications to relieve her epigastric pain related to heartburn, tylenol and reglan. To continue observation and at this time and f/u urinalysis.     #nausea  -Reglan, Zofran PRN, 1 L Bolus-> Maintenance Fluids  -eventual PO challenge    #dysuria  -UA neg, f/u UCx    #Fetal Well-being   -FH+, good FM, MVP >3      Discussed with Dr. Jacobson,    Radha Kaur, PGY3      Interval update  Patient's labs all normal. UA + for ketones and LE consistent with dehydration. Will f/u UCx. PO challenge unsuccessful as patient vomited after a few sips of water and a half a cracker. Zofran IVPush given. Will order sennacot as patient complains of constipation and notes that nausea improves some with bowel movement. Notes emesis is NBNB. Will retry PO challenge soon. Continue IVH. Plan communicated with patient through  [facility] ID#[id number].    Radha Kaur MD, PGY-3    Update@[id number]  :[id number]. Pt re-evaluated, states that nausea is much improved. She is now tolerating PO sp Zofran. Pt has f/u with [facility] on . Will send rx for B6 and Zofran ODT's to [facility], pt advised to call her doctors if her symptoms are not improved. All questions answered.   - f/u Ucx    Patient seen and re-evaluated with Dr. Yves Haque, PGY-4

## 2024-04-22 NOTE — OB PROVIDER TRIAGE NOTE - HISTORY OF PRESENT ILLNESS
34y GP at _ weeks GA presents to L&D for . Patient denies vaginal bleeding, contractions and leakage of fluid. She endorses good fetal movement. Denies fevers, chills, nausea and vomiting. No other complaints at this time. Prenatal course is significant for:     Prenatal course uncomplicated.    WILLIAM:      POB:  PGYN: denies fibroids, ovarian cysts, STD hx, or abnormal PAPs   PMH: Denies  PSH: Denies  SH: Denies EtOH, tobacco and illicit drug use during this pregnancy; feels safe at home   Psych Hx: denies hx of anxiety or depression  Meds: PNVs  Allergies: NKDA    EFW:    GBS:            33y/o  at 16w2d GA presents to L&D with lower and epigastric abdominal pain and vomiting x3 days. Patient states that she has had multiple bouts of emesis since 5 weeks gestation. She receives her care with Kita Chaudhari NP at [facility]. She was hospitalized twice during this pregnancy for similar symptoms. Patient states that she has dysuria and constipation with some faint blood noted rectally when she strains. Patient states that she has a very painful headache when she wakens in the morning, though she has not taken medication for it. Patient was prescribed Unisom during this pregnancy, however, she states that she has not been taking it regularly 2/2 fatigue. Patient denies vaginal bleeding, contractions and leakage of fluid. She endorses good fetal movement. Denies fevers, chills or other sick contacts/recent travel. No other complaints at this time. Prenatal course is significant for: hyperemesis gravidarum.  WILLIAM: Oct 5, 2024      POB:   6#8, uncomplicated per patient.  PGYN: Hx of ovarian cysts, denies fibroids, STD hx, or abnormal PAPs   PMH: Pituitary adenoma, pyelonephritis (outside of pregnancy)  PSH: Denies  SH: Denies EtOH, tobacco and illicit drug use during this pregnancy; feels safe at home   Psych Hx: denies hx of anxiety or depression  Meds: PNVs  Allergies: NKDA            ID: [id number]    33y/o  at 16w2d GA presents to L&D with lower and epigastric abdominal pain and vomiting x3 days. Patient states that she has had multiple bouts of emesis since 5 weeks gestation. She receives her care with Kita Chaudhari NP at [facility] (next appointment is 2024. She was hospitalized twice during this pregnancy for similar symptoms and has not met criteria for home care. Patient states that she has dysuria and constipation with some faint blood noted rectally when she strains. Patient states that she has a very painful headache accompanied by dizziness when she awakens in the morning, though she has not taken medication for it. Patient was prescribed Unisom during this pregnancy, however, she states that she has not been taking it regularly 2/2 fatigue. Patient denies vaginal bleeding, contractions and leakage of fluid. She endorses good fetal movement. Denies fevers, chills or other sick contacts/recent travel. No other complaints at this time. Prenatal course is significant for: hyperemesis gravidarum.  WILLIAM: Oct 5, 2024      POB:   6#8, uncomplicated per patient.  PGYN: Hx of ovarian cysts, denies fibroids, STD hx, or abnormal PAPs   PMH: Pituitary adenoma (s/p MRI with no acute findings per patient prior to pregnancy), pyelonephritis (outside of pregnancy)  PSH: Denies  SH: Denies EtOH, tobacco and illicit drug use during this pregnancy; feels safe at home   Psych Hx: denies hx of anxiety or depression  Meds: PNVs  Allergies: NKDA

## 2024-04-22 NOTE — OB PROVIDER TRIAGE NOTE - NSHPLABSRESULTS_GEN_ALL_CORE
Blood type:   Rubella IgG: RPR:                           12.6   8.99 >-----------< 246    ( 04-22 @ 00:47 )             37.5    04-22-24 @ 00:46      137  |  101  |  6<L>  ----------------------------<  77  3.7   |  23  |  0.55        Ca    9.8      22 Apr 2024 00:46    TPro  7.7  /  Alb  4.3  /  TBili  0.3  /  DBili  x   /  AST  17  /  ALT  21  /  AlkPhos  49  04-22-24 @ 00:46

## 2024-04-24 ENCOUNTER — NON-APPOINTMENT (OUTPATIENT)
Age: 34
End: 2024-04-24

## 2024-04-25 ENCOUNTER — APPOINTMENT (OUTPATIENT)
Dept: ANTEPARTUM | Facility: CLINIC | Age: 34
End: 2024-04-25

## 2024-04-25 ENCOUNTER — OUTPATIENT (OUTPATIENT)
Dept: OUTPATIENT SERVICES | Facility: HOSPITAL | Age: 34
LOS: 1 days | End: 2024-04-25
Payer: MEDICAID

## 2024-04-25 ENCOUNTER — NON-APPOINTMENT (OUTPATIENT)
Age: 34
End: 2024-04-25

## 2024-04-25 VITALS
HEART RATE: 66 BPM | TEMPERATURE: 98 F | RESPIRATION RATE: 18 BRPM | OXYGEN SATURATION: 98 % | SYSTOLIC BLOOD PRESSURE: 100 MMHG | DIASTOLIC BLOOD PRESSURE: 46 MMHG

## 2024-04-25 VITALS
DIASTOLIC BLOOD PRESSURE: 54 MMHG | TEMPERATURE: 98 F | HEART RATE: 63 BPM | SYSTOLIC BLOOD PRESSURE: 95 MMHG | RESPIRATION RATE: 18 BRPM

## 2024-04-25 DIAGNOSIS — O34.82 MATERNAL CARE FOR OTHER ABNORMALITIES OF PELVIC ORGANS, SECOND TRIMESTER: ICD-10-CM

## 2024-04-25 DIAGNOSIS — O99.282 ENDOCRINE, NUTRITIONAL AND METABOLIC DISEASES COMPLICATING PREGNANCY, SECOND TRIMESTER: ICD-10-CM

## 2024-04-25 DIAGNOSIS — O99.352 DISEASES OF THE NERVOUS SYSTEM COMPLICATING PREGNANCY, SECOND TRIMESTER: ICD-10-CM

## 2024-04-25 DIAGNOSIS — O26.899 OTHER SPECIFIED PREGNANCY RELATED CONDITIONS, UNSPECIFIED TRIMESTER: ICD-10-CM

## 2024-04-25 DIAGNOSIS — O23.00 INFECTIONS OF KIDNEY IN PREGNANCY, UNSPECIFIED TRIMESTER: ICD-10-CM

## 2024-04-25 DIAGNOSIS — O26.892 OTHER SPECIFIED PREGNANCY RELATED CONDITIONS, SECOND TRIMESTER: ICD-10-CM

## 2024-04-25 DIAGNOSIS — E28.2 POLYCYSTIC OVARIAN SYNDROME: ICD-10-CM

## 2024-04-25 DIAGNOSIS — R58 HEMORRHAGE, NOT ELSEWHERE CLASSIFIED: ICD-10-CM

## 2024-04-25 DIAGNOSIS — K59.00 CONSTIPATION, UNSPECIFIED: ICD-10-CM

## 2024-04-25 DIAGNOSIS — G43.909 MIGRAINE, UNSPECIFIED, NOT INTRACTABLE, WITHOUT STATUS MIGRAINOSUS: ICD-10-CM

## 2024-04-25 DIAGNOSIS — Z83.3 FAMILY HISTORY OF DIABETES MELLITUS: ICD-10-CM

## 2024-04-25 DIAGNOSIS — Z3A.16 16 WEEKS GESTATION OF PREGNANCY: ICD-10-CM

## 2024-04-25 DIAGNOSIS — N83.209 UNSPECIFIED OVARIAN CYST, UNSPECIFIED SIDE: ICD-10-CM

## 2024-04-25 DIAGNOSIS — O99.612 DISEASES OF THE DIGESTIVE SYSTEM COMPLICATING PREGNANCY, SECOND TRIMESTER: ICD-10-CM

## 2024-04-25 DIAGNOSIS — Z87.59 PERSONAL HISTORY OF OTHER COMPLICATIONS OF PREGNANCY, CHILDBIRTH AND THE PUERPERIUM: ICD-10-CM

## 2024-04-25 LAB
ALBUMIN SERPL ELPH-MCNC: 3.4 G/DL — SIGNIFICANT CHANGE UP (ref 3.3–5)
ALP SERPL-CCNC: 32 U/L — LOW (ref 40–120)
ALT FLD-CCNC: 15 U/L — SIGNIFICANT CHANGE UP (ref 10–45)
AMYLASE P1 CFR SERPL: 50 U/L — SIGNIFICANT CHANGE UP (ref 25–125)
ANION GAP SERPL CALC-SCNC: 10 MMOL/L — SIGNIFICANT CHANGE UP (ref 5–17)
APPEARANCE UR: CLEAR — SIGNIFICANT CHANGE UP
AST SERPL-CCNC: 27 U/L — SIGNIFICANT CHANGE UP (ref 10–40)
BACTERIA # UR AUTO: ABNORMAL /HPF
BASOPHILS # BLD AUTO: 0.02 K/UL — SIGNIFICANT CHANGE UP (ref 0–0.2)
BASOPHILS NFR BLD AUTO: 0.3 % — SIGNIFICANT CHANGE UP (ref 0–2)
BILIRUB SERPL-MCNC: 0.2 MG/DL — SIGNIFICANT CHANGE UP (ref 0.2–1.2)
BILIRUB UR-MCNC: NEGATIVE — SIGNIFICANT CHANGE UP
BUN SERPL-MCNC: 6 MG/DL — LOW (ref 7–23)
CALCIUM SERPL-MCNC: 8.4 MG/DL — SIGNIFICANT CHANGE UP (ref 8.4–10.5)
CAST: 0 /LPF — SIGNIFICANT CHANGE UP (ref 0–4)
CHLORIDE SERPL-SCNC: 105 MMOL/L — SIGNIFICANT CHANGE UP (ref 96–108)
CO2 SERPL-SCNC: 20 MMOL/L — LOW (ref 22–31)
COLOR SPEC: YELLOW — SIGNIFICANT CHANGE UP
CREAT SERPL-MCNC: 0.45 MG/DL — LOW (ref 0.5–1.3)
DIFF PNL FLD: NEGATIVE — SIGNIFICANT CHANGE UP
EGFR: 130 ML/MIN/1.73M2 — SIGNIFICANT CHANGE UP
EOSINOPHIL # BLD AUTO: 0.25 K/UL — SIGNIFICANT CHANGE UP (ref 0–0.5)
EOSINOPHIL NFR BLD AUTO: 3.3 % — SIGNIFICANT CHANGE UP (ref 0–6)
GLUCOSE SERPL-MCNC: 80 MG/DL — SIGNIFICANT CHANGE UP (ref 70–99)
GLUCOSE UR QL: NEGATIVE MG/DL — SIGNIFICANT CHANGE UP
HCT VFR BLD CALC: 31.2 % — LOW (ref 34.5–45)
HGB BLD-MCNC: 10.4 G/DL — LOW (ref 11.5–15.5)
IMM GRANULOCYTES NFR BLD AUTO: 0.5 % — SIGNIFICANT CHANGE UP (ref 0–0.9)
KETONES UR-MCNC: NEGATIVE MG/DL — SIGNIFICANT CHANGE UP
LEUKOCYTE ESTERASE UR-ACNC: ABNORMAL
LIDOCAIN IGE QN: 32 U/L — SIGNIFICANT CHANGE UP (ref 7–60)
LYMPHOCYTES # BLD AUTO: 2.2 K/UL — SIGNIFICANT CHANGE UP (ref 1–3.3)
LYMPHOCYTES # BLD AUTO: 29.4 % — SIGNIFICANT CHANGE UP (ref 13–44)
MCHC RBC-ENTMCNC: 29.4 PG — SIGNIFICANT CHANGE UP (ref 27–34)
MCHC RBC-ENTMCNC: 33.3 GM/DL — SIGNIFICANT CHANGE UP (ref 32–36)
MCV RBC AUTO: 88.1 FL — SIGNIFICANT CHANGE UP (ref 80–100)
MONOCYTES # BLD AUTO: 0.49 K/UL — SIGNIFICANT CHANGE UP (ref 0–0.9)
MONOCYTES NFR BLD AUTO: 6.5 % — SIGNIFICANT CHANGE UP (ref 2–14)
NEUTROPHILS # BLD AUTO: 4.49 K/UL — SIGNIFICANT CHANGE UP (ref 1.8–7.4)
NEUTROPHILS NFR BLD AUTO: 60 % — SIGNIFICANT CHANGE UP (ref 43–77)
NITRITE UR-MCNC: NEGATIVE — SIGNIFICANT CHANGE UP
NRBC # BLD: 0 /100 WBCS — SIGNIFICANT CHANGE UP (ref 0–0)
PH UR: 7 — SIGNIFICANT CHANGE UP (ref 5–8)
PLATELET # BLD AUTO: 184 K/UL — SIGNIFICANT CHANGE UP (ref 150–400)
POTASSIUM SERPL-MCNC: 3.9 MMOL/L — SIGNIFICANT CHANGE UP (ref 3.5–5.3)
POTASSIUM SERPL-SCNC: 3.9 MMOL/L — SIGNIFICANT CHANGE UP (ref 3.5–5.3)
PROT SERPL-MCNC: 6 G/DL — SIGNIFICANT CHANGE UP (ref 6–8.3)
PROT UR-MCNC: NEGATIVE MG/DL — SIGNIFICANT CHANGE UP
RBC # BLD: 3.54 M/UL — LOW (ref 3.8–5.2)
RBC # FLD: 12.6 % — SIGNIFICANT CHANGE UP (ref 10.3–14.5)
RBC CASTS # UR COMP ASSIST: 2 /HPF — SIGNIFICANT CHANGE UP (ref 0–4)
SODIUM SERPL-SCNC: 135 MMOL/L — SIGNIFICANT CHANGE UP (ref 135–145)
SP GR SPEC: 1.01 — SIGNIFICANT CHANGE UP (ref 1–1.03)
SQUAMOUS # UR AUTO: 7 /HPF — HIGH (ref 0–5)
UROBILINOGEN FLD QL: 0.2 MG/DL — SIGNIFICANT CHANGE UP (ref 0.2–1)
WBC # BLD: 7.49 K/UL — SIGNIFICANT CHANGE UP (ref 3.8–10.5)
WBC # FLD AUTO: 7.49 K/UL — SIGNIFICANT CHANGE UP (ref 3.8–10.5)
WBC UR QL: 4 /HPF — SIGNIFICANT CHANGE UP (ref 0–5)

## 2024-04-25 PROCEDURE — 80053 COMPREHEN METABOLIC PANEL: CPT

## 2024-04-25 PROCEDURE — 96361 HYDRATE IV INFUSION ADD-ON: CPT

## 2024-04-25 PROCEDURE — 85025 COMPLETE CBC W/AUTO DIFF WBC: CPT

## 2024-04-25 PROCEDURE — G0463: CPT

## 2024-04-25 PROCEDURE — 81001 URINALYSIS AUTO W/SCOPE: CPT

## 2024-04-25 PROCEDURE — 82150 ASSAY OF AMYLASE: CPT

## 2024-04-25 PROCEDURE — 87086 URINE CULTURE/COLONY COUNT: CPT

## 2024-04-25 PROCEDURE — 83690 ASSAY OF LIPASE: CPT

## 2024-04-25 PROCEDURE — 96374 THER/PROPH/DIAG INJ IV PUSH: CPT

## 2024-04-25 RX ORDER — POLYETHYLENE GLYCOL 3350 17 G/17G
17 POWDER, FOR SOLUTION ORAL
Qty: 1 | Refills: 0
Start: 2024-04-25 | End: 2024-05-08

## 2024-04-25 RX ORDER — PYRIDOXINE HCL (VITAMIN B6) 100 MG
2 TABLET ORAL
Qty: 60 | Refills: 2
Start: 2024-04-25 | End: 2024-07-23

## 2024-04-25 RX ORDER — PYRIDOXINE HCL (VITAMIN B6) 100 MG
2 TABLET ORAL
Qty: 60 | Refills: 3
Start: 2024-04-25 | End: 2024-08-22

## 2024-04-25 RX ORDER — GLYCERIN ADULT
1 SUPPOSITORY, RECTAL RECTAL ONCE
Refills: 0 | Status: DISCONTINUED | OUTPATIENT
Start: 2024-04-25 | End: 2024-04-25

## 2024-04-25 RX ORDER — FAMOTIDINE 10 MG/ML
1 INJECTION INTRAVENOUS
Qty: 30 | Refills: 0
Start: 2024-04-25 | End: 2024-05-24

## 2024-04-25 RX ORDER — DOXYLAMINE SUCCINATE AND PYRIDOXINE HYDROCHLORIDE, DELAYED RELEASE TABLETS 10 MG/10 MG 10; 10 MG/1; MG/1
1 TABLET, DELAYED RELEASE ORAL
Qty: 120 | Refills: 0
Start: 2024-04-25 | End: 2024-05-24

## 2024-04-25 RX ORDER — SODIUM CHLORIDE 9 MG/ML
1000 INJECTION, SOLUTION INTRAVENOUS ONCE
Refills: 0 | Status: DISCONTINUED | OUTPATIENT
Start: 2024-04-25 | End: 2024-05-09

## 2024-04-25 RX ORDER — FAMOTIDINE 10 MG/ML
20 INJECTION INTRAVENOUS ONCE
Refills: 0 | Status: COMPLETED | OUTPATIENT
Start: 2024-04-25 | End: 2024-04-25

## 2024-04-25 RX ORDER — SENNA PLUS 8.6 MG/1
2 TABLET ORAL
Qty: 60 | Refills: 3
Start: 2024-04-25 | End: 2024-08-22

## 2024-04-25 RX ORDER — DOXYLAMINE SUCCINATE 25 MG
1 TABLET ORAL
Qty: 30 | Refills: 3
Start: 2024-04-25 | End: 2024-08-22

## 2024-04-25 RX ADMIN — FAMOTIDINE 20 MILLIGRAM(S): 10 INJECTION INTRAVENOUS at 12:14

## 2024-04-25 RX ADMIN — Medication 1 ENEMA: at 14:31

## 2024-04-25 NOTE — OB RN TRIAGE NOTE - ABORTIONS, OB PROFILE
October 14, 2019      Amalia Travis MD  [address] [facility] [address]  [facility] [address]           [facility]  [address]  [facility] [address]  Phone: [phone]          Patient: Perlita Lopez   MR Number: [id number]   YOB: 2008   Date of Visit: 10/14/2019       Dear Dr. Amalia Travis:    Thank you for referring Perlita Lopez to me for evaluation. Attached you will find relevant portions of my assessment and plan of care.    If you have questions, please do not hesitate to call me. I look forward to following Perlita Lopez along with you.    Sincerely,    Jaswant Barnes MD    Enclosure  CC:  No Recipients    If you would like to receive this communication electronically, please contact [email or web address] or [phone] to request more information on [employer] access.    For providers and/or their staff who would like to refer a patient to [facility], please contact us through our one-stop-shop provider referral line, [facility] , at [phone].    If you feel you have received this communication in error or would no longer like to receive these types of communications, please e-mail [email or web address]        0

## 2024-04-25 NOTE — OB PROVIDER TRIAGE NOTE - NSHPLABSRESULTS_GEN_ALL_CORE
Complete Blood Count + Automated Diff (04.25.24 @ 11:55)    WBC Count: 7.49 K/uL    RBC Count: 3.54 M/uL    Hemoglobin: 10.4 g/dL    Hematocrit: 31.2 %    Mean Cell Volume: 88.1 fl    Mean Cell Hemoglobin: 29.4 pg    Mean Cell Hemoglobin Conc: 33.3 gm/dL    Red Cell Distrib Width: 12.6 %    Platelet Count - Automated: 184 K/uL    Auto Neutrophil #: 4.49 K/uL    Auto Lymphocyte #: 2.20 K/uL    Auto Monocyte #: 0.49 K/uL    Auto Eosinophil #: 0.25 K/uL    Auto Basophil #: 0.02 K/uL    Auto Neutrophil %: 60.0: Differential percentages must be correlated with absolute numbers for  clinical significance. %    Auto Lymphocyte %: 29.4 %    Auto Monocyte %: 6.5 %    Auto Eosinophil %: 3.3 %    Auto Basophil %: 0.3 %    Auto Immature Granulocyte %: 0.5: (Includes meta, myelo and promyelocytes). Mild elevations in immature  granulocytes may be seen with many inflammatory processes and pregnancy;  clinical correlation suggested. %    Nucleated RBC: 0 /100 WBCs    Comprehensive Metabolic Panel (04.25.24 @ 11:55)    Sodium: 135 mmol/L    Potassium: 3.9: Moderate Hemolysis, results may be falsely elevated mmol/L    Chloride: 105 mmol/L    Carbon Dioxide: 20 mmol/L    Anion Gap: 10 mmol/L    Blood Urea Nitrogen: 6 mg/dL    Creatinine: 0.45 mg/dL    Glucose: 80 mg/dL    Calcium: 8.4 mg/dL    Protein Total: 6.0 g/dL    Albumin: 3.4 g/dL    Bilirubin Total: 0.2 mg/dL    Alkaline Phosphatase: 32 U/L    Aspartate Aminotransferase (AST/SGOT): 27: Moderate Hemolysis, results may be falsely elevated U/L    Alanine Aminotransferase (ALT/SGPT): 15: Moderate Hemolysis, results may be falsely elevated U/L    eGFR: 130: The estimated glomerular filtration rate (eGFR) is calculated using the  2021 CKD-EPI creatinine equation, which does not have a coefficient for  race and is validated in individuals 18 years of age and older (N Engl J  Med 2021; 385:1119-7227). Creatinine-based eGFR may be inaccurate in  various situations including but not limited to extremes of muscle mass,  altered dietary protein intake, or medications that affect renal tubular  creatinine secretion. mL/min/1.73m2    Urine Microscopic-Add On (NC) (04.25.24 @ 12:08)    Red Blood Cell - Urine: 2 /HPF    White Blood Cell - Urine: 4 /HPF    Bacteria: Occasional /HPF    Epithelial Cells: 7 /HPF    Cast: 0 /LPF    Culture - Urine in AM (04.25.24 @ 12:08)    Specimen Source: Clean Catch Clean Catch (Midstream)    Culture Results:   <10,000 CFU/mL Normal Urogenital Tasha

## 2024-04-25 NOTE — OB PROVIDER TRIAGE NOTE - ATTENDING COMMENTS
Obstetrical  8am-6pm:  Patient neither seen nor examined by me.  Agree with above resident note.  Kwasi QUINTANA

## 2024-04-25 NOTE — OB PROVIDER TRIAGE NOTE - HISTORY OF PRESENT ILLNESS
33yo Moh[place] speaking,  at 16w5d p/w vaginal vs rectal bleeding. Reports she has been constipated and straining, and noticed fresh red blood s/p bowel movement, unsure if rectal or from vaginal. Endorses abdominal pain and cramping, but no contraction like patterns. Patient reports she has had difficulty with regular bowel movements but has not been on stool softners. Patient denies chest pain, shortness of breath, fevers, chills, nausea, vomiting, diarrhea.     POB:   6#8, uncomplicated per patient.  PGYN: Hx of ovarian cysts, denies fibroids, STD hx, or abnormal PAPs   PMH: Pituitary adenoma (s/p MRI with no acute findings per patient prior to pregnancy), pyelonephritis (outside of pregnancy)  PSH: Denies  SH: Denies EtOH, tobacco and illicit drug use during this pregnancy; feels safe at home   Psych Hx: denies hx of anxiety or depression  Meds: PNVs  Allergies: NKDA

## 2024-04-25 NOTE — OB PROVIDER TRIAGE NOTE - NSHPPHYSICALEXAM_GEN_ALL_CORE
Gen: NAD  Cards: RRR  Pulm: CTAB  Abd: soft, non-tender, gravid  Ext: warm, well perfused    SSE: physiological discharge, no bleeding noted in vault or through os w/ cough test  Rectal exam: no hemorrhoids appreciated, however impacted stool burden with severe pain on ANA MARÍA; fissure noted.    BSUS: ,  FM+, MVP3.3  TVUS: CL 4.2, no funneling on suprapubic pressure  VE: 0/0/-3  TOCO: quiet

## 2024-04-25 NOTE — ED PROVIDER NOTE - INTERNATIONAL TRAVEL
Problem: SUBSTANCE USE/ABUSE  Goal: By discharge, will develop insight into their chemical dependency and sustain motivation to continue in recovery  Description: INTERVENTIONS:  - Attends all daily group sessions and scheduled AA groups  - Actively practices coping skills through participation in the therapeutic community and adherence to program rules  - Reviews and completes assignments from individual treatment plan  - Assist patient development of understanding of their personal cycle of addiction and relapse triggers  Outcome: Progressing  Goal: By discharge, patient will have ongoing treatment plan addressing chemical dependency  Description: INTERVENTIONS:  - Assist patient with resources and/or appointments for ongoing recovery based living  Outcome: Progressing     Problem: Knowledge Deficit  Goal: Patient/family/caregiver demonstrates understanding of disease process, treatment plan, medications, and discharge instructions  Description: Complete learning assessment and assess knowledge base.  Interventions:  - Provide teaching at level of understanding  - Provide teaching via preferred learning methods  Outcome: Progressing      No

## 2024-04-25 NOTE — OB PROVIDER TRIAGE NOTE - NSICDXPASTMEDICALHX_GEN_ALL_CORE_FT
PAST MEDICAL HISTORY:  Bacterial UTI     Helicobacter pylori gastritis     Migraines     PCOS (polycystic ovarian syndrome)

## 2024-04-26 ENCOUNTER — APPOINTMENT (OUTPATIENT)
Dept: MATERNAL FETAL MEDICINE | Facility: CLINIC | Age: 34
End: 2024-04-26

## 2024-04-26 ENCOUNTER — APPOINTMENT (OUTPATIENT)
Dept: ANTEPARTUM | Facility: CLINIC | Age: 34
End: 2024-04-26

## 2024-04-27 LAB
CULTURE RESULTS: SIGNIFICANT CHANGE UP
SPECIMEN SOURCE: SIGNIFICANT CHANGE UP

## 2024-05-02 ENCOUNTER — NON-APPOINTMENT (OUTPATIENT)
Age: 34
End: 2024-05-02

## 2024-05-03 ENCOUNTER — APPOINTMENT (OUTPATIENT)
Dept: MATERNAL FETAL MEDICINE | Facility: CLINIC | Age: 34
End: 2024-05-03
Payer: MEDICAID

## 2024-05-03 ENCOUNTER — OUTPATIENT (OUTPATIENT)
Dept: OUTPATIENT SERVICES | Facility: HOSPITAL | Age: 34
LOS: 1 days | End: 2024-05-03
Payer: MEDICAID

## 2024-05-03 ENCOUNTER — ASOB RESULT (OUTPATIENT)
Age: 34
End: 2024-05-03

## 2024-05-03 ENCOUNTER — APPOINTMENT (OUTPATIENT)
Dept: ANTEPARTUM | Facility: CLINIC | Age: 34
End: 2024-05-03
Payer: MEDICAID

## 2024-05-03 VITALS
HEIGHT: 60 IN | HEART RATE: 78 BPM | WEIGHT: 138.5 LBS | OXYGEN SATURATION: 99 % | BODY MASS INDEX: 27.19 KG/M2 | SYSTOLIC BLOOD PRESSURE: 100 MMHG | DIASTOLIC BLOOD PRESSURE: 64 MMHG

## 2024-05-03 DIAGNOSIS — O09.899 SUPERVISION OF OTHER HIGH RISK PREGNANCIES, UNSPECIFIED TRIMESTER: ICD-10-CM

## 2024-05-03 DIAGNOSIS — O24.419 GESTATIONAL DIABETES MELLITUS IN PREGNANCY, UNSPECIFIED CONTROL: ICD-10-CM

## 2024-05-03 DIAGNOSIS — O21.9 VOMITING OF PREGNANCY, UNSPECIFIED: ICD-10-CM

## 2024-05-03 DIAGNOSIS — O09.90 SUPERVISION OF HIGH RISK PREGNANCY, UNSPECIFIED, UNSPECIFIED TRIMESTER: ICD-10-CM

## 2024-05-03 DIAGNOSIS — R51.9 HEADACHE, UNSPECIFIED: ICD-10-CM

## 2024-05-03 LAB
BILIRUB UR QL STRIP: NORMAL
COLLECTION METHOD: NORMAL
GLUCOSE UR-MCNC: NORMAL
HCG UR QL: 0.2 EU/DL
HGB UR QL STRIP.AUTO: NORMAL
KETONES UR-MCNC: NORMAL
LEUKOCYTE ESTERASE UR QL STRIP: NORMAL
NITRITE UR QL STRIP: NORMAL
PH UR STRIP: 6
PROT UR STRIP-MCNC: NORMAL
SP GR UR STRIP: 1.03

## 2024-05-03 PROCEDURE — 99203 OFFICE O/P NEW LOW 30 MIN: CPT | Mod: TH,GC,25

## 2024-05-03 PROCEDURE — G0463: CPT

## 2024-05-03 PROCEDURE — 81003 URINALYSIS AUTO W/O SCOPE: CPT

## 2024-05-03 PROCEDURE — 76805 OB US >/= 14 WKS SNGL FETUS: CPT | Mod: 26

## 2024-05-03 PROCEDURE — G0108: CPT

## 2024-05-06 ENCOUNTER — APPOINTMENT (OUTPATIENT)
Dept: INFECTIOUS DISEASE | Facility: CLINIC | Age: 34
End: 2024-05-06

## 2024-05-07 DIAGNOSIS — K59.00 DISEASES OF THE DIGESTIVE SYSTEM COMPLICATING PREGNANCY, UNSPECIFIED TRIMESTER: ICD-10-CM

## 2024-05-07 DIAGNOSIS — O99.619 DISEASES OF THE DIGESTIVE SYSTEM COMPLICATING PREGNANCY, UNSPECIFIED TRIMESTER: ICD-10-CM

## 2024-05-07 RX ORDER — SENNOSIDES 8.6 MG TABLETS 8.6 MG/1
8.6 TABLET ORAL
Qty: 60 | Refills: 0 | Status: ACTIVE | COMMUNITY
Start: 2024-05-07 | End: 1900-01-01

## 2024-05-14 ENCOUNTER — NON-APPOINTMENT (OUTPATIENT)
Age: 34
End: 2024-05-14

## 2024-05-17 ENCOUNTER — ASOB RESULT (OUTPATIENT)
Age: 34
End: 2024-05-17

## 2024-05-17 ENCOUNTER — LABORATORY RESULT (OUTPATIENT)
Age: 34
End: 2024-05-17

## 2024-05-17 ENCOUNTER — APPOINTMENT (OUTPATIENT)
Dept: ANTEPARTUM | Facility: CLINIC | Age: 34
End: 2024-05-17
Payer: MEDICAID

## 2024-05-17 ENCOUNTER — NON-APPOINTMENT (OUTPATIENT)
Age: 34
End: 2024-05-17

## 2024-05-17 ENCOUNTER — OUTPATIENT (OUTPATIENT)
Dept: OUTPATIENT SERVICES | Facility: HOSPITAL | Age: 34
LOS: 1 days | End: 2024-05-17
Payer: MEDICAID

## 2024-05-17 ENCOUNTER — APPOINTMENT (OUTPATIENT)
Dept: MATERNAL FETAL MEDICINE | Facility: CLINIC | Age: 34
End: 2024-05-17
Payer: MEDICAID

## 2024-05-17 VITALS
HEIGHT: 60 IN | WEIGHT: 140.25 LBS | OXYGEN SATURATION: 99 % | SYSTOLIC BLOOD PRESSURE: 100 MMHG | HEART RATE: 70 BPM | BODY MASS INDEX: 27.54 KG/M2 | DIASTOLIC BLOOD PRESSURE: 70 MMHG

## 2024-05-17 DIAGNOSIS — B37.31 ACUTE CANDIDIASIS OF VULVA AND VAGINA: ICD-10-CM

## 2024-05-17 DIAGNOSIS — O09.899 SUPERVISION OF OTHER HIGH RISK PREGNANCIES, UNSPECIFIED TRIMESTER: ICD-10-CM

## 2024-05-17 DIAGNOSIS — O09.90 SUPERVISION OF HIGH RISK PREGNANCY, UNSPECIFIED, UNSPECIFIED TRIMESTER: ICD-10-CM

## 2024-05-17 DIAGNOSIS — O09.299 SUPERVISION OF PREGNANCY WITH OTHER POOR REPRODUCTIVE OR OBSTETRIC HISTORY, UNSPECIFIED TRIMESTER: ICD-10-CM

## 2024-05-17 LAB
ALBUMIN SERPL ELPH-MCNC: 4 G/DL — SIGNIFICANT CHANGE UP (ref 3.3–5)
ALP SERPL-CCNC: 47 U/L — SIGNIFICANT CHANGE UP (ref 40–120)
ALT FLD-CCNC: 28 U/L — SIGNIFICANT CHANGE UP (ref 10–45)
ANION GAP SERPL CALC-SCNC: 13 MMOL/L — SIGNIFICANT CHANGE UP (ref 5–17)
AST SERPL-CCNC: 22 U/L — SIGNIFICANT CHANGE UP (ref 10–40)
BILIRUB SERPL-MCNC: <0.2 MG/DL — SIGNIFICANT CHANGE UP (ref 0.2–1.2)
BILIRUB UR QL STRIP: NORMAL
BUN SERPL-MCNC: 6 MG/DL — LOW (ref 7–23)
CALCIUM SERPL-MCNC: 9.1 MG/DL — SIGNIFICANT CHANGE UP (ref 8.4–10.5)
CHLORIDE SERPL-SCNC: 103 MMOL/L — SIGNIFICANT CHANGE UP (ref 96–108)
CLARITY UR: CLEAR
CO2 SERPL-SCNC: 20 MMOL/L — LOW (ref 22–31)
COLLECTION METHOD: NORMAL
CREAT ?TM UR-MCNC: 161 MG/DL — SIGNIFICANT CHANGE UP
CREAT SERPL-MCNC: 0.58 MG/DL — SIGNIFICANT CHANGE UP (ref 0.5–1.3)
EGFR: 122 ML/MIN/1.73M2 — SIGNIFICANT CHANGE UP
GLUCOSE SERPL-MCNC: 79 MG/DL — SIGNIFICANT CHANGE UP (ref 70–99)
GLUCOSE UR-MCNC: NORMAL
HCG UR QL: NORMAL EU/DL
HGB UR QL STRIP.AUTO: NORMAL
KETONES UR-MCNC: NORMAL
LDH SERPL L TO P-CCNC: 141 U/L — SIGNIFICANT CHANGE UP (ref 50–242)
LEUKOCYTE ESTERASE UR QL STRIP: NORMAL
NITRITE UR QL STRIP: NORMAL
PH UR STRIP: 7.5
POTASSIUM SERPL-MCNC: 3.9 MMOL/L — SIGNIFICANT CHANGE UP (ref 3.5–5.3)
POTASSIUM SERPL-SCNC: 3.9 MMOL/L — SIGNIFICANT CHANGE UP (ref 3.5–5.3)
PROT ?TM UR-MCNC: 10 MG/DL — SIGNIFICANT CHANGE UP (ref 0–12)
PROT SERPL-MCNC: 6.7 G/DL — SIGNIFICANT CHANGE UP (ref 6–8.3)
PROT UR STRIP-MCNC: NORMAL
PROT/CREAT UR-RTO: 0.1 RATIO — SIGNIFICANT CHANGE UP (ref 0–0.2)
SODIUM SERPL-SCNC: 136 MMOL/L — SIGNIFICANT CHANGE UP (ref 135–145)
SP GR UR STRIP: 1.02
URATE SERPL-MCNC: 2.9 MG/DL — SIGNIFICANT CHANGE UP (ref 2.5–7)

## 2024-05-17 PROCEDURE — 99213 OFFICE O/P EST LOW 20 MIN: CPT | Mod: TH,GC,25

## 2024-05-17 PROCEDURE — G0463: CPT

## 2024-05-17 PROCEDURE — 76811 OB US DETAILED SNGL FETUS: CPT | Mod: 26

## 2024-05-17 PROCEDURE — 36415 COLL VENOUS BLD VENIPUNCTURE: CPT

## 2024-05-17 PROCEDURE — 87491 CHLMYD TRACH DNA AMP PROBE: CPT

## 2024-05-17 PROCEDURE — 83615 LACTATE (LD) (LDH) ENZYME: CPT

## 2024-05-17 PROCEDURE — 87591 N.GONORRHOEAE DNA AMP PROB: CPT

## 2024-05-17 PROCEDURE — 82105 ALPHA-FETOPROTEIN SERUM: CPT

## 2024-05-17 PROCEDURE — 84156 ASSAY OF PROTEIN URINE: CPT

## 2024-05-17 PROCEDURE — 84550 ASSAY OF BLOOD/URIC ACID: CPT

## 2024-05-17 PROCEDURE — 87086 URINE CULTURE/COLONY COUNT: CPT

## 2024-05-17 PROCEDURE — 82570 ASSAY OF URINE CREATININE: CPT

## 2024-05-17 PROCEDURE — 81003 URINALYSIS AUTO W/O SCOPE: CPT

## 2024-05-17 PROCEDURE — 80053 COMPREHEN METABOLIC PANEL: CPT

## 2024-05-17 RX ORDER — ONDANSETRON 4 MG/1
4 TABLET ORAL
Qty: 60 | Refills: 1 | Status: DISCONTINUED | COMMUNITY
Start: 2024-05-03 | End: 2024-05-17

## 2024-05-17 RX ORDER — PYRIDOXINE HCL (VITAMIN B6) 25 MG
25 TABLET ORAL
Qty: 60 | Refills: 2 | Status: ACTIVE | COMMUNITY
Start: 2024-05-17 | End: 1900-01-01

## 2024-05-17 RX ORDER — ONDANSETRON 4 MG/1
4 TABLET, ORALLY DISINTEGRATING ORAL
Qty: 60 | Refills: 1 | Status: ACTIVE | COMMUNITY
Start: 2024-05-17 | End: 1900-01-01

## 2024-05-17 RX ORDER — TERCONAZOLE 8 MG/G
0.8 CREAM VAGINAL
Qty: 1 | Refills: 0 | Status: COMPLETED | COMMUNITY
Start: 2024-05-17 | End: 2024-05-20

## 2024-05-17 RX ORDER — MAGNESIUM OXIDE 400 MG
400 CAPSULE ORAL
Qty: 30 | Refills: 3 | Status: ACTIVE | COMMUNITY
Start: 2024-05-03 | End: 1900-01-01

## 2024-05-18 LAB
CULTURE RESULTS: SIGNIFICANT CHANGE UP
SPECIMEN SOURCE: SIGNIFICANT CHANGE UP

## 2024-05-19 ENCOUNTER — NON-APPOINTMENT (OUTPATIENT)
Age: 34
End: 2024-05-19

## 2024-05-19 LAB
CULTURE RESULTS: SIGNIFICANT CHANGE UP
SPECIMEN SOURCE: SIGNIFICANT CHANGE UP

## 2024-05-20 ENCOUNTER — NON-APPOINTMENT (OUTPATIENT)
Age: 34
End: 2024-05-20

## 2024-05-20 DIAGNOSIS — Z87.59 PERSONAL HISTORY OF OTHER COMPLICATIONS OF PREGNANCY, CHILDBIRTH AND THE PUERPERIUM: ICD-10-CM

## 2024-05-20 DIAGNOSIS — O09.90 SUPERVISION OF HIGH RISK PREGNANCY, UNSPECIFIED, UNSPECIFIED TRIMESTER: ICD-10-CM

## 2024-05-20 DIAGNOSIS — B37.31 ACUTE CANDIDIASIS OF VULVA AND VAGINA: ICD-10-CM

## 2024-05-20 DIAGNOSIS — O09.299 SUPERVISION OF PREGNANCY WITH OTHER POOR REPRODUCTIVE OR OBSTETRIC HISTORY, UNSPECIFIED TRIMESTER: ICD-10-CM

## 2024-05-20 LAB
C TRACH RRNA SPEC QL NAA+PROBE: SIGNIFICANT CHANGE UP
N GONORRHOEA RRNA SPEC QL NAA+PROBE: SIGNIFICANT CHANGE UP
SPECIMEN SOURCE: SIGNIFICANT CHANGE UP

## 2024-05-21 LAB
AFP ADJ MOM SERPL: 1.24 — SIGNIFICANT CHANGE UP
AFP INTERP SERPL-IMP: SIGNIFICANT CHANGE UP
AFP INTERP SERPL-IMP: SIGNIFICANT CHANGE UP
AFP SERPL-MCNC: 75 NG/ML — SIGNIFICANT CHANGE UP
AGE AT DELIVERY: 33.4 YR — SIGNIFICANT CHANGE UP
ALPHA FETOPROTEIN SERUM COMMENT: SIGNIFICANT CHANGE UP
ALPHA FETOPROTEIN SERUM RESULTS: SIGNIFICANT CHANGE UP
GA METHOD: SIGNIFICANT CHANGE UP
GA: 19.9 WEEKS — SIGNIFICANT CHANGE UP
IDDM PATIENT QL: NO — SIGNIFICANT CHANGE UP
MULTIPLE PREGNANCY: NO — SIGNIFICANT CHANGE UP
NEURAL TUBE DEFECT RISK FETUS: 5748 — SIGNIFICANT CHANGE UP
RACE AFP: SIGNIFICANT CHANGE UP

## 2024-05-22 PROBLEM — N39.0 URINARY TRACT INFECTION, SITE NOT SPECIFIED: Chronic | Status: ACTIVE | Noted: 2024-04-02

## 2024-05-22 PROBLEM — K29.70 GASTRITIS, UNSPECIFIED, WITHOUT BLEEDING: Chronic | Status: ACTIVE | Noted: 2023-09-16

## 2024-05-22 PROBLEM — E28.2 POLYCYSTIC OVARIAN SYNDROME: Chronic | Status: ACTIVE | Noted: 2023-09-16

## 2024-05-22 PROBLEM — G43.909 MIGRAINE, UNSPECIFIED, NOT INTRACTABLE, WITHOUT STATUS MIGRAINOSUS: Chronic | Status: ACTIVE | Noted: 2024-02-07

## 2024-05-24 ENCOUNTER — NON-APPOINTMENT (OUTPATIENT)
Age: 34
End: 2024-05-24

## 2024-05-24 ENCOUNTER — OUTPATIENT (OUTPATIENT)
Dept: OUTPATIENT SERVICES | Facility: HOSPITAL | Age: 34
LOS: 1 days | End: 2024-05-24
Payer: MEDICAID

## 2024-05-24 ENCOUNTER — APPOINTMENT (OUTPATIENT)
Dept: ANTEPARTUM | Facility: CLINIC | Age: 34
End: 2024-05-24
Payer: MEDICAID

## 2024-05-24 ENCOUNTER — ASOB RESULT (OUTPATIENT)
Age: 34
End: 2024-05-24

## 2024-05-24 PROCEDURE — 76816 OB US FOLLOW-UP PER FETUS: CPT

## 2024-05-24 PROCEDURE — 76816 OB US FOLLOW-UP PER FETUS: CPT | Mod: 26

## 2024-05-31 ENCOUNTER — APPOINTMENT (OUTPATIENT)
Dept: GASTROENTEROLOGY | Facility: CLINIC | Age: 34
End: 2024-05-31
Payer: MEDICAID

## 2024-05-31 VITALS
OXYGEN SATURATION: 97 % | BODY MASS INDEX: 28.13 KG/M2 | DIASTOLIC BLOOD PRESSURE: 68 MMHG | WEIGHT: 143.3 LBS | HEIGHT: 60 IN | HEART RATE: 86 BPM | SYSTOLIC BLOOD PRESSURE: 108 MMHG

## 2024-05-31 DIAGNOSIS — K59.09 OTHER CONSTIPATION: ICD-10-CM

## 2024-05-31 DIAGNOSIS — O21.0 MILD HYPEREMESIS GRAVIDARUM: ICD-10-CM

## 2024-05-31 DIAGNOSIS — R12 HEARTBURN: ICD-10-CM

## 2024-05-31 DIAGNOSIS — Z34.90 ENCOUNTER FOR SUPERVISION OF NORMAL PREGNANCY, UNSPECIFIED, UNSPECIFIED TRIMESTER: ICD-10-CM

## 2024-05-31 PROCEDURE — G2211 COMPLEX E/M VISIT ADD ON: CPT | Mod: NC

## 2024-05-31 PROCEDURE — 99204 OFFICE O/P NEW MOD 45 MIN: CPT

## 2024-05-31 NOTE — ASSESSMENT
[FreeTextEntry1] : Pt. is a 33y/o F with PMH of h pylori (diagnosed twice, was treated with antibiotics and she was resistant, a second course of antibiotics cured her)., who is currently pregnant who is here for evaluation of heartburn and chronic constipation, she had some hyperemesis symptoms previously for which she is taking zofran.  We discussed how she can increase her water intake to 2L/day, she was advised to stop the senna and switch to miralax daily as needed to help with her constipation. She will try this regimen and see if it helps her constipation.  She denies family history of colon cancer or other issues. For her reflux type symptoms, she can take famotidine as needed for her symptoms, she was advised lifestyle modifications such as avoiding eating within 3 hours of lying down, avoiding spicy food and tomato-based foods. We will retest for h pylori  and if positive she will need treatment.   Silvestre Chavez MD [facility]

## 2024-05-31 NOTE — HISTORY OF PRESENT ILLNESS
[FreeTextEntry1] : Pt. is a 35y/o F who is 20 weeks pregnant and here for constipation. She reports she moves her bowels once every 2-5 days with hard stools. She has been taking senna without too much help. She had problems with vomiting earlier in the pregnancy but now it is getting better, she is taking zofran daily for her vomiting.  She denies family history of cancer.

## 2024-05-31 NOTE — PHYSICAL EXAM
[Normal] : normal bowel sounds, non-tender, no masses, soft, no no hepato-splenomegaly [de-identified] : pregnant

## 2024-06-03 ENCOUNTER — NON-APPOINTMENT (OUTPATIENT)
Age: 34
End: 2024-06-03

## 2024-06-07 ENCOUNTER — LABORATORY RESULT (OUTPATIENT)
Age: 34
End: 2024-06-07

## 2024-06-07 ENCOUNTER — OUTPATIENT (OUTPATIENT)
Dept: OUTPATIENT SERVICES | Facility: HOSPITAL | Age: 34
LOS: 1 days | End: 2024-06-07
Payer: MEDICAID

## 2024-06-07 ENCOUNTER — NON-APPOINTMENT (OUTPATIENT)
Age: 34
End: 2024-06-07

## 2024-06-07 ENCOUNTER — APPOINTMENT (OUTPATIENT)
Dept: MATERNAL FETAL MEDICINE | Facility: CLINIC | Age: 34
End: 2024-06-07
Payer: MEDICAID

## 2024-06-07 VITALS
OXYGEN SATURATION: 99 % | BODY MASS INDEX: 28.91 KG/M2 | WEIGHT: 147.25 LBS | HEART RATE: 70 BPM | SYSTOLIC BLOOD PRESSURE: 96 MMHG | DIASTOLIC BLOOD PRESSURE: 60 MMHG | HEIGHT: 60 IN

## 2024-06-07 DIAGNOSIS — O09.899 SUPERVISION OF OTHER HIGH RISK PREGNANCIES, UNSPECIFIED TRIMESTER: ICD-10-CM

## 2024-06-07 DIAGNOSIS — O09.90 SUPERVISION OF HIGH RISK PREGNANCY, UNSPECIFIED, UNSPECIFIED TRIMESTER: ICD-10-CM

## 2024-06-07 LAB
BILIRUB UR QL STRIP: NORMAL
COLLECTION METHOD: NORMAL
GLUCOSE UR-MCNC: NORMAL
HCG UR QL: 0.2 EU/DL
HGB UR QL STRIP.AUTO: NORMAL
KETONES UR-MCNC: NORMAL
LEUKOCYTE ESTERASE UR QL STRIP: NORMAL
NITRITE UR QL STRIP: NORMAL
PH UR STRIP: 6
PROT UR STRIP-MCNC: NORMAL
SP GR UR STRIP: 1.01

## 2024-06-07 PROCEDURE — G0463: CPT

## 2024-06-07 PROCEDURE — 99213 OFFICE O/P EST LOW 20 MIN: CPT | Mod: TH,25,GC

## 2024-06-07 PROCEDURE — 87086 URINE CULTURE/COLONY COUNT: CPT

## 2024-06-07 PROCEDURE — 81003 URINALYSIS AUTO W/O SCOPE: CPT

## 2024-06-08 LAB
CULTURE RESULTS: NO GROWTH — SIGNIFICANT CHANGE UP
SPECIMEN SOURCE: SIGNIFICANT CHANGE UP

## 2024-06-12 RX ORDER — CEPHALEXIN 500 MG/1
500 TABLET ORAL
Qty: 30 | Refills: 4 | Status: COMPLETED | COMMUNITY
Start: 2024-05-03 | End: 2024-06-12

## 2024-06-12 RX ORDER — NITROFURANTOIN (MONOHYDRATE/MACROCRYSTALS) 25; 75 MG/1; MG/1
100 CAPSULE ORAL
Qty: 30 | Refills: 3 | Status: ACTIVE | COMMUNITY
Start: 2024-06-12 | End: 1900-01-01

## 2024-06-13 ENCOUNTER — NON-APPOINTMENT (OUTPATIENT)
Age: 34
End: 2024-06-13

## 2024-06-18 ENCOUNTER — OUTPATIENT (OUTPATIENT)
Dept: OUTPATIENT SERVICES | Facility: HOSPITAL | Age: 34
LOS: 1 days | End: 2024-06-18
Payer: MEDICAID

## 2024-06-18 ENCOUNTER — APPOINTMENT (OUTPATIENT)
Dept: NEUROLOGY | Facility: HOSPITAL | Age: 34
End: 2024-06-18
Payer: MEDICAID

## 2024-06-18 VITALS
OXYGEN SATURATION: 98 % | HEART RATE: 76 BPM | HEIGHT: 60 IN | WEIGHT: 143.3 LBS | TEMPERATURE: 98 F | SYSTOLIC BLOOD PRESSURE: 93 MMHG | BODY MASS INDEX: 28.13 KG/M2 | RESPIRATION RATE: 14 BRPM | DIASTOLIC BLOOD PRESSURE: 62 MMHG

## 2024-06-18 DIAGNOSIS — R51.9 HEADACHE, UNSPECIFIED: ICD-10-CM

## 2024-06-18 DIAGNOSIS — G44.89 OTHER HEADACHE SYNDROME: ICD-10-CM

## 2024-06-18 DIAGNOSIS — Z71.9 COUNSELING, UNSPECIFIED: ICD-10-CM

## 2024-06-18 PROCEDURE — 99204 OFFICE O/P NEW MOD 45 MIN: CPT

## 2024-06-18 PROCEDURE — G0463: CPT

## 2024-06-18 RX ORDER — LORATADINE 5 MG
17 TABLET,CHEWABLE ORAL
Refills: 0 | Status: ACTIVE | COMMUNITY
Start: 2024-06-18

## 2024-06-18 RX ORDER — NORETHINDRONE ACETATE AND ETHINYL ESTRADIOL, AND FERROUS FUMARATE 1MG-20(24)
1-20 KIT ORAL DAILY
Qty: 3 | Refills: 0 | Status: DISCONTINUED | COMMUNITY
Start: 2024-01-17 | End: 2024-06-18

## 2024-06-18 NOTE — HISTORY OF PRESENT ILLNESS
[FreeTextEntry1] : 34 y.o. 20 wk pregnant F w/ no significant medical history presents due to a headache. Patient reports she's had a chronic headache since around the age of 16. She reports that there is a constant dull pain in her head with occasional periods of worsening. The location varies, sometimes hemicrainal on either side, sometimes holocephallic, sometimes bi-frontal. Patient is 20 wk pregnant and states that her headache is been more intense and has been worsening more frequently since her pregnancy began. The headache is associated with photophobia, phonophobia and nausea. When the patient bends down to pick anything up from the ground, the headache worsens and she has a brief transient period of blurry vision. Patient also reports she has been feeling dizzy when driving in cars lately.   Patient denies current dizziness, slurred speech, hearing changes, focal weakness or sensory changes.

## 2024-06-18 NOTE — END OF VISIT
[] : Resident [FreeTextEntry3] : HAs since young age, more frequent now than prior but still intermittent.    completely normal neuro exam. specifically, no papilledema, vff, perrl, and eomi  MRI brain reviewed - mildly enlarged pituitary but no significant mass effect.  TSH recently wnl  AP: likely migrainous. MRI was unrevealing for a cause of HAs. Mild enlargement of pituitary should be evaluated w a dedicated pituitary scan, but this can wait until after pregnancy as it would not  prior to pregnancy. Thyroid function normal. she should f/u with her OB/pmd and Rtc after pregnancy with plan for a dedicated pituitary MRI. all questions answered.  [Time Spent: ___ minutes] : I have spent [unfilled] minutes of time on the encounter.

## 2024-06-18 NOTE — PHYSICAL EXAM
[General Appearance - Alert] : alert [General Appearance - Well Nourished] : well nourished [Oriented To Time, Place, And Person] : oriented to person, place, and time [Person] : oriented to person [Place] : oriented to place [Time] : oriented to time [Naming Objects] : no difficulty naming common objects [Cranial Nerves Optic (II)] : visual acuity intact bilaterally,  visual fields full to confrontation, pupils equal round and reactive to light [Cranial Nerves Oculomotor (III)] : extraocular motion intact [Cranial Nerves Trigeminal (V)] : facial sensation intact symmetrically [Cranial Nerves Facial (VII)] : face symmetrical [Cranial Nerves Vestibulocochlear (VIII)] : hearing was intact bilaterally [Cranial Nerves Glossopharyngeal (IX)] : tongue and palate midline [Cranial Nerves Accessory (XI - Cranial And Spinal)] : head turning and shoulder shrug symmetric [Cranial Nerves Hypoglossal (XII)] : there was no tongue deviation with protrusion [Motor Tone] : muscle tone was normal in all four extremities [Sensation Tactile Decrease] : light touch was intact [Sensation Vibration Decrease] : vibration was intact [Abnormal Walk] : normal gait [Balance] : balance was intact [PERRL With Normal Accommodation] : pupils were equal in size, round, reactive to light, with normal accommodation [Extraocular Movements] : extraocular movements were intact [Optic Disc Abnormality] : the optic disc were normal in size and color [No APD] : no afferent pupillary defect [Outer Ear] : the ears and nose were normal in appearance [Hearing Threshold Finger Rub Not Tift] : hearing was normal [Neck Appearance] : the appearance of the neck was normal [] : no respiratory distress [Paresis Pronator Drift Right-Sided] : no pronator drift on the right [Paresis Pronator Drift Left-Sided] : no pronator drift on the left [Motor Strength Upper Extremities Bilaterally] : strength was normal in both upper extremities [Motor Strength Lower Extremities Bilaterally] : strength was normal in both lower extremities

## 2024-06-18 NOTE — DISCUSSION/SUMMARY
[FreeTextEntry1] : 34 y.o 20 wk pregnant F with intermittent headache with associated photophobia, phonophobia, nausea. Worse since onset of pregnancy. Episodes of blurry vision and worsened headache when bending forward. No papilledema, outpatient MRI 2 months ago with slightly enlarged pituitary gland.  Impression: With history of similar headaches, photo/phonophobia nausea, likely migraines.  Recommendation:  [] MRI pituitary gland with & without contrast after pregnancy [] Patient advised to take Tylenol 600 mg PRN up to 2 - 3 x per week for headache RTC after pregnancy, sooner prn

## 2024-06-18 NOTE — REVIEW OF SYSTEMS
[Negative] : Respiratory [Fever] : no fever [Confused or Disoriented] : no confusion [Memory Lapses or Loss] : no memory loss [Decr. Concentrating Ability] : no decrease in concentrating ability [Difficulty with Language] : no ~M difficulty with language [Changed Thought Patterns] : no change in thought patterns [Repeating Questions] : no repeated questioning about recent events [Facial Weakness] : no facial weakness [Arm Weakness] : no arm weakness [Hand Weakness] : no hand weakness [Leg Weakness] : no leg weakness [Numbness] : no numbness [Tingling] : no tingling [Abnormal Sensation] : no abnormal sensation [Hypersensitivity] : no hypersensitivity

## 2024-06-20 ENCOUNTER — APPOINTMENT (OUTPATIENT)
Dept: ANTEPARTUM | Facility: CLINIC | Age: 34
End: 2024-06-20
Payer: MEDICAID

## 2024-06-20 ENCOUNTER — OUTPATIENT (OUTPATIENT)
Dept: OUTPATIENT SERVICES | Facility: HOSPITAL | Age: 34
LOS: 1 days | End: 2024-06-20
Payer: MEDICAID

## 2024-06-20 ENCOUNTER — NON-APPOINTMENT (OUTPATIENT)
Age: 34
End: 2024-06-20

## 2024-06-20 ENCOUNTER — ASOB RESULT (OUTPATIENT)
Age: 34
End: 2024-06-20

## 2024-06-20 ENCOUNTER — RESULT REVIEW (OUTPATIENT)
Age: 34
End: 2024-06-20

## 2024-06-20 VITALS
HEART RATE: 74 BPM | SYSTOLIC BLOOD PRESSURE: 91 MMHG | OXYGEN SATURATION: 100 % | RESPIRATION RATE: 18 BRPM | DIASTOLIC BLOOD PRESSURE: 55 MMHG | TEMPERATURE: 99 F

## 2024-06-20 VITALS — DIASTOLIC BLOOD PRESSURE: 55 MMHG | SYSTOLIC BLOOD PRESSURE: 92 MMHG | HEART RATE: 75 BPM

## 2024-06-20 DIAGNOSIS — O26.899 OTHER SPECIFIED PREGNANCY RELATED CONDITIONS, UNSPECIFIED TRIMESTER: ICD-10-CM

## 2024-06-20 LAB
ALBUMIN SERPL ELPH-MCNC: 3.5 G/DL — SIGNIFICANT CHANGE UP (ref 3.3–5)
ALP SERPL-CCNC: 55 U/L — SIGNIFICANT CHANGE UP (ref 40–120)
ALT FLD-CCNC: 24 U/L — SIGNIFICANT CHANGE UP (ref 10–45)
ANION GAP SERPL CALC-SCNC: 15 MMOL/L — SIGNIFICANT CHANGE UP (ref 5–17)
APPEARANCE UR: CLEAR — SIGNIFICANT CHANGE UP
AST SERPL-CCNC: 19 U/L — SIGNIFICANT CHANGE UP (ref 10–40)
BACTERIA # UR AUTO: ABNORMAL /HPF
BASOPHILS # BLD AUTO: 0.02 K/UL — SIGNIFICANT CHANGE UP (ref 0–0.2)
BASOPHILS NFR BLD AUTO: 0.2 % — SIGNIFICANT CHANGE UP (ref 0–2)
BILIRUB SERPL-MCNC: 0.1 MG/DL — LOW (ref 0.2–1.2)
BILIRUB UR-MCNC: NEGATIVE — SIGNIFICANT CHANGE UP
BUN SERPL-MCNC: 7 MG/DL — SIGNIFICANT CHANGE UP (ref 7–23)
CALCIUM SERPL-MCNC: 8.8 MG/DL — SIGNIFICANT CHANGE UP (ref 8.4–10.5)
CAST: 0 /LPF — SIGNIFICANT CHANGE UP (ref 0–4)
CHLORIDE SERPL-SCNC: 105 MMOL/L — SIGNIFICANT CHANGE UP (ref 96–108)
CO2 SERPL-SCNC: 18 MMOL/L — LOW (ref 22–31)
COLOR SPEC: YELLOW — SIGNIFICANT CHANGE UP
CREAT SERPL-MCNC: 0.46 MG/DL — LOW (ref 0.5–1.3)
DIFF PNL FLD: NEGATIVE — SIGNIFICANT CHANGE UP
EGFR: 130 ML/MIN/1.73M2 — SIGNIFICANT CHANGE UP
EOSINOPHIL # BLD AUTO: 0.33 K/UL — SIGNIFICANT CHANGE UP (ref 0–0.5)
EOSINOPHIL NFR BLD AUTO: 3.2 % — SIGNIFICANT CHANGE UP (ref 0–6)
GLUCOSE SERPL-MCNC: 91 MG/DL — SIGNIFICANT CHANGE UP (ref 70–99)
GLUCOSE UR QL: NEGATIVE MG/DL — SIGNIFICANT CHANGE UP
HCT VFR BLD CALC: 31.2 % — LOW (ref 34.5–45)
HGB BLD-MCNC: 10.6 G/DL — LOW (ref 11.5–15.5)
IMM GRANULOCYTES NFR BLD AUTO: 0.9 % — SIGNIFICANT CHANGE UP (ref 0–0.9)
KETONES UR-MCNC: NEGATIVE MG/DL — SIGNIFICANT CHANGE UP
LEUKOCYTE ESTERASE UR-ACNC: ABNORMAL
LYMPHOCYTES # BLD AUTO: 2.61 K/UL — SIGNIFICANT CHANGE UP (ref 1–3.3)
LYMPHOCYTES # BLD AUTO: 25 % — SIGNIFICANT CHANGE UP (ref 13–44)
MCHC RBC-ENTMCNC: 30.3 PG — SIGNIFICANT CHANGE UP (ref 27–34)
MCHC RBC-ENTMCNC: 34 GM/DL — SIGNIFICANT CHANGE UP (ref 32–36)
MCV RBC AUTO: 89.1 FL — SIGNIFICANT CHANGE UP (ref 80–100)
MONOCYTES # BLD AUTO: 0.82 K/UL — SIGNIFICANT CHANGE UP (ref 0–0.9)
MONOCYTES NFR BLD AUTO: 7.9 % — SIGNIFICANT CHANGE UP (ref 2–14)
NEUTROPHILS # BLD AUTO: 6.56 K/UL — SIGNIFICANT CHANGE UP (ref 1.8–7.4)
NEUTROPHILS NFR BLD AUTO: 62.8 % — SIGNIFICANT CHANGE UP (ref 43–77)
NITRITE UR-MCNC: NEGATIVE — SIGNIFICANT CHANGE UP
NRBC # BLD: 0 /100 WBCS — SIGNIFICANT CHANGE UP (ref 0–0)
PH UR: 6.5 — SIGNIFICANT CHANGE UP (ref 5–8)
PLATELET # BLD AUTO: 235 K/UL — SIGNIFICANT CHANGE UP (ref 150–400)
POTASSIUM SERPL-MCNC: 3.9 MMOL/L — SIGNIFICANT CHANGE UP (ref 3.5–5.3)
POTASSIUM SERPL-SCNC: 3.9 MMOL/L — SIGNIFICANT CHANGE UP (ref 3.5–5.3)
PROT SERPL-MCNC: 6.4 G/DL — SIGNIFICANT CHANGE UP (ref 6–8.3)
PROT UR-MCNC: NEGATIVE MG/DL — SIGNIFICANT CHANGE UP
RBC # BLD: 3.5 M/UL — LOW (ref 3.8–5.2)
RBC # FLD: 13.5 % — SIGNIFICANT CHANGE UP (ref 10.3–14.5)
RBC CASTS # UR COMP ASSIST: 4 /HPF — SIGNIFICANT CHANGE UP (ref 0–4)
REVIEW: SIGNIFICANT CHANGE UP
SODIUM SERPL-SCNC: 138 MMOL/L — SIGNIFICANT CHANGE UP (ref 135–145)
SP GR SPEC: <1.005 — LOW (ref 1–1.03)
SQUAMOUS # UR AUTO: 6 /HPF — HIGH (ref 0–5)
UROBILINOGEN FLD QL: 0.2 MG/DL — SIGNIFICANT CHANGE UP (ref 0.2–1)
WBC # BLD: 10.43 K/UL — SIGNIFICANT CHANGE UP (ref 3.8–10.5)
WBC # FLD AUTO: 10.43 K/UL — SIGNIFICANT CHANGE UP (ref 3.8–10.5)
WBC UR QL: 12 /HPF — HIGH (ref 0–5)

## 2024-06-20 PROCEDURE — 86901 BLOOD TYPING SEROLOGIC RH(D): CPT

## 2024-06-20 PROCEDURE — 76816 OB US FOLLOW-UP PER FETUS: CPT | Mod: 26

## 2024-06-20 PROCEDURE — 86850 RBC ANTIBODY SCREEN: CPT

## 2024-06-20 PROCEDURE — 87077 CULTURE AEROBIC IDENTIFY: CPT

## 2024-06-20 PROCEDURE — 96361 HYDRATE IV INFUSION ADD-ON: CPT

## 2024-06-20 PROCEDURE — 86900 BLOOD TYPING SEROLOGIC ABO: CPT

## 2024-06-20 PROCEDURE — 85025 COMPLETE CBC W/AUTO DIFF WBC: CPT

## 2024-06-20 PROCEDURE — 81001 URINALYSIS AUTO W/SCOPE: CPT

## 2024-06-20 PROCEDURE — 96365 THER/PROPH/DIAG IV INF INIT: CPT

## 2024-06-20 PROCEDURE — 76816 OB US FOLLOW-UP PER FETUS: CPT

## 2024-06-20 PROCEDURE — 87086 URINE CULTURE/COLONY COUNT: CPT

## 2024-06-20 PROCEDURE — G0463: CPT

## 2024-06-20 PROCEDURE — 80053 COMPREHEN METABOLIC PANEL: CPT

## 2024-06-20 PROCEDURE — 76770 US EXAM ABDO BACK WALL COMP: CPT

## 2024-06-20 PROCEDURE — 76818 FETAL BIOPHYS PROFILE W/NST: CPT | Mod: 26,59

## 2024-06-20 PROCEDURE — 76770 US EXAM ABDO BACK WALL COMP: CPT | Mod: 26

## 2024-06-20 PROCEDURE — 76818 FETAL BIOPHYS PROFILE W/NST: CPT

## 2024-06-20 RX ORDER — CEFTRIAXONE SODIUM 500 MG
1000 VIAL (EA) INJECTION EVERY 24 HOURS
Refills: 0 | Status: DISCONTINUED | OUTPATIENT
Start: 2024-06-20 | End: 2024-07-04

## 2024-06-20 RX ORDER — DEXTROSE MONOHYDRATE AND SODIUM CHLORIDE 5; .3 G/100ML; G/100ML
1000 INJECTION, SOLUTION INTRAVENOUS ONCE
Refills: 0 | Status: COMPLETED | OUTPATIENT
Start: 2024-06-20 | End: 2024-06-20

## 2024-06-20 RX ORDER — DEXTROSE MONOHYDRATE AND SODIUM CHLORIDE 5; .3 G/100ML; G/100ML
1000 INJECTION, SOLUTION INTRAVENOUS
Refills: 0 | Status: DISCONTINUED | OUTPATIENT
Start: 2024-06-20 | End: 2024-07-04

## 2024-06-20 RX ORDER — DEXTROSE MONOHYDRATE AND SODIUM CHLORIDE 5; .3 G/100ML; G/100ML
1000 INJECTION, SOLUTION INTRAVENOUS ONCE
Refills: 0 | Status: DISCONTINUED | OUTPATIENT
Start: 2024-06-20 | End: 2024-07-04

## 2024-06-20 RX ADMIN — DEXTROSE MONOHYDRATE AND SODIUM CHLORIDE 1000 MILLILITER(S): 5; .3 INJECTION, SOLUTION INTRAVENOUS at 03:06

## 2024-06-20 RX ADMIN — Medication 100 MILLIGRAM(S): at 09:52

## 2024-06-20 RX ADMIN — DEXTROSE MONOHYDRATE AND SODIUM CHLORIDE 125 MILLILITER(S): 5; .3 INJECTION, SOLUTION INTRAVENOUS at 04:20

## 2024-06-21 DIAGNOSIS — R06.02 SHORTNESS OF BREATH: ICD-10-CM

## 2024-06-21 DIAGNOSIS — M54.9 DORSALGIA, UNSPECIFIED: ICD-10-CM

## 2024-06-21 DIAGNOSIS — O26.892 OTHER SPECIFIED PREGNANCY RELATED CONDITIONS, SECOND TRIMESTER: ICD-10-CM

## 2024-06-21 DIAGNOSIS — R30.0 DYSURIA: ICD-10-CM

## 2024-06-21 DIAGNOSIS — O34.82 MATERNAL CARE FOR OTHER ABNORMALITIES OF PELVIC ORGANS, SECOND TRIMESTER: ICD-10-CM

## 2024-06-21 DIAGNOSIS — O99.891 OTHER SPECIFIED DISEASES AND CONDITIONS COMPLICATING PREGNANCY: ICD-10-CM

## 2024-06-21 DIAGNOSIS — R51.9 HEADACHE, UNSPECIFIED: ICD-10-CM

## 2024-06-21 DIAGNOSIS — N13.30 UNSPECIFIED HYDRONEPHROSIS: ICD-10-CM

## 2024-06-21 DIAGNOSIS — Z3A.24 24 WEEKS GESTATION OF PREGNANCY: ICD-10-CM

## 2024-06-21 DIAGNOSIS — N83.209 UNSPECIFIED OVARIAN CYST, UNSPECIFIED SIDE: ICD-10-CM

## 2024-06-22 LAB
CULTURE RESULTS: ABNORMAL
SPECIMEN SOURCE: SIGNIFICANT CHANGE UP

## 2024-06-26 ENCOUNTER — NON-APPOINTMENT (OUTPATIENT)
Age: 34
End: 2024-06-26

## 2024-06-26 DIAGNOSIS — D35.2 BENIGN NEOPLASM OF PITUITARY GLAND: ICD-10-CM

## 2024-06-26 DIAGNOSIS — O09.299 SUPERVISION OF PREGNANCY WITH OTHER POOR REPRODUCTIVE OR OBSTETRIC HISTORY, UNSPECIFIED TRIMESTER: ICD-10-CM

## 2024-06-26 DIAGNOSIS — Z3A.24 24 WEEKS GESTATION OF PREGNANCY: ICD-10-CM

## 2024-06-26 DIAGNOSIS — O23.02 INFECTIONS OF KIDNEY IN PREGNANCY, SECOND TRIMESTER: ICD-10-CM

## 2024-06-28 ENCOUNTER — OUTPATIENT (OUTPATIENT)
Dept: OUTPATIENT SERVICES | Facility: HOSPITAL | Age: 34
LOS: 1 days | End: 2024-06-28
Payer: MEDICAID

## 2024-06-28 ENCOUNTER — NON-APPOINTMENT (OUTPATIENT)
Age: 34
End: 2024-06-28

## 2024-06-28 ENCOUNTER — LABORATORY RESULT (OUTPATIENT)
Age: 34
End: 2024-06-28

## 2024-06-28 ENCOUNTER — APPOINTMENT (OUTPATIENT)
Dept: MATERNAL FETAL MEDICINE | Facility: CLINIC | Age: 34
End: 2024-06-28

## 2024-06-28 VITALS
HEIGHT: 60 IN | DIASTOLIC BLOOD PRESSURE: 70 MMHG | BODY MASS INDEX: 29.67 KG/M2 | SYSTOLIC BLOOD PRESSURE: 100 MMHG | WEIGHT: 151.13 LBS | OXYGEN SATURATION: 99 % | HEART RATE: 83 BPM

## 2024-06-28 DIAGNOSIS — Z3A.20 20 WEEKS GESTATION OF PREGNANCY: ICD-10-CM

## 2024-06-28 DIAGNOSIS — O09.299 SUPERVISION OF PREGNANCY WITH OTHER POOR REPRODUCTIVE OR OBSTETRIC HISTORY, UNSPECIFIED TRIMESTER: ICD-10-CM

## 2024-06-28 DIAGNOSIS — O09.90 SUPERVISION OF HIGH RISK PREGNANCY, UNSPECIFIED, UNSPECIFIED TRIMESTER: ICD-10-CM

## 2024-06-28 DIAGNOSIS — O09.899 SUPERVISION OF OTHER HIGH RISK PREGNANCIES, UNSPECIFIED TRIMESTER: ICD-10-CM

## 2024-06-28 DIAGNOSIS — O09.92 SUPERVISION OF HIGH RISK PREGNANCY, UNSPECIFIED, SECOND TRIMESTER: ICD-10-CM

## 2024-06-28 DIAGNOSIS — Z86.69 PERSONAL HISTORY OF OTHER DISEASES OF THE NERVOUS SYSTEM AND SENSE ORGANS: ICD-10-CM

## 2024-06-28 DIAGNOSIS — Z87.59 PERSONAL HISTORY OF OTHER COMPLICATIONS OF PREGNANCY, CHILDBIRTH AND THE PUERPERIUM: ICD-10-CM

## 2024-06-28 DIAGNOSIS — Z36.3 ENCOUNTER FOR ANTENATAL SCREENING FOR MALFORMATIONS: ICD-10-CM

## 2024-06-28 DIAGNOSIS — O23.02 INFECTIONS OF KIDNEY IN PREGNANCY, SECOND TRIMESTER: ICD-10-CM

## 2024-06-28 DIAGNOSIS — B37.31 ACUTE CANDIDIASIS OF VULVA AND VAGINA: ICD-10-CM

## 2024-06-28 DIAGNOSIS — O09.32 SUPERVISION OF PREGNANCY WITH INSUFFICIENT ANTENATAL CARE, SECOND TRIMESTER: ICD-10-CM

## 2024-06-28 LAB
BILIRUB UR QL STRIP: NORMAL
GLUCOSE 1H P MEAL SERPL-MCNC: 114 MG/DL — SIGNIFICANT CHANGE UP (ref 70–134)
GLUCOSE UR-MCNC: NORMAL
HCG UR QL: 0.2 EU/DL
HCT VFR BLD CALC: 33.6 % — LOW (ref 34.5–45)
HGB BLD-MCNC: 11.2 G/DL — LOW (ref 11.5–15.5)
HGB UR QL STRIP.AUTO: NORMAL
KETONES UR-MCNC: NORMAL
LEUKOCYTE ESTERASE UR QL STRIP: NORMAL
MCHC RBC-ENTMCNC: 30.2 PG — SIGNIFICANT CHANGE UP (ref 27–34)
MCHC RBC-ENTMCNC: 33.3 GM/DL — SIGNIFICANT CHANGE UP (ref 32–36)
MCV RBC AUTO: 90.6 FL — SIGNIFICANT CHANGE UP (ref 80–100)
NITRITE UR QL STRIP: NORMAL
PH UR STRIP: 5.5
PLATELET # BLD AUTO: 252 K/UL — SIGNIFICANT CHANGE UP (ref 150–400)
PROT UR STRIP-MCNC: NORMAL
RBC # BLD: 3.71 M/UL — LOW (ref 3.8–5.2)
RBC # FLD: 13.8 % — SIGNIFICANT CHANGE UP (ref 10.3–14.5)
SP GR UR STRIP: 1.01
WBC # BLD: 9.87 K/UL — SIGNIFICANT CHANGE UP (ref 3.8–10.5)
WBC # FLD AUTO: 9.87 K/UL — SIGNIFICANT CHANGE UP (ref 3.8–10.5)

## 2024-06-28 PROCEDURE — G0463: CPT

## 2024-06-28 PROCEDURE — 86850 RBC ANTIBODY SCREEN: CPT

## 2024-06-28 PROCEDURE — 85027 COMPLETE CBC AUTOMATED: CPT

## 2024-06-28 PROCEDURE — 86900 BLOOD TYPING SEROLOGIC ABO: CPT

## 2024-06-28 PROCEDURE — 81003 URINALYSIS AUTO W/O SCOPE: CPT

## 2024-06-28 PROCEDURE — 99213 OFFICE O/P EST LOW 20 MIN: CPT | Mod: TH,GC,25

## 2024-06-28 PROCEDURE — 82950 GLUCOSE TEST: CPT

## 2024-07-06 ENCOUNTER — INPATIENT (INPATIENT)
Facility: HOSPITAL | Age: 34
LOS: 2 days | Discharge: HOME | DRG: 195 | End: 2024-07-09
Attending: OBSTETRICS & GYNECOLOGY | Admitting: OBSTETRICS & GYNECOLOGY
Payer: MEDICAID

## 2024-07-06 VITALS
OXYGEN SATURATION: 98 % | SYSTOLIC BLOOD PRESSURE: 111 MMHG | DIASTOLIC BLOOD PRESSURE: 70 MMHG | WEIGHT: 151.02 LBS | HEART RATE: 122 BPM | RESPIRATION RATE: 20 BRPM | TEMPERATURE: 99 F | HEIGHT: 60 IN

## 2024-07-06 LAB
ALBUMIN SERPL ELPH-MCNC: 3.5 G/DL — SIGNIFICANT CHANGE UP (ref 3.3–5)
ALP SERPL-CCNC: 67 U/L — SIGNIFICANT CHANGE UP (ref 40–120)
ALT FLD-CCNC: 22 U/L — SIGNIFICANT CHANGE UP (ref 10–45)
ANION GAP SERPL CALC-SCNC: 13 MMOL/L — SIGNIFICANT CHANGE UP (ref 5–17)
APPEARANCE UR: CLEAR — SIGNIFICANT CHANGE UP
APTT BLD: 25.8 SEC — SIGNIFICANT CHANGE UP (ref 24.5–35.6)
AST SERPL-CCNC: 20 U/L — SIGNIFICANT CHANGE UP (ref 10–40)
BACTERIA # UR AUTO: ABNORMAL /HPF
BASE EXCESS BLDV CALC-SCNC: -0.8 MMOL/L — SIGNIFICANT CHANGE UP (ref -2–3)
BASOPHILS # BLD AUTO: 0.02 K/UL — SIGNIFICANT CHANGE UP (ref 0–0.2)
BASOPHILS NFR BLD AUTO: 0.2 % — SIGNIFICANT CHANGE UP (ref 0–2)
BILIRUB SERPL-MCNC: 0.1 MG/DL — LOW (ref 0.2–1.2)
BILIRUB UR-MCNC: NEGATIVE — SIGNIFICANT CHANGE UP
BUN SERPL-MCNC: 8 MG/DL — SIGNIFICANT CHANGE UP (ref 7–23)
CA-I SERPL-SCNC: 1.18 MMOL/L — SIGNIFICANT CHANGE UP (ref 1.15–1.33)
CALCIUM SERPL-MCNC: 8.7 MG/DL — SIGNIFICANT CHANGE UP (ref 8.4–10.5)
CAST: 0 /LPF — SIGNIFICANT CHANGE UP (ref 0–4)
CHLORIDE BLDV-SCNC: 104 MMOL/L — SIGNIFICANT CHANGE UP (ref 96–108)
CHLORIDE SERPL-SCNC: 103 MMOL/L — SIGNIFICANT CHANGE UP (ref 96–108)
CO2 BLDV-SCNC: 24 MMOL/L — SIGNIFICANT CHANGE UP (ref 22–26)
CO2 SERPL-SCNC: 19 MMOL/L — LOW (ref 22–31)
COLOR SPEC: YELLOW — SIGNIFICANT CHANGE UP
CREAT SERPL-MCNC: 0.56 MG/DL — SIGNIFICANT CHANGE UP (ref 0.5–1.3)
D DIMER BLD IA.RAPID-MCNC: 715 NG/ML DDU — HIGH
DIFF PNL FLD: NEGATIVE — SIGNIFICANT CHANGE UP
EGFR: 124 ML/MIN/1.73M2 — SIGNIFICANT CHANGE UP
EOSINOPHIL # BLD AUTO: 0.12 K/UL — SIGNIFICANT CHANGE UP (ref 0–0.5)
EOSINOPHIL NFR BLD AUTO: 1.1 % — SIGNIFICANT CHANGE UP (ref 0–6)
GAS PNL BLDV: 132 MMOL/L — LOW (ref 136–145)
GAS PNL BLDV: SIGNIFICANT CHANGE UP
GLUCOSE BLDV-MCNC: 88 MG/DL — SIGNIFICANT CHANGE UP (ref 70–99)
GLUCOSE SERPL-MCNC: 93 MG/DL — SIGNIFICANT CHANGE UP (ref 70–99)
GLUCOSE UR QL: NEGATIVE MG/DL — SIGNIFICANT CHANGE UP
HCG SERPL-ACNC: HIGH MIU/ML
HCO3 BLDV-SCNC: 23 MMOL/L — SIGNIFICANT CHANGE UP (ref 22–29)
HCT VFR BLD CALC: 30.5 % — LOW (ref 34.5–45)
HCT VFR BLDA CALC: 31 % — LOW (ref 34.5–46.5)
HGB BLD CALC-MCNC: 10.2 G/DL — LOW (ref 11.7–16.1)
HGB BLD-MCNC: 9.8 G/DL — LOW (ref 11.5–15.5)
IMM GRANULOCYTES NFR BLD AUTO: 0.6 % — SIGNIFICANT CHANGE UP (ref 0–0.9)
INR BLD: 1.02 RATIO — SIGNIFICANT CHANGE UP (ref 0.85–1.18)
KETONES UR-MCNC: NEGATIVE MG/DL — SIGNIFICANT CHANGE UP
LACTATE BLDV-MCNC: 0.9 MMOL/L — SIGNIFICANT CHANGE UP (ref 0.5–2)
LEUKOCYTE ESTERASE UR-ACNC: ABNORMAL
LYMPHOCYTES # BLD AUTO: 1.26 K/UL — SIGNIFICANT CHANGE UP (ref 1–3.3)
LYMPHOCYTES # BLD AUTO: 11.8 % — LOW (ref 13–44)
MCHC RBC-ENTMCNC: 29.2 PG — SIGNIFICANT CHANGE UP (ref 27–34)
MCHC RBC-ENTMCNC: 32.1 GM/DL — SIGNIFICANT CHANGE UP (ref 32–36)
MCV RBC AUTO: 90.8 FL — SIGNIFICANT CHANGE UP (ref 80–100)
MONOCYTES # BLD AUTO: 0.84 K/UL — SIGNIFICANT CHANGE UP (ref 0–0.9)
MONOCYTES NFR BLD AUTO: 7.8 % — SIGNIFICANT CHANGE UP (ref 2–14)
NEUTROPHILS # BLD AUTO: 8.42 K/UL — HIGH (ref 1.8–7.4)
NEUTROPHILS NFR BLD AUTO: 78.5 % — HIGH (ref 43–77)
NITRITE UR-MCNC: NEGATIVE — SIGNIFICANT CHANGE UP
NRBC # BLD: 0 /100 WBCS — SIGNIFICANT CHANGE UP (ref 0–0)
PCO2 BLDV: 36 MMHG — LOW (ref 39–42)
PH BLDV: 7.42 — SIGNIFICANT CHANGE UP (ref 7.32–7.43)
PH UR: 8 — SIGNIFICANT CHANGE UP (ref 5–8)
PLATELET # BLD AUTO: 216 K/UL — SIGNIFICANT CHANGE UP (ref 150–400)
PO2 BLDV: 31 MMHG — SIGNIFICANT CHANGE UP (ref 25–45)
POTASSIUM BLDV-SCNC: 3.8 MMOL/L — SIGNIFICANT CHANGE UP (ref 3.5–5.1)
POTASSIUM SERPL-MCNC: 3.8 MMOL/L — SIGNIFICANT CHANGE UP (ref 3.5–5.3)
POTASSIUM SERPL-SCNC: 3.8 MMOL/L — SIGNIFICANT CHANGE UP (ref 3.5–5.3)
PROT SERPL-MCNC: 6.6 G/DL — SIGNIFICANT CHANGE UP (ref 6–8.3)
PROT UR-MCNC: NEGATIVE MG/DL — SIGNIFICANT CHANGE UP
PROTHROM AB SERPL-ACNC: 10.7 SEC — SIGNIFICANT CHANGE UP (ref 9.5–13)
RBC # BLD: 3.36 M/UL — LOW (ref 3.8–5.2)
RBC # FLD: 13.2 % — SIGNIFICANT CHANGE UP (ref 10.3–14.5)
RBC CASTS # UR COMP ASSIST: 2 /HPF — SIGNIFICANT CHANGE UP (ref 0–4)
REVIEW: SIGNIFICANT CHANGE UP
SAO2 % BLDV: 53.9 % — LOW (ref 67–88)
SODIUM SERPL-SCNC: 135 MMOL/L — SIGNIFICANT CHANGE UP (ref 135–145)
SP GR SPEC: 1 — SIGNIFICANT CHANGE UP (ref 1–1.03)
SQUAMOUS # UR AUTO: 5 /HPF — SIGNIFICANT CHANGE UP (ref 0–5)
UROBILINOGEN FLD QL: 0.2 MG/DL — SIGNIFICANT CHANGE UP (ref 0.2–1)
WBC # BLD: 10.72 K/UL — HIGH (ref 3.8–10.5)
WBC # FLD AUTO: 10.72 K/UL — HIGH (ref 3.8–10.5)
WBC UR QL: 4 /HPF — SIGNIFICANT CHANGE UP (ref 0–5)

## 2024-07-06 PROCEDURE — 99285 EMERGENCY DEPT VISIT HI MDM: CPT

## 2024-07-06 RX ORDER — ACETAMINOPHEN 325 MG
975 TABLET ORAL ONCE
Refills: 0 | Status: COMPLETED | OUTPATIENT
Start: 2024-07-06 | End: 2024-07-06

## 2024-07-06 RX ORDER — SODIUM CHLORIDE 0.9 % (FLUSH) 0.9 %
1000 SYRINGE (ML) INJECTION ONCE
Refills: 0 | Status: COMPLETED | OUTPATIENT
Start: 2024-07-06 | End: 2024-07-06

## 2024-07-06 RX ADMIN — Medication 1000 MILLILITER(S): at 22:46

## 2024-07-06 RX ADMIN — Medication 975 MILLIGRAM(S): at 22:46

## 2024-07-06 NOTE — ED PROVIDER NOTE - PHYSICAL EXAMINATION
PHYSICAL EXAM:  GENERAL: Sitting comfortable in bed, in no acute distress  HENMT: Atraumatic, moist mucous membranes  EYES: Clear bilaterally, PERRL, EOMs intact b/l  HEART: Tachy and regular rhythm, S1/S2  RESPIRATORY: Clear to auscultation bilaterally, no wheezes/rhonchi/rales  ABDOMEN: Soft, nontender, gravid  MSK: B/l sacral ttp  EXTREMITIES: No lower extremity edema  NEURO: Alert, follows commands, moving all extremities symmetrically   SKIN: Skin normal color for race, warm, dry

## 2024-07-06 NOTE — ED PROVIDER NOTE - ATTENDING CONTRIBUTION TO CARE
ECG directly visualized by me and shows sinus tachycardia, rate 117, , QRS 70, QTc 454, no ST elevations or depressions. Deliau:   ECG directly visualized by me and shows normal sinus rhythm, rate 78, , QRS 86, QTc 426, no ST elevations or depressions.  Overall low voltage.    Emergency Medicine Attending MD Cloud:  patient seen and evaluated with the resident.  I was present for key portions of the History & Physical, and I agree with the Impression & Plan.    Patient is a 34-year-old G2, P1 at 22 weeks, complaining of 1 month shortness of breath, dyspnea on exertion.  Endorses worsening dyspnea when laying flat.  Also complaining of fever, sore throat, myalgias, arthralgias, lower back pain.  Gets prenatal care at [facility].    VS: Heart rate 109, otherwise within normal limits  Gen: Well appearing adult female in NAD  Head: NC/AT  Neck: trachea midline  Resp:  No distress  CV: Tachycardic 22 on the right discharge name, 80s for Lobo Cornelius and she is in the process of being moved to [employer], no RMG  Abd: Soft, nontender, gravid.  Flank: Mild lower CVA tenderness to palpation  Ext: no deformities  Neuro:  A&Ox4 appears non focal  Skin:  Warm and dry as visualized  Psych: appropriate    Medical Decision Making / Differential Diagnosis:  Differential diagnosis includes URI, UTI plus minus pyelonephritis, pulmonary embolism.  Patient will be worked up with urinalysis, CBC, CMP, D-dimer.  Potential lower extremity duplex, possible CTA chest.  Fetal heart rate        ECG directly visualized by me and shows sinus tachycardia, rate 117, , QRS 70, QTc 454, no ST elevations or depressions.

## 2024-07-06 NOTE — ED PROVIDER NOTE - CLINICAL SUMMARY MEDICAL DECISION MAKING FREE TEXT BOX
34F  at 22 wks p/w chest pressure, SOB, sore throat, arthralgias, myalgias. Tachy, febrile. DDx includes but not limited to: URI, UTI, PE. Plan: blood work, UA, IVF, tylenol, viral swab, ECG. Will re-assess.

## 2024-07-06 NOTE — ED ADULT NURSE NOTE - OBJECTIVE STATEMENT
34 y.o F BIB self p/w c/o SOB. A+Ox4. Pt currently 22wks pregnant, states for past x1 mo has been experiencing CP and SOB worsening w/ walking and lying down. Acutely last night, also started having fevers and sore throat, so came to ER for further eval. Denies seeing OB or PCP for sx. Upon initial assessment, pt tachycardic to 109 sinus w/ oral temp of 100.5 degrees F and O2 sat 98% RA. LS clear B/L, cough noted w/ inspiration. Reports also over past x1 mo has been experiencing lower back pain w/ 9/10 B/L hip pain, and B/L calf pain. Worsening pain of calfs w/ palpation.  A0 L1. PMH PCOS. No other complaints at this time. Hebrew speaking,  utilized. Partner at bedside, comfort and safety maintained.

## 2024-07-06 NOTE — ED PROVIDER NOTE - OBJECTIVE STATEMENT
34F  at 22 wks p/w chest pressure, SOB, sore throat, arthralgias, myalgias.  used. Pt says that she has had 1 month of SOB worse with exertion and laying flat. 2 days ago started having chest pressure and fever. Yesterday started having sore throat. Follows with our ob group. Denies abd pain, vaginal bleeding, leakage of fluid, LE edema.

## 2024-07-07 DIAGNOSIS — J18.9 PNEUMONIA, UNSPECIFIED ORGANISM: ICD-10-CM

## 2024-07-07 LAB
BASOPHILS # BLD AUTO: 0.01 K/UL — SIGNIFICANT CHANGE UP (ref 0–0.2)
BASOPHILS NFR BLD AUTO: 0.1 % — SIGNIFICANT CHANGE UP (ref 0–2)
BLD GP AB SCN SERPL QL: NEGATIVE — SIGNIFICANT CHANGE UP
EOSINOPHIL # BLD AUTO: 0.18 K/UL — SIGNIFICANT CHANGE UP (ref 0–0.5)
EOSINOPHIL NFR BLD AUTO: 2 % — SIGNIFICANT CHANGE UP (ref 0–6)
FLUAV AG NPH QL: SIGNIFICANT CHANGE UP
FLUBV AG NPH QL: SIGNIFICANT CHANGE UP
HCT VFR BLD CALC: 28.2 % — LOW (ref 34.5–45)
HGB BLD-MCNC: 9.2 G/DL — LOW (ref 11.5–15.5)
IMM GRANULOCYTES NFR BLD AUTO: 0.5 % — SIGNIFICANT CHANGE UP (ref 0–0.9)
LYMPHOCYTES # BLD AUTO: 1.16 K/UL — SIGNIFICANT CHANGE UP (ref 1–3.3)
LYMPHOCYTES # BLD AUTO: 12.7 % — LOW (ref 13–44)
MCHC RBC-ENTMCNC: 29.9 PG — SIGNIFICANT CHANGE UP (ref 27–34)
MCHC RBC-ENTMCNC: 32.6 GM/DL — SIGNIFICANT CHANGE UP (ref 32–36)
MCV RBC AUTO: 91.6 FL — SIGNIFICANT CHANGE UP (ref 80–100)
MONOCYTES # BLD AUTO: 0.84 K/UL — SIGNIFICANT CHANGE UP (ref 0–0.9)
MONOCYTES NFR BLD AUTO: 9.2 % — SIGNIFICANT CHANGE UP (ref 2–14)
NEUTROPHILS # BLD AUTO: 6.9 K/UL — SIGNIFICANT CHANGE UP (ref 1.8–7.4)
NEUTROPHILS NFR BLD AUTO: 75.5 % — SIGNIFICANT CHANGE UP (ref 43–77)
NRBC # BLD: 0 /100 WBCS — SIGNIFICANT CHANGE UP (ref 0–0)
NT-PROBNP SERPL-SCNC: 64 PG/ML — SIGNIFICANT CHANGE UP (ref 0–300)
PLATELET # BLD AUTO: 208 K/UL — SIGNIFICANT CHANGE UP (ref 150–400)
RBC # BLD: 3.08 M/UL — LOW (ref 3.8–5.2)
RBC # FLD: 13.5 % — SIGNIFICANT CHANGE UP (ref 10.3–14.5)
RH IG SCN BLD-IMP: POSITIVE — SIGNIFICANT CHANGE UP
RSV RNA NPH QL NAA+NON-PROBE: SIGNIFICANT CHANGE UP
SARS-COV-2 RNA SPEC QL NAA+PROBE: SIGNIFICANT CHANGE UP
TROPONIN T, HIGH SENSITIVITY RESULT: <6 NG/L — SIGNIFICANT CHANGE UP (ref 0–51)
WBC # BLD: 9.14 K/UL — SIGNIFICANT CHANGE UP (ref 3.8–10.5)
WBC # FLD AUTO: 9.14 K/UL — SIGNIFICANT CHANGE UP (ref 3.8–10.5)

## 2024-07-07 PROCEDURE — 71275 CT ANGIOGRAPHY CHEST: CPT | Mod: 26,MC

## 2024-07-07 PROCEDURE — 99221 1ST HOSP IP/OBS SF/LOW 40: CPT

## 2024-07-07 RX ORDER — AZITHROMYCIN 250 MG/1
500 TABLET, FILM COATED ORAL ONCE
Refills: 0 | Status: COMPLETED | OUTPATIENT
Start: 2024-07-07 | End: 2024-07-07

## 2024-07-07 RX ORDER — ONDANSETRON HYDROCHLORIDE 2 MG/ML
4 INJECTION INTRAMUSCULAR; INTRAVENOUS EVERY 6 HOURS
Refills: 0 | Status: DISCONTINUED | OUTPATIENT
Start: 2024-07-07 | End: 2024-07-09

## 2024-07-07 RX ORDER — BENZONATATE 100 MG/1
100 TABLET ORAL THREE TIMES A DAY
Refills: 0 | Status: DISCONTINUED | OUTPATIENT
Start: 2024-07-07 | End: 2024-07-08

## 2024-07-07 RX ORDER — HEPARIN SODIUM 50 [USP'U]/ML
5000 INJECTION, SOLUTION INTRAVENOUS EVERY 12 HOURS
Refills: 0 | Status: DISCONTINUED | OUTPATIENT
Start: 2024-07-07 | End: 2024-07-09

## 2024-07-07 RX ORDER — PRENATAL VIT/IRON FUM/FOLIC AC 60 MG-1 MG
1 TABLET ORAL DAILY
Refills: 0 | Status: DISCONTINUED | OUTPATIENT
Start: 2024-07-07 | End: 2024-07-09

## 2024-07-07 RX ORDER — AZITHROMYCIN 250 MG/1
500 TABLET, FILM COATED ORAL EVERY 24 HOURS
Refills: 0 | Status: DISCONTINUED | OUTPATIENT
Start: 2024-07-07 | End: 2024-07-07

## 2024-07-07 RX ORDER — SULFAMETHOXAZOLE AND TRIMETHOPRIM 800; 160 MG/1; MG/1
1 TABLET ORAL DAILY
Refills: 0 | Status: DISCONTINUED | OUTPATIENT
Start: 2024-07-07 | End: 2024-07-09

## 2024-07-07 RX ORDER — ACETAMINOPHEN 325 MG
650 TABLET ORAL EVERY 6 HOURS
Refills: 0 | Status: DISCONTINUED | OUTPATIENT
Start: 2024-07-07 | End: 2024-07-09

## 2024-07-07 RX ORDER — FAMOTIDINE 40 MG
20 TABLET ORAL ONCE
Refills: 0 | Status: COMPLETED | OUTPATIENT
Start: 2024-07-07 | End: 2024-07-07

## 2024-07-07 RX ORDER — CEFTRIAXONE SODIUM 500 MG
1000 VIAL (EA) INJECTION EVERY 24 HOURS
Refills: 0 | Status: DISCONTINUED | OUTPATIENT
Start: 2024-07-08 | End: 2024-07-09

## 2024-07-07 RX ORDER — AZITHROMYCIN 250 MG/1
500 TABLET, FILM COATED ORAL DAILY
Refills: 0 | Status: DISCONTINUED | OUTPATIENT
Start: 2024-07-08 | End: 2024-07-08

## 2024-07-07 RX ORDER — SODIUM CHLORIDE 0.9 % (FLUSH) 0.9 %
1000 SYRINGE (ML) INJECTION ONCE
Refills: 0 | Status: COMPLETED | OUTPATIENT
Start: 2024-07-07 | End: 2024-07-07

## 2024-07-07 RX ORDER — ACETAMINOPHEN 325 MG
650 TABLET ORAL EVERY 6 HOURS
Refills: 0 | Status: DISCONTINUED | OUTPATIENT
Start: 2024-07-07 | End: 2024-07-07

## 2024-07-07 RX ORDER — CEFTRIAXONE SODIUM 500 MG
1000 VIAL (EA) INJECTION ONCE
Refills: 0 | Status: COMPLETED | OUTPATIENT
Start: 2024-07-07 | End: 2024-07-07

## 2024-07-07 RX ADMIN — Medication 100 MILLIGRAM(S): at 04:43

## 2024-07-07 RX ADMIN — Medication 20 MILLIGRAM(S): at 22:07

## 2024-07-07 RX ADMIN — Medication 650 MILLIGRAM(S): at 16:56

## 2024-07-07 RX ADMIN — Medication 1 TABLET(S): at 21:16

## 2024-07-07 RX ADMIN — HEPARIN SODIUM 5000 UNIT(S): 50 INJECTION, SOLUTION INTRAVENOUS at 21:18

## 2024-07-07 RX ADMIN — Medication 1000 MILLILITER(S): at 01:25

## 2024-07-07 RX ADMIN — SULFAMETHOXAZOLE AND TRIMETHOPRIM 1 TABLET(S): 800; 160 TABLET ORAL at 21:27

## 2024-07-07 RX ADMIN — ONDANSETRON HYDROCHLORIDE 4 MILLIGRAM(S): 2 INJECTION INTRAMUSCULAR; INTRAVENOUS at 17:06

## 2024-07-07 RX ADMIN — AZITHROMYCIN 255 MILLIGRAM(S): 250 TABLET, FILM COATED ORAL at 05:29

## 2024-07-07 RX ADMIN — BENZONATATE 100 MILLIGRAM(S): 100 TABLET ORAL at 22:06

## 2024-07-07 RX ADMIN — Medication 1000 MILLILITER(S): at 03:50

## 2024-07-07 NOTE — H&P ADULT - NSHPPHYSICALEXAM_GEN_ALL_CORE
Objective  – VS  T(C): 36.9 (07-07-24 @ 06:28)  HR: 86 (07-07-24 @ 06:28)  BP: 103/64 (07-07-24 @ 06:28)  RR: 18 (07-07-24 @ 06:28)  SpO2: 98% (07-07-24 @ 06:28)  – PE:   General: Laying in bed comfortably, NAD  CV: RRR, no murmurs, rubs, gallops  Pulm: Nonlabored breathing. Rales heard in left mid-lower lobes. Inspiration elicits coughing.  Abd: Soft, Gravid. Nontender. No rebound or guarding.  Extr: No swelling. No calf tenderness bilaterally.

## 2024-07-07 NOTE — H&P ADULT - ASSESSMENT
35y/o  at 27w1d GA, prenatal course complicated by recurrent UTIs with admission for ESBL pyelonephritis on Macrobid suppression, presenting with x1 month of SOB and dyspnea on exertion associated with cough, sore throat, and fevers x3 days. Was found to be febrile 100.5F and tachycardic in 120s in the ED. CTA PE significant for consolidative opacity in the left lower lobe, concerning for pneumonia. S/p Zithromax and CTX in the ED.     Plan:    #Suspected Pneumonia  - Admit to Antepartum service under Dr. Hargrove  - Continue Zithromax 500mg q12 hrs and CTX 1g q24hrs  - NST BID  - Continuous O2   - MFM evaluation    #Recurrent UTIs  - Continue Macrobid suppression  - UA shows no evidence of UTI (neg LE, neg Nitrites)    Discussed with Attending Dr. Jamarcus Pruitt, PGY-2 35y/o  at 27w1d GA, prenatal course complicated by recurrent UTIs with admission for ESBL pyelonephritis on Macrobid suppression, presenting with x1 month of SOB and dyspnea on exertion associated with cough, sore throat, and fevers x3 days. Was found to be febrile 100.5F and tachycardic in 120s in the ED. Rales auscultated in L mid-lower lung fields. Mild leukocytosis 10.72. CTA PE significant for consolidative opacity in the left lower lobe, concerning for pneumonia. S/p Zithromax and CTX in the ED.     Plan:    #Suspected Pneumonia  - Admit to Antepartum service under Dr. Hargrove  - Continue Zithromax 500mg q12 hrs and CTX 1g q24hrs  - NST BID  - Continuous O2   - MFM evaluation    #Recurrent UTIs  - Continue Macrobid suppression  - UA shows no evidence of UTI (neg LE, neg Nitrites)    Discussed with Attending Dr. Jamarcus Pruitt, PGY-2

## 2024-07-07 NOTE — PROVIDER CONTACT NOTE (OTHER) - ACTION/TREATMENT ORDERED:
MD Jeter read and signed strip, no interventions at this time, pt educated on importance of NST, all qwuestions andwered

## 2024-07-07 NOTE — ED ADULT NURSE REASSESSMENT NOTE - NS ED NURSE REASSESS COMMENT FT1
Pt resting in bed, hypotension noted. MD aware, 1L NS currently being administered. Denies any lightheadedness/ dizziness, HA, vision changes, n/v. Safety maintained.

## 2024-07-07 NOTE — H&P ADULT - NSHPLABSRESULTS_GEN_ALL_CORE
LABS:                        9.8    10.72 )-----------( 216      ( 2024 22:52 )             30.5     07-    135  |  103  |  8   ----------------------------<  93  3.8   |  19<L>  |  0.56    Ca    8.7      2024 22:52    TPro  6.6  /  Alb  3.5  /  TBili  0.1<L>  /  DBili  x   /  AST  20  /  ALT  22  /  AlkPhos  67  07-06    PT/INR - ( 2024 22:52 )   PT: 10.7 sec;   INR: 1.02 ratio         PTT - ( 2024 22:52 )  PTT:25.8 sec  Urinalysis Basic - ( 2024 23:14 )    Color: Yellow / Appearance: Clear / S.005 / pH: x  Gluc: x / Ketone: Negative mg/dL  / Bili: Negative / Urobili: 0.2 mg/dL   Blood: x / Protein: Negative mg/dL / Nitrite: Negative   Leuk Esterase: Small / RBC: 2 /HPF / WBC 4 /HPF   Sq Epi: x / Non Sq Epi: 5 /HPF / Bacteria: Few /HPF        Blood Type: A Positive      RADIOLOGY & ADDITIONAL STUDIES:    ACC: [id number] EXAM:  CT ANGIO CHEST PULM [facility]   ORDERED BY:  KRISTINA CAPONE     PROCEDURE DATE:  2024          INTERPRETATION:  CLINICAL INFORMATION: Fever. Patient pregnant.    COMPARISON: CT abdomen and pelvis 2023.    CONTRAST/COMPLICATIONS:  IV Contrast: Omnipaque 350  39 cc administered  Oral Contrast: NONE  Complications: None reported at time of study completion    PROCEDURE:  CT Angiography of the Chest.  Sagittal and coronal reformats were performed as well as 3D (MIP)   reconstructions.    FINDINGS:    LUNGS AND LARGE AIRWAYS: Patent central airways. Consolidative opacity in   the left lower lobe, concerning for developing pneumonia. Additional mild   bilateral dependent atelectasis.  PLEURA: No pleural effusion.  VESSELS: No pulmonary embolism.  HEART: Heart size is normal. No pericardial effusion.  MEDIASTINUM AND GURPREET: No lymphadenopathy.  CHEST WALL AND LOWER NECK: Within normal limits.  VISUALIZED UPPER ABDOMEN: Within normal limits.  BONES: Within normal limits.    IMPRESSION:    No pulmonary embolism.    Consolidative opacity in the left lower lobe, concerning for developing   pneumonia. Chest radiographic imaging in 6 weeks is advised to   demonstrate resolution.    These results were discussed via telephone at 2024 3:48 AM by Dr. Burgos of radiology with Dr. Capone.    --- End of Report ---            SYED BURGOS MD; Attending Radiologist  This document has been electronically signed. 2024  3:57AM

## 2024-07-07 NOTE — ED ADULT NURSE REASSESSMENT NOTE - NS ED NURSE REASSESS COMMENT FT1
0720 Pt in ER purple/midway rm16. TBA OB. No bed yet. A&Ox4. No c/o pain at present. IV intact R ACF without sx of infilt. Skin W&D. Lips and nailbeds pink.

## 2024-07-07 NOTE — H&P ADULT - HISTORY OF PRESENT ILLNESS
HPI     ID #[id number]    35y/o  at 27w1d GA presented to the ED for SOB, dyspnea on exertion and dyspnea upon laying down x1 month. States she began having cough, sore throat, and fevers x2 days. Of note, she has +sick contacts at home; her son has been coughing for the past few weeks. Denies chest pain, palpitations, nausea, vomiting, abdominal pain, urinary complaints, constipation, diarrhea.   Denies contractions, vaginal bleeding, leakage of fluids. Reports good FM.     Prenatal course is significant for the following:    #History of PEC (on bASA, home BP monitoring, followed by Cardio OB and patient has a daily headache and neuro appointment on .     #Fetal Hypoplastic nasal bone (per transfer records from [facility])  -ATU (5/3): 4.3mm (6%); Nasal bone appears normal on  comprehensive sono    #Recurrent UTIs  -Patient states that from the beginning of this pregnancy she has been suffering from recurrent UTIs  -Admitted to [facility] at 6 wk (-2/15/24) with ESBL pyelonephritis, patient received Invanz x5 days  -UCx on  positive for E. Faecalis  - UC&S neg on  and  repeat UCx sent today ()  - Originally started on Keflex 500mg qD for suppression, though switched to Macrobid given Keflex with wider systemic effects    #History of Pituitary Adenoma/Headaches (7/10)  -Patient endorsed daily headaches that predated pregnancy  -H/a recurs after taking 1 tab of Tylenol; advised 2 tabs q 6 hr PRN  - on Magnesium Oxide for HA control    #Nausea and Vomiting of Pregnancy  - Admitted to [facility] at 14 wk (-24) for hematemesis. UTI was ruled out. D/c'd on Zofran disintegrating tabs; resolved with intermittent episodes of vomiting.    WILLIAM: 10/2/2024      OB:  () in [facility] 6#, pregnancy c/b PEC  GYN: +hx of ovarian cysts Denies fibroids, abnormal paps, STIs  PMHx: Constipation, HA  Meds: ASA, PNV, Magnesium, Macrobid  All: NKDA  Surgeries: Denies

## 2024-07-07 NOTE — H&P ADULT - ATTENDING COMMENTS
MFM Attending     Pt evaluated and counseled with Dr. Tavarez.  Agree with the assessment and plan above.    In summary Ms. Fenton is a 33yo P1 @ 27 1/7 weeks who presented to the ED complaining of fever, cough and malaise.  She also reported increasing shortness of breath and dyspnea on exertion which has been worsening over the past month.  She had an extensive workup in the ED which was significant for likely left lower lobe pneumonia.  Studies were negative for PE, RVP/EKG/troponin/proBNP negative.  She is being admitted to the antepartum service for treatment of community acquired pneumonia with ceftriaxone and azithromycin.  Will also order a maternal echocardiogram given her prolonged sxs of SOB/DE.

## 2024-07-08 ENCOUNTER — RESULT REVIEW (OUTPATIENT)
Age: 34
End: 2024-07-08

## 2024-07-08 ENCOUNTER — ASOB RESULT (OUTPATIENT)
Age: 34
End: 2024-07-08

## 2024-07-08 ENCOUNTER — APPOINTMENT (OUTPATIENT)
Dept: ANTEPARTUM | Facility: CLINIC | Age: 34
End: 2024-07-08
Payer: MEDICAID

## 2024-07-08 LAB
BASOPHILS # BLD AUTO: 0.02 K/UL — SIGNIFICANT CHANGE UP (ref 0–0.2)
BASOPHILS NFR BLD AUTO: 0.2 % — SIGNIFICANT CHANGE UP (ref 0–2)
CULTURE RESULTS: SIGNIFICANT CHANGE UP
EOSINOPHIL # BLD AUTO: 0.12 K/UL — SIGNIFICANT CHANGE UP (ref 0–0.5)
EOSINOPHIL NFR BLD AUTO: 1.2 % — SIGNIFICANT CHANGE UP (ref 0–6)
HCT VFR BLD CALC: 30.6 % — LOW (ref 34.5–45)
HGB BLD-MCNC: 10 G/DL — LOW (ref 11.5–15.5)
IMM GRANULOCYTES NFR BLD AUTO: 0.7 % — SIGNIFICANT CHANGE UP (ref 0–0.9)
LYMPHOCYTES # BLD AUTO: 1.53 K/UL — SIGNIFICANT CHANGE UP (ref 1–3.3)
LYMPHOCYTES # BLD AUTO: 15.3 % — SIGNIFICANT CHANGE UP (ref 13–44)
MCHC RBC-ENTMCNC: 29.5 PG — SIGNIFICANT CHANGE UP (ref 27–34)
MCHC RBC-ENTMCNC: 32.7 GM/DL — SIGNIFICANT CHANGE UP (ref 32–36)
MCV RBC AUTO: 90.3 FL — SIGNIFICANT CHANGE UP (ref 80–100)
MONOCYTES # BLD AUTO: 0.83 K/UL — SIGNIFICANT CHANGE UP (ref 0–0.9)
MONOCYTES NFR BLD AUTO: 8.3 % — SIGNIFICANT CHANGE UP (ref 2–14)
NEUTROPHILS # BLD AUTO: 7.43 K/UL — HIGH (ref 1.8–7.4)
NEUTROPHILS NFR BLD AUTO: 74.3 % — SIGNIFICANT CHANGE UP (ref 43–77)
NRBC # BLD: 0 /100 WBCS — SIGNIFICANT CHANGE UP (ref 0–0)
PLATELET # BLD AUTO: 207 K/UL — SIGNIFICANT CHANGE UP (ref 150–400)
RBC # BLD: 3.39 M/UL — LOW (ref 3.8–5.2)
RBC # FLD: 13.4 % — SIGNIFICANT CHANGE UP (ref 10.3–14.5)
SPECIMEN SOURCE: SIGNIFICANT CHANGE UP
WBC # BLD: 10 K/UL — SIGNIFICANT CHANGE UP (ref 3.8–10.5)
WBC # FLD AUTO: 10 K/UL — SIGNIFICANT CHANGE UP (ref 3.8–10.5)

## 2024-07-08 PROCEDURE — 99222 1ST HOSP IP/OBS MODERATE 55: CPT

## 2024-07-08 PROCEDURE — 93356 MYOCRD STRAIN IMG SPCKL TRCK: CPT

## 2024-07-08 PROCEDURE — 76818 FETAL BIOPHYS PROFILE W/NST: CPT | Mod: 26,59

## 2024-07-08 PROCEDURE — 93306 TTE W/DOPPLER COMPLETE: CPT | Mod: 26

## 2024-07-08 PROCEDURE — 76816 OB US FOLLOW-UP PER FETUS: CPT | Mod: 26

## 2024-07-08 RX ORDER — AZITHROMYCIN 250 MG/1
500 TABLET, FILM COATED ORAL EVERY 24 HOURS
Refills: 0 | Status: DISCONTINUED | OUTPATIENT
Start: 2024-07-08 | End: 2024-07-09

## 2024-07-08 RX ORDER — ASPIRIN 325 MG/1
162 TABLET, FILM COATED ORAL DAILY
Refills: 0 | Status: DISCONTINUED | OUTPATIENT
Start: 2024-07-08 | End: 2024-07-09

## 2024-07-08 RX ORDER — ACETAMINOPHEN 325 MG
1000 TABLET ORAL ONCE
Refills: 0 | Status: COMPLETED | OUTPATIENT
Start: 2024-07-08 | End: 2024-07-08

## 2024-07-08 RX ADMIN — SULFAMETHOXAZOLE AND TRIMETHOPRIM 1 TABLET(S): 800; 160 TABLET ORAL at 12:03

## 2024-07-08 RX ADMIN — Medication 100 MILLIGRAM(S): at 05:11

## 2024-07-08 RX ADMIN — HEPARIN SODIUM 5000 UNIT(S): 50 INJECTION, SOLUTION INTRAVENOUS at 10:02

## 2024-07-08 RX ADMIN — Medication 400 MILLIGRAM(S): at 01:05

## 2024-07-08 RX ADMIN — HEPARIN SODIUM 5000 UNIT(S): 50 INJECTION, SOLUTION INTRAVENOUS at 20:38

## 2024-07-08 RX ADMIN — Medication 1 TABLET(S): at 20:38

## 2024-07-08 RX ADMIN — AZITHROMYCIN 500 MILLIGRAM(S): 250 TABLET, FILM COATED ORAL at 06:15

## 2024-07-08 RX ADMIN — Medication 1000 MILLIGRAM(S): at 02:05

## 2024-07-08 NOTE — PROGRESS NOTE ADULT - NSPROGADDITIONALINFOA_GEN_ALL_CORE
35yo  at 27w2d, h/o ESBL pyelonephritis s/p admission in 2024, presented for fevers, URI symptoms, SOB and dyspnea on exertion, with imaging findings concerning for pneumonia ( admitted for IV antibiotics.     Patient presented with fevers with T max of 100.9 F, last fever on  at 00:34 (100.9F) and tachycardia (HR in 120s) in ED.  CTA negative for PE but significant for consolidative opacity in the left lower lobe, concerning for pneumonia. Currently the patient is receiving IV Ceftriaxone and Azithromycin (started ).  Patient was previously on Macrobid suppression for ESBL pyelonephritis. Macrobid was discontinued and patient is now on Bactrim for suppression (based on original urine culture sensitivities for prior ESBL). Urine culture on admission was negative.  Since last fever less than 24hr ago, we will continue with her current IV antibiotic regimen. Will consult ID for any further recommendations.     Today patient reported improvement of symptoms but continues to endorse coughing and shortness of breath. lPatient is currently afebrile with all other VSS wnl (HR 70-80s, 98% on room air). Labs wnl. Will start chest PT today. Continue with O2 monitoring.     Will continue with NST daily. Sonogram today demonstrated breech presentation, posterior placenta, MVP 3.86cm, BPP 8/8, EFW 984g (21%ile).     Discussed with DAVE Lopez attending  Rozina Hunt MD, PEREZM Fellow 33yo  at 27w2d, h/o ESBL pyelonephritis s/p admission in 2024, presented for fevers, URI symptoms, SOB and dyspnea on exertion, with imaging findings concerning for pneumonia ( admitted for IV antibiotics.     Patient presented with fevers with T max of 100.9 F, last fever on  at 00:34 (100.9F) and tachycardia (HR in 120s) in ED.  CTA negative for PE but significant for consolidative opacity in the left lower lobe, concerning for pneumonia. Currently the patient is receiving IV Ceftriaxone and Azithromycin (started ).  Patient was previously on Macrobid suppression for ESBL pyelonephritis. Macrobid was discontinued and patient is now on Bactrim for suppression (based on original urine culture sensitivities for prior ESBL). Urine culture on admission was negative.  Since last fever less than 24hr ago, we will continue with her current IV antibiotic regimen. Will consult ID for any further recommendations.     Today patient reported improvement of symptoms but continues to endorse coughing and shortness of breath. lPatient is currently afebrile with all other VSS wnl (HR 70-80s, 98% on room air). Labs wnl. Will start chest PT today. Continue with O2 monitoring. Follow up TTE.     Will continue with NST daily. Sonogram today demonstrated breech presentation, posterior placenta, MVP 3.86cm, BPP 8/8, EFW 984g (21%ile).     Discussed with PEREZ LopezM attending  Rozina Hunt MD, MFM Fellow 35yo  at 27w2d, h/o ESBL pyelonephritis s/p admission in 2024, presented for fevers, URI symptoms, SOB and dyspnea on exertion, with imaging findings concerning for pneumonia, admitted for IV antibiotics.     Patient presented with fevers with T max of 100.9 F, last fever on  at 00:34 (100.9F) and tachycardia (HR in 120s) in ED.  CTA negative for PE but significant for consolidative opacity in the left lower lobe, concerning for pneumonia. Currently the patient is receiving IV Ceftriaxone and Azithromycin (started ).  Patient was previously on Macrobid suppression for ESBL pyelonephritis. Macrobid was discontinued and patient is now on Bactrim for suppression (based on original urine culture sensitivities for prior ESBL). Urine culture on admission was negative.  Since last fever less than 24hr ago, we will continue with her current IV antibiotic regimen. Will consult ID for any further recommendations.     Today patient reported improvement of symptoms but continues to endorse coughing and shortness of breath. lPatient is currently afebrile with all other VSS wnl (HR 70-80s, 98% on room air). Labs wnl. Will start chest PT today. Continue with O2 monitoring. Follow up TTE.     Will continue with NST daily. Sonogram today demonstrated breech presentation, posterior placenta, MVP 3.86cm, BPP 8/8, EFW 984g (21%ile).     Discussed with DAVE Lopez attending  PEREZ Obrien MDM Fellow 35yo  at 27w2d, h/o ESBL pyelonephritis s/p admission in 2024, presented for fevers, URI symptoms, SOB and dyspnea on exertion, with imaging findings concerning for pneumonia, admitted for IV antibiotics.     Patient presented with fevers with T max of 100.9 F, last fever on  at 00:34 (100.9F) and tachycardia (HR in 120s) in ED.  CTA negative for PE but significant for consolidative opacity in the left lower lobe, concerning for pneumonia. Currently the patient is receiving IV Ceftriaxone and Azithromycin (started ).  Patient was previously on Macrobid suppression for ESBL pyelonephritis. Macrobid was discontinued and patient is now on Bactrim for suppression (based on original urine culture sensitivities for prior ESBL). Urine culture on admission was negative.      Today patient reported improvement of symptoms but continues to endorse coughing and shortness of breath. Patient is currently afebrile with all other VSS wnl (HR 70-80s, 98% on room air). Labs wnl. Since last fever less than 24hr ago, we will continue with her current IV antibiotic regimen. Will consult ID for any further recommendations. Will start chest PT today. Continue with O2 monitoring. Follow up TTE.     Will continue with NST daily. Sonogram today demonstrated breech presentation, posterior placenta, MVP 3.86cm, BPP 8/8, EFW 984g (21%ile).     Discussed with DAVE Lopez attending  Rozina Hunt MD, PEREZM Fellow

## 2024-07-08 NOTE — CONSULT NOTE ADULT - ASSESSMENT
ID #[id number]    35y/o  at 27w1d GA presented to the ED for SOB, dyspnea on exertion and dyspnea upon laying down x1 month. States she began having cough, sore throat, and fevers x2 days. Of note, she has +sick contacts at home; her son has been coughing for the past few weeks. Denies chest pain, palpitations, nausea, vomiting, abdominal pain, urinary complaints, constipation, diarrhea.   Denies contractions, vaginal bleeding, leakage of fluids. Reports good FM.     Prenatal course is significant for the following:    #History of PEC (on bASA, home BP monitoring, followed by Cardio OB and patient has a daily headache and neuro appointment on .     #Fetal Hypoplastic nasal bone (per transfer records from [facility])  -ATU (5/3): 4.3mm (6%); Nasal bone appears normal on  comprehensive sono    #Recurrent UTIs  -Patient states that from the beginning of this pregnancy she has been suffering from recurrent UTIs  -Admitted to [facility] at 6 wk (-2/15/24) with ESBL pyelonephritis, patient received Invanz x5 days  -UCx on  positive for E. Faecalis  - UC&S neg on  and  repeat UCx sent today ()  - Originally started on Keflex 500mg qD for suppression, though switched to Macrobid given Keflex with wider systemic effects    #History of Pituitary Adenoma/Headaches (7/10)  -Patient endorsed daily headaches that predated pregnancy  -H/a recurs after taking 1 tab of Tylenol; advised 2 tabs q 6 hr PRN  - on Magnesium Oxide for HA control    #Nausea and Vomiting of Pregnancy  - Admitted to [facility] at 14 wk (-24) for hematemesis. UTI was ruled out. D/c'd on Zofran disintegrating tabs; resolved with intermittent episodes of vomiting.    WILLIAM: 10/2/2024      OB:  () in [facility] 6#, pregnancy c/b PEC  GYN: +hx of ovarian cysts Denies fibroids, abnormal paps, STIs  PMHx: Constipation, HA  Meds: ASA, PNV, Magnesium, Macrobid  All: NKDA  Surgeries: Denies   (2024 07:35)  pt notes that she has been feeling SOB for 2 months, worse with lying down  Fevers only started this past friday  Pt  had chest pain that feels better since starting abs  pt is fro [facility]  Pt is unaware of any exposure to Tb  Pt's cough is non productive  Pt with a 12 year old son who had a recent cough and respiratory illness      A/P  #Pneumonia  agree with rocephin /azithromax  check BC x 2 sets  check sputum culture  if able  check HIV status  check mycoplasma IgM/IgG  check urine legionella  check procalcitonin  check chlamydia pneumonia serology  check urine streptococcal pneumonia antigen  check QuantiFeron TB  Why is patient on Bactrim ?      Cristina England M.D. ,   please reach via teams   If no answer, or after 5PM/ weekends,  then please call  [phone]    Assessment and plan discussed with the primary team .    ID #[id number]    35y/o  at 27w1d GA presented to the ED for SOB, dyspnea on exertion and dyspnea upon laying down x1 month. States she began having cough, sore throat, and fevers x2 days. Of note, she has +sick contacts at home; her son has been coughing for the past few weeks. Denies chest pain, palpitations, nausea, vomiting, abdominal pain, urinary complaints, constipation, diarrhea.   Denies contractions, vaginal bleeding, leakage of fluids. Reports good FM.     Prenatal course is significant for the following:    #History of PEC (on bASA, home BP monitoring, followed by Cardio OB and patient has a daily headache and neuro appointment on .     #Fetal Hypoplastic nasal bone (per transfer records from [facility])  -ATU (5/3): 4.3mm (6%); Nasal bone appears normal on  comprehensive sono    #Recurrent UTIs  -Patient states that from the beginning of this pregnancy she has been suffering from recurrent UTIs  -Admitted to [facility] at 6 wk (-2/15/24) with ESBL pyelonephritis, patient received Invanz x5 days  -UCx on  positive for E. Faecalis  - UC&S neg on  and  repeat UCx sent today ()  - Originally started on Keflex 500mg qD for suppression, though switched to Macrobid given Keflex with wider systemic effects    #History of Pituitary Adenoma/Headaches (7/10)  -Patient endorsed daily headaches that predated pregnancy  -H/a recurs after taking 1 tab of Tylenol; advised 2 tabs q 6 hr PRN  - on Magnesium Oxide for HA control    #Nausea and Vomiting of Pregnancy  - Admitted to [facility] at 14 wk (-24) for hematemesis. UTI was ruled out. D/c'd on Zofran disintegrating tabs; resolved with intermittent episodes of vomiting.    WILLIAM: 10/2/2024      OB:  () in [facility] 6#, pregnancy c/b PEC  GYN: +hx of ovarian cysts Denies fibroids, abnormal paps, STIs  PMHx: Constipation, HA  Meds: ASA, PNV, Magnesium, Macrobid  All: NKDA  Surgeries: Denies   (2024 07:35)  pt notes that she has been feeling SOB for 2 months, worse with lying down  Fevers only started this past friday  Pt  had chest pain that feels better since starting abs  pt is fro [facility]  Pt is unaware of any exposure to Tb  Pt's cough is non productive  Pt with a 12 year old son who had a recent cough and respiratory illness      A/P  #Pneumonia  agree with rocephin /azithromax  check BC x 2 sets  check sputum culture  if able  check HIV status  check mycoplasma IgM/IgG  check urine legionella  check procalcitonin  check chlamydia pneumonia serology  check urine streptococcal pneumonia antigen  check QuantiFeron TB    Pt is on Bactrim suppression for ESBL UTI, please discuss with pharmD about use in third trimester.    Would also consider cardiac echo  check [place] serology       Cristina England M.D. ,   please reach via teams   If no answer, or after 5PM/ weekends,  then please call  [phone]    Assessment and plan discussed with the primary team .

## 2024-07-08 NOTE — CONSULT NOTE ADULT - SUBJECTIVE AND OBJECTIVE BOX
Patient is a 34y old  Female who presents with a chief complaint of     HPI:  HPI     ID #[id number]    35y/o  at 27w1d GA presented to the ED for SOB, dyspnea on exertion and dyspnea upon laying down x1 month. States she began having cough, sore throat, and fevers x2 days. Of note, she has +sick contacts at home; her son has been coughing for the past few weeks. Denies chest pain, palpitations, nausea, vomiting, abdominal pain, urinary complaints, constipation, diarrhea.   Denies contractions, vaginal bleeding, leakage of fluids. Reports good FM.     Prenatal course is significant for the following:    #History of PEC (on bASA, home BP monitoring, followed by Cardio OB and patient has a daily headache and neuro appointment on .     #Fetal Hypoplastic nasal bone (per transfer records from [facility])  -ATU (5/3): 4.3mm (6%); Nasal bone appears normal on  comprehensive sono    #Recurrent UTIs  -Patient states that from the beginning of this pregnancy she has been suffering from recurrent UTIs  -Admitted to [facility] at 6 wk (-2/15/24) with ESBL pyelonephritis, patient received Invanz x5 days  -UCx on  positive for E. Faecalis  - UC&S neg on  and  repeat UCx sent today ()  - Originally started on Keflex 500mg qD for suppression, though switched to Macrobid given Keflex with wider systemic effects    #History of Pituitary Adenoma/Headaches (7/10)  -Patient endorsed daily headaches that predated pregnancy  -H/a recurs after taking 1 tab of Tylenol; advised 2 tabs q 6 hr PRN  - on Magnesium Oxide for HA control    #Nausea and Vomiting of Pregnancy  - Admitted to [facility] at 14 wk (-24) for hematemesis. UTI was ruled out. D/c'd on Zofran disintegrating tabs; resolved with intermittent episodes of vomiting.    WILLIAM: 10/2/2024      OB:  () in [facility] 6#, pregnancy c/b PEC  GYN: +hx of ovarian cysts Denies fibroids, abnormal paps, STIs  PMHx: Constipation, HA  Meds: ASA, PNV, Magnesium, Macrobid  All: NKDA  Surgeries: Denies   (2024 07:35)  pt notes that she has been feeling SOB for 2 months, worse with lying down  Fevers only started this past friday  Pt  had chest pain that feels better since starting abs  pt is fro [facility]  Pt is unaware of any exposure to Tb  Pt's cough is non productive  Pt with a 12 year old son who had a recent cough and respiratory illness      PAST MEDICAL & SURGICAL HISTORY:  PCOS (polycystic ovarian syndrome)      Helicobacter pylori gastritis      Migraines      Bacterial UTI          Social history:   Marital Status: has a significant other  Occupation:  works in a restaurant   Lives with:  significant other and son     Substance Use : denies  Tobacco Usage:  (  x ) never smoked   (   ) former smoker   (   ) current smoker  (     ) pack year  (        ) last tobacco use date  Alcohol Usage: denies  Travel: from [facility]  Pets: denies          FAMILY HISTORY:  mother has heart diasease  father with DM      REVIEW OF SYSTEMS  General:	fever    Skin:No rash  	  Ophthalmologic:Denies any visual complaints,discharge redness or photophobia  	  ENMT:No nasal discharge,headache,sinus congestion or throat pain.No dental complaints    Respiratory and Thorax cough some chest pain that improved.   	  Cardiovascular:	No chest pain,palpitaions or dizziness    Gastrointestinal:	 nausea     Genitourinary:	No dysuria,frequency. No flank pain    Musculoskeletal:	No joint swelling or pain.No weakness    Neurological:No confusion,diziness.No extremity weakness.No bladder or bowel incontinence	    Psychiatric:No delusions or hallucinations	    Hematology/Lymphatics:	No LN swelling.No gum bleeding     Allergic/Immunologic:	No hives     Allergies    No Known Allergies    Intolerances        Antimicrobials:       MEDICATIONS  (prior antimicrobials ):  azithromycin   Tablet   500 milliGRAM(s) Oral (24 @ 06:15)    azithromycin  IVPB   255 mL/Hr IV Intermittent (24 @ 05:29)    cefTRIAXone   IVPB   100 mL/Hr IV Intermittent (24 @ 04:43)    cefTRIAXone   IVPB   100 mL/Hr IV Intermittent (24 @ 05:11)    trimethoprim  160 mG/sulfamethoxazole 800 mG   1 Tablet(s) Oral (24 @ 12:03)   1 Tablet(s) Oral (24 @ 21:27)             azithromycin   Tablet 500 milliGRAM(s) Oral every 24 hours  cefTRIAXone   IVPB 1000 milliGRAM(s) IV Intermittent every 24 hours  trimethoprim  160 mG/sulfamethoxazole 800 mG 1 Tablet(s) Oral daily      MEDICATIONS  (STANDING):  azithromycin   Tablet 500 milliGRAM(s) Oral every 24 hours  cefTRIAXone   IVPB 1000 milliGRAM(s) IV Intermittent every 24 hours  chlorhexidine 2% Cloths 1 Application(s) Topical daily  heparin   Injectable 5000 Unit(s) SubCutaneous every 12 hours  prenatal multivitamin 1 Tablet(s) Oral daily  trimethoprim  160 mG/sulfamethoxazole 800 mG 1 Tablet(s) Oral daily    MEDICATIONS  (PRN):  acetaminophen     Tablet .. 650 milliGRAM(s) Oral every 6 hours PRN Temp greater or equal to 38.5C (101.3F), Mild Pain (1 - 3), Moderate Pain (4 - 6)  ondansetron   Disintegrating Tablet 4 milliGRAM(s) Oral every 6 hours PRN Nausea and/or Vomiting        Vital Signs Last 24 Hrs  T(C): 36.7 (2024 09:35), Max: 38.3 (2024 16:51)  T(F): 98.1 (2024 09:35), Max: 100.9 (2024 16:51)  HR: 76 (2024 09:35) (76 - 111)  BP: 93/60 (2024 09:35) (88/53 - 98/64)  BP(mean): --  RR: 18 (2024 09:35) (18 - 18)  SpO2: 98% (2024 09:35) (96% - 99%)    Parameters below as of 2024 09:35  Patient On (Oxygen Delivery Method): room air        PHYSICAL EXAM:Pleasant patient in no acute distress.      Constitutional:Comfortable.Awake and alert  No cachexia     Eyes:PERRL EOMI.NO discharge or conjunctival injection    ENMT:No sinus tenderness.No thrush.No pharyngeal exudate or erythema.Fair dental hygiene    Neck:Supple,No LN,no JVD      Respiratory:Good air entry bilaterally,CTA    Cardiovascular:S1 S2     Gastrointestinal:Soft BS(+) no tenderness  gravid  Genitourinary:No CVA tendereness     Extremities:No cyanosis,clubbing or edema.    Vascular:peripheral pulses felt    Neurological:AAO X 3,No grossly focal deficits    Skin:No rash     Lymph Nodes:No palpable LNs    Musculoskeletal:No joint swelling or LOM    Psychiatric:Affect normal.                                10.0   10.00 )-----------( 207      ( 2024 09:01 )             30.6     LIVER FUNCTIONS - ( 2024 22:52 )  Alb: 3.5 g/dL / Pro: 6.6 g/dL / ALK PHOS: 67 U/L / ALT: 22 U/L / AST: 20 U/L / GGT: x                 135  |  103  |  8   ----------------------------<  93  3.8   |  19<L>  |  0.56    Ca    8.7      2024 22:52    TPro  6.6  /  Alb  3.5  /  TBili  0.1<L>  /  DBili  x   /  AST  20  /  ALT  22  /  AlkPhos  67            Urinalysis Basic - ( 2024 23:14 )    Color: Yellow / Appearance: Clear / S.005 / pH: x  Gluc: x / Ketone: Negative mg/dL  / Bili: Negative / Urobili: 0.2 mg/dL   Blood: x / Protein: Negative mg/dL / Nitrite: Negative   Leuk Esterase: Small / RBC: 2 /HPF / WBC 4 /HPF   Sq Epi: x / Non Sq Epi: 5 /HPF / Bacteria: Few /HPF        Radiology:    < from: CT Angio Chest PE Protocol w/ IV Cont (24 @ 03:02) >    ACC: [id number] EXAM:  CT ANGIO CHEST PULM [facility]   ORDERED BY:  KRISTINA CAPONE     PROCEDURE DATE:  2024          INTERPRETATION:  CLINICAL INFORMATION: Fever. Patient pregnant.    COMPARISON: CT abdomen and pelvis 2023.    CONTRAST/COMPLICATIONS:  IV Contrast: Omnipaque 350  39 cc administered  Oral Contrast: NONE  Complications: None reported at time of study completion    PROCEDURE:  CT Angiography of the Chest.  Sagittal and coronal reformats were performed as well as 3D (MIP)   reconstructions.    FINDINGS:    LUNGS AND LARGE AIRWAYS: Patent central airways. Consolidative opacity in   the left lower lobe, concerning for developing pneumonia. Additional mild   bilateral dependent atelectasis.  PLEURA: No pleural effusion.  VESSELS: No pulmonary embolism.  HEART: Heart size is normal. No pericardial effusion.  MEDIASTINUM AND GURPREET: No lymphadenopathy.  CHEST WALL AND LOWER NECK: Within normal limits.  VISUALIZED UPPER ABDOMEN: Within normal limits.  BONES: Within normal limits.    IMPRESSION:    No pulmonary embolism.    Consolidative opacity in the left lower lobe, concerning for developing   pneumonia. Chest radiographic imaging in 6 weeks is advised to   demonstrate resolution.    These results were discussed via telephone at 2024 3:48 AM by Dr. Burgos of radiology with Dr. Cpaone.    --- End of Report ---            SYED BURGOS MD; Attending Radiologist  This document has been electronically s    < end of copied text >           Patient is a 34y old  Female who presents with a chief complaint of     HPI:  HPI     ID #[id number]    35y/o  at 27w1d GA presented to the ED for SOB, dyspnea on exertion and dyspnea upon laying down x1 month. States she began having cough, sore throat, and fevers x2 days. Of note, she has +sick contacts at home; her son has been coughing for the past few weeks. Denies chest pain, palpitations, nausea, vomiting, abdominal pain, urinary complaints, constipation, diarrhea.   Denies contractions, vaginal bleeding, leakage of fluids. Reports good FM.     Prenatal course is significant for the following:    #History of PEC (on bASA, home BP monitoring, followed by Cardio OB and patient has a daily headache and neuro appointment on .     #Fetal Hypoplastic nasal bone (per transfer records from [facility])  -ATU (5/3): 4.3mm (6%); Nasal bone appears normal on  comprehensive sono    #Recurrent UTIs  -Patient states that from the beginning of this pregnancy she has been suffering from recurrent UTIs  -Admitted to [facility] at 6 wk (-2/15/24) with ESBL pyelonephritis, patient received Invanz x5 days  -UCx on  positive for E. Faecalis  - UC&S neg on  and  repeat UCx sent today ()  - Originally started on Keflex 500mg qD for suppression, though switched to Macrobid given Keflex with wider systemic effects    #History of Pituitary Adenoma/Headaches (7/10)  -Patient endorsed daily headaches that predated pregnancy  -H/a recurs after taking 1 tab of Tylenol; advised 2 tabs q 6 hr PRN  - on Magnesium Oxide for HA control    #Nausea and Vomiting of Pregnancy  - Admitted to [facility] at 14 wk (-24) for hematemesis. UTI was ruled out. D/c'd on Zofran disintegrating tabs; resolved with intermittent episodes of vomiting.    WILLIAM: 10/2/2024      OB:  () in [facility] 6#, pregnancy c/b PEC  GYN: +hx of ovarian cysts Denies fibroids, abnormal paps, STIs  PMHx: Constipation, HA  Meds: ASA, PNV, Magnesium, Macrobid  All: NKDA  Surgeries: Denies   (2024 07:35)  pt notes that she has been feeling SOB for 2 months, worse with lying down  Fevers only started this past friday  Pt  had chest pain that feels better since starting abs  pt is fro [facility]  Pt is unaware of any exposure to Tb  Pt's cough is non productive  Pt with a 12 year old son who had a recent cough and respiratory illness      PAST MEDICAL & SURGICAL HISTORY:  PCOS (polycystic ovarian syndrome)      Helicobacter pylori gastritis      Migraines      Bacterial UTI          Social history:   Marital Status: has a significant other  Occupation:  works in a restaurant   Lives with:  significant other and son     Substance Use : denies  Tobacco Usage:  (  x ) never smoked   (   ) former smoker   (   ) current smoker  (     ) pack year  (        ) last tobacco use date  Alcohol Usage: denies  Travel: from [facility]  Pets: denies          FAMILY HISTORY:  mother has heart diasease  father with DM      REVIEW OF SYSTEMS  General:	fever    Skin:No rash  	  Ophthalmologic:Denies any visual complaints,discharge redness or photophobia  	  ENMT:No nasal discharge,headache,sinus congestion or throat pain.No dental complaints    Respiratory and Thorax cough some chest pain that improved.   	  Cardiovascular:	No chest pain,palpitaions or dizziness    Gastrointestinal:	 nausea     Genitourinary:	No dysuria,frequency. No flank pain    Musculoskeletal:	No joint swelling or pain.No weakness    Neurological:No confusion,diziness.No extremity weakness.No bladder or bowel incontinence	    Psychiatric:No delusions or hallucinations	    Hematology/Lymphatics:	No LN swelling.No gum bleeding     Allergic/Immunologic:	No hives     Allergies    No Known Allergies    Intolerances        Antimicrobials:       MEDICATIONS  (prior antimicrobials ):  azithromycin   Tablet   500 milliGRAM(s) Oral (24 @ 06:15)    azithromycin  IVPB   255 mL/Hr IV Intermittent (24 @ 05:29)    cefTRIAXone   IVPB   100 mL/Hr IV Intermittent (24 @ 04:43)    cefTRIAXone   IVPB   100 mL/Hr IV Intermittent (24 @ 05:11)    trimethoprim  160 mG/sulfamethoxazole 800 mG   1 Tablet(s) Oral (24 @ 12:03)   1 Tablet(s) Oral (24 @ 21:27)             azithromycin   Tablet 500 milliGRAM(s) Oral every 24 hours  cefTRIAXone   IVPB 1000 milliGRAM(s) IV Intermittent every 24 hours  trimethoprim  160 mG/sulfamethoxazole 800 mG 1 Tablet(s) Oral daily      MEDICATIONS  (STANDING):  azithromycin   Tablet 500 milliGRAM(s) Oral every 24 hours  cefTRIAXone   IVPB 1000 milliGRAM(s) IV Intermittent every 24 hours  chlorhexidine 2% Cloths 1 Application(s) Topical daily  heparin   Injectable 5000 Unit(s) SubCutaneous every 12 hours  prenatal multivitamin 1 Tablet(s) Oral daily  trimethoprim  160 mG/sulfamethoxazole 800 mG 1 Tablet(s) Oral daily    MEDICATIONS  (PRN):  acetaminophen     Tablet .. 650 milliGRAM(s) Oral every 6 hours PRN Temp greater or equal to 38.5C (101.3F), Mild Pain (1 - 3), Moderate Pain (4 - 6)  ondansetron   Disintegrating Tablet 4 milliGRAM(s) Oral every 6 hours PRN Nausea and/or Vomiting        Vital Signs Last 24 Hrs  T(C): 36.7 (2024 09:35), Max: 38.3 (2024 16:51)  T(F): 98.1 (2024 09:35), Max: 100.9 (2024 16:51)  HR: 76 (2024 09:35) (76 - 111)  BP: 93/60 (2024 09:35) (88/53 - 98/64)  BP(mean): --  RR: 18 (2024 09:35) (18 - 18)  SpO2: 98% (2024 09:35) (96% - 99%)    Parameters below as of 2024 09:35  Patient On (Oxygen Delivery Method): room air        PHYSICAL EXAM:Pleasant patient in no acute distress.      Constitutional:Comfortable.Awake and alert  No cachexia     Eyes:PERRL EOMI.NO discharge or conjunctival injection    ENMT:No sinus tenderness.No thrush.No pharyngeal exudate or erythema.Fair dental hygiene    Neck:Supple,No LN,no JVD      Respiratory:Good air entry bilaterally,CTA    Cardiovascular:S1 S2     Gastrointestinal:Soft BS(+) no tenderness  gravid  Genitourinary:No CVA tendereness     Extremities:No cyanosis,clubbing or edema.    Vascular:peripheral pulses felt    Neurological:AAO X 3,No grossly focal deficits    Skin:No rash     Lymph Nodes:No palpable LNs    Musculoskeletal:No joint swelling or LOM    Psychiatric:Affect normal.                                10.0   10.00 )-----------( 207      ( 2024 09:01 )             30.6     LIVER FUNCTIONS - ( 2024 22:52 )  Alb: 3.5 g/dL / Pro: 6.6 g/dL / ALK PHOS: 67 U/L / ALT: 22 U/L / AST: 20 U/L / GGT: x                 135  |  103  |  8   ----------------------------<  93  3.8   |  19<L>  |  0.56    Ca    8.7      2024 22:52    TPro  6.6  /  Alb  3.5  /  TBili  0.1<L>  /  DBili  x   /  AST  20  /  ALT  22  /  AlkPhos  67            Urinalysis Basic - ( 2024 23:14 )    Color: Yellow / Appearance: Clear / S.005 / pH: x  Gluc: x / Ketone: Negative mg/dL  / Bili: Negative / Urobili: 0.2 mg/dL   Blood: x / Protein: Negative mg/dL / Nitrite: Negative   Leuk Esterase: Small / RBC: 2 /HPF / WBC 4 /HPF   Sq Epi: x / Non Sq Epi: 5 /HPF / Bacteria: Few /HPF        Radiology:    < from: CT Angio Chest PE Protocol w/ IV Cont (24 @ 03:02) >    ACC: [id number] EXAM:  CT ANGIO CHEST PULM [facility]   ORDERED BY:  KRISTINA CAPONE     PROCEDURE DATE:  2024          INTERPRETATION:  CLINICAL INFORMATION: Fever. Patient pregnant.    COMPARISON: CT abdomen and pelvis 2023.    CONTRAST/COMPLICATIONS:  IV Contrast: Omnipaque 350  39 cc administered  Oral Contrast: NONE  Complications: None reported at time of study completion    PROCEDURE:  CT Angiography of the Chest.  Sagittal and coronal reformats were performed as well as 3D (MIP)   reconstructions.    FINDINGS:    LUNGS AND LARGE AIRWAYS: Patent central airways. Consolidative opacity in   the left lower lobe, concerning for developing pneumonia. Additional mild   bilateral dependent atelectasis.  PLEURA: No pleural effusion.  VESSELS: No pulmonary embolism.  HEART: Heart size is normal. No pericardial effusion.  MEDIASTINUM AND GURPREET: No lymphadenopathy.  CHEST WALL AND LOWER NECK: Within normal limits.  VISUALIZED UPPER ABDOMEN: Within normal limits.  BONES: Within normal limits.    IMPRESSION:    No pulmonary embolism.    Consolidative opacity in the left lower lobe, concerning for developing   pneumonia. Chest radiographic imaging in 6 weeks is advised to   demonstrate resolution.    These results were discussed via telephone at 2024 3:48 AM by Dr. Burgos of radiology with Dr. Capone.    --- End of Report ---            SYED BURGOS MD; Attending Radiologist  This document has been electronically s    < end of copied text >      Culture - Urine (24 @ 00:57)    -  Amoxicillin/Clavulanic Acid: I 16/8   -  Ampicillin: R >16 These ampicillin results predict results for amoxicillin   -  Ampicillin/Sulbactam: I 16/8   -  Aztreonam: R 16   -  Cefazolin: R >16 For uncomplicated UTI with K. pneumoniae, E. coli, or P. mirablis: DEO <=16 is sensitive and DEO >=32 is resistant. This also predicts results for oral agents cefaclor, cefdinir, cefpodoxime, cefprozil, cefuroxime axetil, cephalexin and locarbef for uncomplicated UTI. Note that some isolates may be susceptible to these agents while testing resistant to cefazolin.   -  Cefepime: R >16   -  Ceftriaxone: R >32   -  Cefuroxime: R >16   -  Ciprofloxacin: R >2   -  Ertapenem: S <=0.5   -  Gentamicin: R >8   -  Imipenem: S <=1   -  Levofloxacin: R >4   -  Meropenem: S <=1   -  Nitrofurantoin: I 64 Should not be used to treat pyelonephritis   -  Piperacillin/Tazobactam: S <=8   -  Tobramycin: R >8   -  Trimethoprim/Sulfamethoxazole: S <=0.5/9.5   Specimen Source: Clean Catch Clean Catch (Midstream)   Culture Results:   50,000 - 99,000 CFU/mL Escherichia coli ESBL  <10,000 CFU/ml Normal Urogenital juan jose present   Organism Identification: Escherichia coli ESBL   Organism: Escherichia coli ESBL   Method Type: DEO

## 2024-07-08 NOTE — PROGRESS NOTE ADULT - ATTENDING COMMENTS
Patient seen and evaluated with the resident and fellow. Admitted with pneumonia, febrile overnight and remains symptomatic. Plan to continue antibiotics and consult ID and chest PT. Fetal testing is reassuring and overall maternal status is stable.

## 2024-07-09 ENCOUNTER — APPOINTMENT (OUTPATIENT)
Dept: ANTEPARTUM | Facility: CLINIC | Age: 34
End: 2024-07-09
Payer: MEDICAID

## 2024-07-09 ENCOUNTER — TRANSCRIPTION ENCOUNTER (OUTPATIENT)
Age: 34
End: 2024-07-09

## 2024-07-09 ENCOUNTER — ASOB RESULT (OUTPATIENT)
Age: 34
End: 2024-07-09

## 2024-07-09 VITALS
HEART RATE: 75 BPM | SYSTOLIC BLOOD PRESSURE: 102 MMHG | OXYGEN SATURATION: 99 % | TEMPERATURE: 98 F | DIASTOLIC BLOOD PRESSURE: 63 MMHG | RESPIRATION RATE: 18 BRPM

## 2024-07-09 LAB
BASOPHILS # BLD AUTO: 0.02 K/UL — SIGNIFICANT CHANGE UP (ref 0–0.2)
BASOPHILS NFR BLD AUTO: 0.3 % — SIGNIFICANT CHANGE UP (ref 0–2)
CULTURE RESULTS: SIGNIFICANT CHANGE UP
EOSINOPHIL # BLD AUTO: 0.33 K/UL — SIGNIFICANT CHANGE UP (ref 0–0.5)
EOSINOPHIL NFR BLD AUTO: 4.9 % — SIGNIFICANT CHANGE UP (ref 0–6)
HCT VFR BLD CALC: 29.9 % — LOW (ref 34.5–45)
HGB BLD-MCNC: 10 G/DL — LOW (ref 11.5–15.5)
HIV 1+2 AB+HIV1 P24 AG SERPL QL IA: SIGNIFICANT CHANGE UP
IMM GRANULOCYTES NFR BLD AUTO: 1 % — HIGH (ref 0–0.9)
LEGIONELLA AG UR QL: NEGATIVE — SIGNIFICANT CHANGE UP
LYMPHOCYTES # BLD AUTO: 1.9 K/UL — SIGNIFICANT CHANGE UP (ref 1–3.3)
LYMPHOCYTES # BLD AUTO: 28 % — SIGNIFICANT CHANGE UP (ref 13–44)
M PNEUMO IGG SER IA-ACNC: 2.23 INDEX — HIGH (ref 0–0.9)
M PNEUMO IGG SER IA-ACNC: POSITIVE
M PNEUMO IGM SER-ACNC: 0.73 INDEX — SIGNIFICANT CHANGE UP (ref 0–0.9)
MCHC RBC-ENTMCNC: 30.3 PG — SIGNIFICANT CHANGE UP (ref 27–34)
MCHC RBC-ENTMCNC: 33.4 GM/DL — SIGNIFICANT CHANGE UP (ref 32–36)
MCV RBC AUTO: 90.6 FL — SIGNIFICANT CHANGE UP (ref 80–100)
MONOCYTES # BLD AUTO: 0.72 K/UL — SIGNIFICANT CHANGE UP (ref 0–0.9)
MONOCYTES NFR BLD AUTO: 10.6 % — SIGNIFICANT CHANGE UP (ref 2–14)
MRSA PCR RESULT.: SIGNIFICANT CHANGE UP
MYCOPLASMA AG SPEC QL: NEGATIVE — SIGNIFICANT CHANGE UP
NEUTROPHILS # BLD AUTO: 3.75 K/UL — SIGNIFICANT CHANGE UP (ref 1.8–7.4)
NEUTROPHILS NFR BLD AUTO: 55.2 % — SIGNIFICANT CHANGE UP (ref 43–77)
NRBC # BLD: 0 /100 WBCS — SIGNIFICANT CHANGE UP (ref 0–0)
PLATELET # BLD AUTO: 228 K/UL — SIGNIFICANT CHANGE UP (ref 150–400)
PROCALCITONIN SERPL-MCNC: 0.04 NG/ML — SIGNIFICANT CHANGE UP (ref 0.02–0.1)
RBC # BLD: 3.3 M/UL — LOW (ref 3.8–5.2)
RBC # FLD: 13.4 % — SIGNIFICANT CHANGE UP (ref 10.3–14.5)
S AUREUS DNA NOSE QL NAA+PROBE: SIGNIFICANT CHANGE UP
S PNEUM AG UR QL: NEGATIVE — SIGNIFICANT CHANGE UP
SPECIMEN SOURCE: SIGNIFICANT CHANGE UP
WBC # BLD: 6.79 K/UL — SIGNIFICANT CHANGE UP (ref 3.8–10.5)
WBC # FLD AUTO: 6.79 K/UL — SIGNIFICANT CHANGE UP (ref 3.8–10.5)

## 2024-07-09 PROCEDURE — 85025 COMPLETE CBC W/AUTO DIFF WBC: CPT

## 2024-07-09 PROCEDURE — 81001 URINALYSIS AUTO W/SCOPE: CPT

## 2024-07-09 PROCEDURE — 87641 MR-STAPH DNA AMP PROBE: CPT

## 2024-07-09 PROCEDURE — 84132 ASSAY OF SERUM POTASSIUM: CPT

## 2024-07-09 PROCEDURE — 86738 MYCOPLASMA ANTIBODY: CPT

## 2024-07-09 PROCEDURE — 83880 ASSAY OF NATRIURETIC PEPTIDE: CPT

## 2024-07-09 PROCEDURE — 85379 FIBRIN DEGRADATION QUANT: CPT

## 2024-07-09 PROCEDURE — 85730 THROMBOPLASTIN TIME PARTIAL: CPT

## 2024-07-09 PROCEDURE — 36415 COLL VENOUS BLD VENIPUNCTURE: CPT

## 2024-07-09 PROCEDURE — 87449 NOS EACH ORGANISM AG IA: CPT

## 2024-07-09 PROCEDURE — 82435 ASSAY OF BLOOD CHLORIDE: CPT

## 2024-07-09 PROCEDURE — 96374 THER/PROPH/DIAG INJ IV PUSH: CPT

## 2024-07-09 PROCEDURE — 87899 AGENT NOS ASSAY W/OPTIC: CPT

## 2024-07-09 PROCEDURE — 84484 ASSAY OF TROPONIN QUANT: CPT

## 2024-07-09 PROCEDURE — 86901 BLOOD TYPING SEROLOGIC RH(D): CPT

## 2024-07-09 PROCEDURE — 86480 TB TEST CELL IMMUN MEASURE: CPT

## 2024-07-09 PROCEDURE — 86632 CHLAMYDIA IGM ANTIBODY: CPT

## 2024-07-09 PROCEDURE — 82947 ASSAY GLUCOSE BLOOD QUANT: CPT

## 2024-07-09 PROCEDURE — 80053 COMPREHEN METABOLIC PANEL: CPT

## 2024-07-09 PROCEDURE — 99285 EMERGENCY DEPT VISIT HI MDM: CPT | Mod: 25

## 2024-07-09 PROCEDURE — 86631 CHLAMYDIA ANTIBODY: CPT

## 2024-07-09 PROCEDURE — 87389 HIV-1 AG W/HIV-1&-2 AB AG IA: CPT

## 2024-07-09 PROCEDURE — 85018 HEMOGLOBIN: CPT

## 2024-07-09 PROCEDURE — 93306 TTE W/DOPPLER COMPLETE: CPT

## 2024-07-09 PROCEDURE — 85610 PROTHROMBIN TIME: CPT

## 2024-07-09 PROCEDURE — 87207 SMEAR SPECIAL STAIN: CPT

## 2024-07-09 PROCEDURE — 93356 MYOCRD STRAIN IMG SPCKL TRCK: CPT

## 2024-07-09 PROCEDURE — 85014 HEMATOCRIT: CPT

## 2024-07-09 PROCEDURE — 86850 RBC ANTIBODY SCREEN: CPT

## 2024-07-09 PROCEDURE — 82803 BLOOD GASES ANY COMBINATION: CPT

## 2024-07-09 PROCEDURE — 84145 PROCALCITONIN (PCT): CPT

## 2024-07-09 PROCEDURE — 84702 CHORIONIC GONADOTROPIN TEST: CPT

## 2024-07-09 PROCEDURE — 86753 PROTOZOA ANTIBODY NOS: CPT

## 2024-07-09 PROCEDURE — 83605 ASSAY OF LACTIC ACID: CPT

## 2024-07-09 PROCEDURE — 87637 SARSCOV2&INF A&B&RSV AMP PRB: CPT

## 2024-07-09 PROCEDURE — 86900 BLOOD TYPING SEROLOGIC ABO: CPT

## 2024-07-09 PROCEDURE — 71275 CT ANGIOGRAPHY CHEST: CPT | Mod: MC

## 2024-07-09 PROCEDURE — 76818 FETAL BIOPHYS PROFILE W/NST: CPT | Mod: 26

## 2024-07-09 PROCEDURE — 87040 BLOOD CULTURE FOR BACTERIA: CPT

## 2024-07-09 PROCEDURE — 84295 ASSAY OF SERUM SODIUM: CPT

## 2024-07-09 PROCEDURE — 87086 URINE CULTURE/COLONY COUNT: CPT

## 2024-07-09 PROCEDURE — 82330 ASSAY OF CALCIUM: CPT

## 2024-07-09 PROCEDURE — 87640 STAPH A DNA AMP PROBE: CPT

## 2024-07-09 PROCEDURE — 96375 TX/PRO/DX INJ NEW DRUG ADDON: CPT

## 2024-07-09 PROCEDURE — 93005 ELECTROCARDIOGRAM TRACING: CPT

## 2024-07-09 RX ORDER — CEFUROXIME SODIUM 7.5 G
1 VIAL (EA) INTRAVENOUS
Qty: 14 | Refills: 0
Start: 2024-07-09 | End: 2024-07-15

## 2024-07-09 RX ORDER — ASPIRIN 325 MG/1
2 TABLET, FILM COATED ORAL
Qty: 0 | Refills: 0 | DISCHARGE
Start: 2024-07-09

## 2024-07-09 RX ORDER — AZITHROMYCIN 250 MG/1
1 TABLET, FILM COATED ORAL
Qty: 3 | Refills: 0
Start: 2024-07-09 | End: 2024-07-11

## 2024-07-09 RX ORDER — NITROFURANTOIN MACROCRYSTAL 100 MG
1 CAPSULE ORAL
Qty: 60 | Refills: 1
Start: 2024-07-09 | End: 2024-09-06

## 2024-07-09 RX ADMIN — AZITHROMYCIN 500 MILLIGRAM(S): 250 TABLET, FILM COATED ORAL at 05:26

## 2024-07-09 RX ADMIN — Medication 100 MILLIGRAM(S): at 05:26

## 2024-07-09 RX ADMIN — ASPIRIN 162 MILLIGRAM(S): 325 TABLET, FILM COATED ORAL at 12:16

## 2024-07-09 RX ADMIN — HEPARIN SODIUM 5000 UNIT(S): 50 INJECTION, SOLUTION INTRAVENOUS at 08:48

## 2024-07-09 NOTE — DISCHARGE NOTE ANTEPARTUM - PLAN OF CARE
Return to care with contractions, rupture of membranes, vaginal bleeding, decreased FM    Physician Notification- Warning signs to look out for- Return to care  •	Heavy Vaginal Bleeding   •	Shortness of breath or chest pain  •	Severe Abdominal Pain  •	Persistent nausea and vomiting  •	Pain not relieved by medications  •	Fever greater than 100.4®F  •	Inability to tolerate liquids or foods  •	Unable to urinate after 8 hours

## 2024-07-09 NOTE — DISCHARGE NOTE ANTEPARTUM - HOSPITAL COURSE
33y/o  at 27w2d GA, prenatal course complicated by recurrent UTIs with admission for ESBL pyelonephritis on Macrobid suppression, presenting with x1 month of SOB and dyspnea on exertion associated with cough, sore throat, and fevers x3 days on . Was found to be febrile 100.5F and tachycardic in 120s in the ED om  .  CTA PE significant for consolidative opacity in the left lower lobe, concerning for pneumonia. S/p Zithromax and CTX in the ED. Currently on Azithromycin(-) and CTX(-) for pneumonia tx and Macrobid for suppression therapy given past ESBL UTI in February resistant to CTX.

## 2024-07-09 NOTE — PROGRESS NOTE ADULT - SUBJECTIVE AND OBJECTIVE BOX
R3 Antepartum Note, HD#    Patient seen and examined at bedside, no acute overnight events. No acute complaints. Pt reports +FM, denies LOF, VB, ctx, HA, epigastric pain, blurred vision, CP, SOB, N/V, fevers, and chills.    Vital Signs Last 24 Hours  T(C): 36.8 (07-08-24 @ 05:54), Max: 38.3 (07-07-24 @ 16:51)  HR: 80 (07-08-24 @ 05:54) (80 - 111)  BP: 89/54 (07-08-24 @ 05:54) (88/53 - 98/64)  RR: 18 (07-08-24 @ 05:54) (18 - 20)  SpO2: 98% (07-08-24 @ 05:54) (96% - 100%)    CAPILLARY BLOOD GLUCOSE          Physical Exam:  General: NAD  Abdomen: Soft, non-tender, gravid  Ext: No pain or swelling    NST reactive overnight    Labs:             9.2    9.14  )-----------( 208      ( 07-07 @ 08:58 )             28.2     07-06 @ 22:52    135  |  103  |  8   ----------------------------<  93  3.8   |  19  |  0.56    Ca    8.7      07-06 @ 22:52    TPro  6.6  /  Alb  3.5  /  TBili  0.1  /  DBili  x   /  AST  20  /  ALT  22  /  AlkPhos  67  07-06 @ 22:52    PT/INR - ( 07-06 @ 22:52 )   PT: 10.7 sec;   INR: 1.02 ratio    PTT - ( 07-06 @ 22:52 )  PTT:25.8 sec        MEDICATIONS  (STANDING):  azithromycin   Tablet 500 milliGRAM(s) Oral every 24 hours  cefTRIAXone   IVPB 1000 milliGRAM(s) IV Intermittent every 24 hours  heparin   Injectable 5000 Unit(s) SubCutaneous every 12 hours  prenatal multivitamin 1 Tablet(s) Oral daily  trimethoprim  160 mG/sulfamethoxazole 800 mG 1 Tablet(s) Oral daily    MEDICATIONS  (PRN):  acetaminophen     Tablet .. 650 milliGRAM(s) Oral every 6 hours PRN Temp greater or equal to 38.5C (101.3F), Mild Pain (1 - 3), Moderate Pain (4 - 6)  ondansetron   Disintegrating Tablet 4 milliGRAM(s) Oral every 6 hours PRN Nausea and/or Vomiting  
R3 Antepartum Note, HD#    Patient seen and examined at bedside, no acute overnight events. No acute complaints. Pt reports +FM, denies LOF, VB, ctx, HA, epigastric pain, blurred vision, CP, SOB, N/V, fevers, and chills.    Vital Signs Last 24 Hours  T(C): 36.6 (07-09-24 @ 06:23), Max: 36.7 (07-08-24 @ 09:35)  HR: 70 (07-09-24 @ 06:23) (70 - 85)  BP: 90/58 (07-09-24 @ 06:23) (90/58 - 102/69)  RR: 18 (07-09-24 @ 06:23) (18 - 18)  SpO2: 95% (07-09-24 @ 06:23) (95% - 99%)    CAPILLARY BLOOD GLUCOSE          Physical Exam:  General: NAD  Abdomen: Soft, non-tender, gravid  Ext: No pain or swelling    NST reactive overnight    Labs:             10.0   10.00 )-----------( 207      ( 07-08 @ 09:01 )             30.6           PT/INR - ( 07-06 @ 22:52 )   PT: 10.7 sec;   INR: 1.02 ratio    PTT - ( 07-06 @ 22:52 )  PTT:25.8 sec        MEDICATIONS  (STANDING):  aspirin  chewable 162 milliGRAM(s) Oral daily  azithromycin   Tablet 500 milliGRAM(s) Oral every 24 hours  cefTRIAXone   IVPB 1000 milliGRAM(s) IV Intermittent every 24 hours  chlorhexidine 2% Cloths 1 Application(s) Topical daily  heparin   Injectable 5000 Unit(s) SubCutaneous every 12 hours  prenatal multivitamin 1 Tablet(s) Oral daily  trimethoprim  160 mG/sulfamethoxazole 800 mG 1 Tablet(s) Oral daily    MEDICATIONS  (PRN):  acetaminophen     Tablet .. 650 milliGRAM(s) Oral every 6 hours PRN Temp greater or equal to 38.5C (101.3F), Mild Pain (1 - 3), Moderate Pain (4 - 6)  ondansetron   Disintegrating Tablet 4 milliGRAM(s) Oral every 6 hours PRN Nausea and/or Vomiting

## 2024-07-09 NOTE — DISCHARGE NOTE ANTEPARTUM - CARE PLAN
Principal Discharge DX:	Pneumonia  Assessment and plan of treatment:	Return to care with contractions, rupture of membranes, vaginal bleeding, decreased FM    Physician Notification- Warning signs to look out for- Return to care  •	Heavy Vaginal Bleeding   •	Shortness of breath or chest pain  •	Severe Abdominal Pain  •	Persistent nausea and vomiting  •	Pain not relieved by medications  •	Fever greater than 100.4®F  •	Inability to tolerate liquids or foods  •	Unable to urinate after 8 hours   1

## 2024-07-09 NOTE — DISCHARGE NOTE ANTEPARTUM - MEDICATION SUMMARY - MEDICATIONS TO TAKE
I will START or STAY ON the medications listed below when I get home from the hospital:    aspirin 81 mg oral tablet, chewable  -- 2 tab(s) by mouth once a day  -- Indication: For home med    ondansetron 4 mg oral tablet, disintegrating  -- 1 tab(s) by mouth every 12 hours as needed for  nausea  -- Indication: For home med    doxylamine 25 mg oral tablet  -- 1 tab(s) by mouth once a day (at bedtime) MDD: 1  -- Indication: For home med    cefuroxime 500 mg oral tablet  -- 1 tab(s) by mouth every 12 hours  -- Indication: For Pneumonia    famotidine 20 mg oral tablet  -- 1 tab(s) by mouth once a day  -- Indication: For home med    Prenatal Multivitamins with Folic Acid 1 mg oral tablet  -- 1 tab(s) by mouth once a day  -- Indication: For home med    Senna 8.6 mg oral tablet  -- 2 tab(s) by mouth once a day (at bedtime) MDD: 2  -- Indication: For home med    MiraLax oral powder for reconstitution  -- 17 gram(s) by mouth once a day (at bedtime)  -- Indication: For home med    azithromycin 500 mg oral tablet  -- 1 tab(s) by mouth every 24 hours  -- Indication: For Pneumonia    Macrobid 100 mg oral capsule  -- 1 cap(s) by mouth every 12 hours  -- Indication: For UTI    pyridoxine 25 mg oral tablet  -- 2 tab(s) by mouth every 24 hours  -- Indication: For home med

## 2024-07-09 NOTE — DISCHARGE NOTE ANTEPARTUM - PATIENT PORTAL LINK FT
You can access the FollowMyHealth Patient Portal offered by [facility] by registering at the following website: [email or web address]. By joining [facility]’s FollowMyHealth portal, you will also be able to view your health information using other applications (apps) compatible with our system.

## 2024-07-09 NOTE — DISCHARGE NOTE ANTEPARTUM - CARE PROVIDER_API CALL
NS, HRC  [facility]/GYN Services at the [facility]  [phone]  Fax: [phone]  [address]  [place] [address]  Phone: (   )    -  Fax: (   )    -  Scheduled Appointment: 07/12/2024

## 2024-07-09 NOTE — PROGRESS NOTE ADULT - NSPROGADDITIONALINFOA_GEN_ALL_CORE
MFM Fellow Addendum:   35yo  at 27w3d, h/o ESBL pyelonephritis s/p admission in 2024, presented for fevers, URI symptoms, SOB and dyspnea on exertion, with imaging findings concerning for pneumonia, admitted for IV antibiotics.     Patient presented with fevers with T max of 100.9 F, last fever on  at 00:34 (100.9F) and tachycardia (HR in 120s) in ED. CTA negative for PE but significant for consolidative opacity in the left lower lobe, concerning for pneumonia. TTE negative. Patient received IV Ceftriaxone and Azithromycin now for >48hr.     Patient was previously on Macrobid suppression for ESBL pyelonephritis and was switched to Bactrim for suppression (based on original urine culture sensitivities for prior ESBL). Urine culture on admission was negative. As ucx neg, we decided to continue with macrobid for suppression instead due to fetal risks of bactrim in 3rd trimester. Recommend close follow up with repeat urine cultures while on macrobid suppression due to h/o ESBL.     Today patient reported significant improvement of symptoms. Remains afebrile for >24hr. VSS. Appreciate ID recs for abx and workup (legionella neg, bcx x2 NGTD, procalcitonin wnl, Strep pneumonia ag neg). Further workup of atypical PNA still pending. Spoke to ID attending,  regarding dispo planning and recommended outpatient PO abx regimen of cefuroxime 500mg BID, azithromycin 500mg qd, and macrobid 100mg BID for suppression.     NST reactive and sono done today (breech, post, KARIME 10.38, MVP 4.16, BPP 8/8). Patient will follow up with HRC this Friday. Will continue to f/u pending ID labs.     Discussed with Dr.Tam Sims, PEREZM attending  Rozina Hunt MD, MFM Fellow MFM Fellow Addendum:   35yo  at 27w3d, h/o ESBL pyelonephritis s/p admission in 2024, presented for fevers, URI symptoms, SOB and dyspnea on exertion, with imaging findings concerning for pneumonia, admitted for IV antibiotics. Patient presented with fevers with T max of 100.9 F, last fever on  at 00:34 (100.9F) and tachycardia (HR in 120s) in ED. CTA negative for PE but significant for consolidative opacity in the left lower lobe, concerning for pneumonia. TTE negative. Patient received IV Ceftriaxone and Azithromycin now for >48hr.     Patient was previously on Macrobid suppression for ESBL pyelonephritis and was switched to Bactrim for suppression (based on original urine culture sensitivities for prior ESBL). Urine culture on admission was negative. As ucx neg, we decided to continue with macrobid for suppression instead due to fetal risks of bactrim in 3rd trimester. Recommend close follow up with repeat urine cultures while on macrobid suppression due to h/o ESBL.     Today patient reported significant improvement of symptoms. Remains afebrile for >24hr. VSS. Appreciate ID recs for abx and workup (legionella neg, bcx x2 NGTD, procalcitonin wnl, Strep pneumonia ag neg). Further workup of atypical PNA still pending. Spoke to ID attending,  regarding dispo planning and recommended outpatient PO abx regimen of cefuroxime 500mg BID, azithromycin 500mg qd, and macrobid 100mg BID for suppression. Patient is stable for discharge today with PO abx.     NST reactive and sono done today (breech, post, KARIME 10.38, MVP 4.16, BPP 8/8). Patient will follow up with HRC this Friday. Will continue to f/u pending ID labs.     Discussed with Dr.Tam Sims, PEREZM attending  Rozina Hunt MD, MFM Fellow

## 2024-07-09 NOTE — DISCHARGE NOTE ANTEPARTUM - PROVIDER TOKENS
FREE:[LAST:[NS],FIRST:[HRC],PHONE:[(   )    -],FAX:[(   )    -],ADDRESS:[[facility] OB/[facility] Services at the [facility]  [phone]  Fax: [phone] [address], [address]  [place] [address]],SCHEDULEDAPPT:[07/12/2024]]

## 2024-07-09 NOTE — PROGRESS NOTE ADULT - ASSESSMENT
35y/o  at 27w2d GA, prenatal course complicated by recurrent UTIs with admission for ESBL pyelonephritis on Macrobid suppression, presenting with x1 month of SOB and dyspnea on exertion associated with cough, sore throat, and fevers x3 days on . Was found to be febrile 100.5F and tachycardic in 120s in the ED om  .  CTA PE significant for consolidative opacity in the left lower lobe, concerning for pneumonia. S/p Zithromax and CTX in the ED. Currently on Azithromycin(-) and CTX(-) for pneumonia tx and Macrobid for suppression therapy given past ESBL UTI in February resistant to CTX.     Plan:    #Suspected Pneumonia  - Continue Zithromax 500mg q24hrs hrs and CTX 1g q24hrs  - ID Consulted: agree with rocephin /azithromax, BC x 2 sets, check sputum culture  if able, HIV status, mycoplasma IgM/IgG, urine legionella, procalcitonin, chlamydia pneumonia serology, urine streptococcal pneumonia antigen, QuantiFeron TB. Pt is on Bactrim suppression for ESBL UTI, please discuss with pharmD about use in third trimester. Would also consider cardiac echo. check Chagas serology   -ECO  wnl EF: 69    #Recurrent UTIs  - Continue Bactrim for suppression, discuss outpt management   - UA shows no evidence of UTI (neg LE, neg Nitrites)  -f/u Ucx (): 10K normal juan jose    #Fetal wellbeing  - NST BID      #Maternal wellbeing  - Regular diet  - HSQ/SCDs for DVT ppx  - PNV  -GCT: from  114 wnl    DTaveras PGY3  
· Assessment    33y/o  at 27w2d GA, prenatal course complicated by recurrent UTIs with admission for ESBL pyelonephritis on Macrobid suppression, presenting with x1 month of SOB and dyspnea on exertion associated with cough, sore throat, and fevers x3 days on . Was found to be febrile 100.5F and tachycardic in 120s in the ED om  .  CTA PE significant for consolidative opacity in the left lower lobe, concerning for pneumonia. S/p Zithromax and CTX in the ED. Currently on Azithromycin(-) and CTX(-) for pneumonia tx and Macrobid for suppression therapy given past ESBL UTI in February resistant to CTX.     Plan:    #Suspected Pneumonia  - Continue Zithromax 500mg q24hrs hrs and CTX 1g q24hrs  - NST BID  - Continuous O2     #Recurrent UTIs  - Continue Macrobid suppression  - UA shows no evidence of UTI (neg LE, neg Nitrites)  -f/u Ucx ()    #Fetal wellbeing  - NST BID  -Consider ATU scan this admission    #Maternal wellbeing  - Regular diet  - HSQ/SCDs for DVT ppx  - PNV  -GCT: from  114 wnl    [facility] PGY3

## 2024-07-10 LAB
GAMMA INTERFERON BACKGROUND BLD IA-ACNC: 0.06 IU/ML — SIGNIFICANT CHANGE UP
M TB IFN-G BLD-IMP: NEGATIVE — SIGNIFICANT CHANGE UP
M TB IFN-G CD4+ BCKGRND COR BLD-ACNC: 0.18 IU/ML — SIGNIFICANT CHANGE UP
M TB IFN-G CD4+CD8+ BCKGRND COR BLD-ACNC: 0.15 IU/ML — SIGNIFICANT CHANGE UP
QUANT TB PLUS MITOGEN MINUS NIL: 1.32 IU/ML — SIGNIFICANT CHANGE UP

## 2024-07-11 ENCOUNTER — NON-APPOINTMENT (OUTPATIENT)
Age: 34
End: 2024-07-11

## 2024-07-12 ENCOUNTER — ASOB RESULT (OUTPATIENT)
Age: 34
End: 2024-07-12

## 2024-07-12 ENCOUNTER — APPOINTMENT (OUTPATIENT)
Dept: ANTEPARTUM | Facility: CLINIC | Age: 34
End: 2024-07-12

## 2024-07-12 ENCOUNTER — NON-APPOINTMENT (OUTPATIENT)
Age: 34
End: 2024-07-12

## 2024-07-12 ENCOUNTER — APPOINTMENT (OUTPATIENT)
Dept: ANTEPARTUM | Facility: CLINIC | Age: 34
End: 2024-07-12
Payer: MEDICAID

## 2024-07-12 ENCOUNTER — APPOINTMENT (OUTPATIENT)
Dept: MATERNAL FETAL MEDICINE | Facility: CLINIC | Age: 34
End: 2024-07-12
Payer: MEDICAID

## 2024-07-12 ENCOUNTER — OUTPATIENT (OUTPATIENT)
Dept: OUTPATIENT SERVICES | Facility: HOSPITAL | Age: 34
LOS: 1 days | End: 2024-07-12

## 2024-07-12 VITALS
SYSTOLIC BLOOD PRESSURE: 108 MMHG | WEIGHT: 153 LBS | HEIGHT: 60 IN | OXYGEN SATURATION: 99 % | HEART RATE: 75 BPM | BODY MASS INDEX: 30.04 KG/M2 | DIASTOLIC BLOOD PRESSURE: 70 MMHG

## 2024-07-12 DIAGNOSIS — O09.899 SUPERVISION OF OTHER HIGH RISK PREGNANCIES, UNSPECIFIED TRIMESTER: ICD-10-CM

## 2024-07-12 LAB
C PNEUM IGM TITR SER: SIGNIFICANT CHANGE UP
CHLAMYDIA AB SER-ACNC: SIGNIFICANT CHANGE UP
CHLAMYDIA IGG SER-ACNC: SIGNIFICANT CHANGE UP
CHLAMYDIA PNEUMONIAE IGA: SIGNIFICANT CHANGE UP
T CRUZI AB SER-ACNC: SIGNIFICANT CHANGE UP

## 2024-07-12 PROCEDURE — 76818 FETAL BIOPHYS PROFILE W/NST: CPT | Mod: 26

## 2024-07-12 PROCEDURE — G0463: CPT

## 2024-07-12 PROCEDURE — 99213 OFFICE O/P EST LOW 20 MIN: CPT | Mod: TH,GC

## 2024-07-12 PROCEDURE — ZZZZZ: CPT

## 2024-07-12 PROCEDURE — 76817 TRANSVAGINAL US OBSTETRIC: CPT | Mod: 26

## 2024-07-13 ENCOUNTER — TRANSCRIPTION ENCOUNTER (OUTPATIENT)
Age: 34
End: 2024-07-13

## 2024-07-13 ENCOUNTER — ASOB RESULT (OUTPATIENT)
Age: 34
End: 2024-07-13

## 2024-07-13 ENCOUNTER — APPOINTMENT (OUTPATIENT)
Dept: ANTEPARTUM | Facility: CLINIC | Age: 34
End: 2024-07-13

## 2024-07-15 ENCOUNTER — NON-APPOINTMENT (OUTPATIENT)
Age: 34
End: 2024-07-15

## 2024-07-15 ENCOUNTER — APPOINTMENT (OUTPATIENT)
Dept: PULMONOLOGY | Facility: CLINIC | Age: 34
End: 2024-07-15

## 2024-07-15 PROBLEM — Z87.01 PERSONAL HISTORY OF PNEUMONIA (RECURRENT): Chronic | Status: ACTIVE | Noted: 2024-07-12

## 2024-07-16 ENCOUNTER — NON-APPOINTMENT (OUTPATIENT)
Age: 34
End: 2024-07-16

## 2024-07-18 ENCOUNTER — NON-APPOINTMENT (OUTPATIENT)
Age: 34
End: 2024-07-18

## 2024-07-19 ENCOUNTER — APPOINTMENT (OUTPATIENT)
Dept: MATERNAL FETAL MEDICINE | Facility: CLINIC | Age: 34
End: 2024-07-19

## 2024-07-19 ENCOUNTER — OUTPATIENT (OUTPATIENT)
Dept: OUTPATIENT SERVICES | Facility: HOSPITAL | Age: 34
LOS: 1 days | End: 2024-07-19

## 2024-07-19 ENCOUNTER — MED ADMIN CHARGE (OUTPATIENT)
Age: 34
End: 2024-07-19

## 2024-07-19 VITALS
DIASTOLIC BLOOD PRESSURE: 63 MMHG | OXYGEN SATURATION: 98 % | HEART RATE: 72 BPM | HEIGHT: 60 IN | SYSTOLIC BLOOD PRESSURE: 102 MMHG | BODY MASS INDEX: 30.06 KG/M2 | WEIGHT: 153.13 LBS

## 2024-07-19 DIAGNOSIS — O09.899 SUPERVISION OF OTHER HIGH RISK PREGNANCIES, UNSPECIFIED TRIMESTER: ICD-10-CM

## 2024-07-19 DIAGNOSIS — O99.519 DISEASES OF THE RESPIRATORY SYSTEM COMPLICATING PREGNANCY, UNSPECIFIED TRIMESTER: ICD-10-CM

## 2024-07-19 DIAGNOSIS — N39.0 URINARY TRACT INFECTION, SITE NOT SPECIFIED: ICD-10-CM

## 2024-07-19 DIAGNOSIS — O09.90 SUPERVISION OF HIGH RISK PREGNANCY, UNSPECIFIED, UNSPECIFIED TRIMESTER: ICD-10-CM

## 2024-07-19 DIAGNOSIS — O09.299 SUPERVISION OF PREGNANCY WITH OTHER POOR REPRODUCTIVE OR OBSTETRIC HISTORY, UNSPECIFIED TRIMESTER: ICD-10-CM

## 2024-07-19 DIAGNOSIS — B57.2 CHAGAS' DISEASE (CHRONIC) WITH HEART INVOLVEMENT: ICD-10-CM

## 2024-07-19 DIAGNOSIS — J18.9 DISEASES OF THE RESPIRATORY SYSTEM COMPLICATING PREGNANCY, UNSPECIFIED TRIMESTER: ICD-10-CM

## 2024-07-19 LAB
BILIRUB UR QL STRIP: NORMAL
COLLECTION METHOD: NORMAL
GLUCOSE UR-MCNC: NORMAL
HCG UR QL: 0.2 EU/DL
HGB UR QL STRIP.AUTO: NORMAL
KETONES UR-MCNC: NORMAL
LEUKOCYTE ESTERASE UR QL STRIP: NORMAL
NITRITE UR QL STRIP: NORMAL
PH UR STRIP: 6.5
PROT UR STRIP-MCNC: NORMAL
SP GR UR STRIP: 1.02

## 2024-07-19 PROCEDURE — 90471 IMMUNIZATION ADMIN: CPT

## 2024-07-19 PROCEDURE — G0463: CPT

## 2024-07-19 PROCEDURE — 90715 TDAP VACCINE 7 YRS/> IM: CPT

## 2024-07-19 PROCEDURE — 99213 OFFICE O/P EST LOW 20 MIN: CPT | Mod: TH,25,GC

## 2024-07-23 ENCOUNTER — APPOINTMENT (OUTPATIENT)
Dept: GASTROENTEROLOGY | Facility: HOSPITAL | Age: 34
End: 2024-07-23

## 2024-07-29 ENCOUNTER — NON-APPOINTMENT (OUTPATIENT)
Age: 34
End: 2024-07-29

## 2024-07-29 ENCOUNTER — APPOINTMENT (OUTPATIENT)
Dept: CARDIOLOGY | Facility: CLINIC | Age: 34
End: 2024-07-29
Payer: MEDICAID

## 2024-07-29 VITALS
HEIGHT: 60 IN | HEART RATE: 85 BPM | WEIGHT: 153 LBS | OXYGEN SATURATION: 97 % | SYSTOLIC BLOOD PRESSURE: 104 MMHG | DIASTOLIC BLOOD PRESSURE: 67 MMHG | BODY MASS INDEX: 30.04 KG/M2

## 2024-07-29 DIAGNOSIS — R06.02 SHORTNESS OF BREATH: ICD-10-CM

## 2024-07-29 DIAGNOSIS — R07.9 CHEST PAIN, UNSPECIFIED: ICD-10-CM

## 2024-07-29 DIAGNOSIS — O26.899 OTHER SPECIFIED PREGNANCY RELATED CONDITIONS, UNSPECIFIED TRIMESTER: ICD-10-CM

## 2024-07-29 DIAGNOSIS — R06.02 OTHER SPECIFIED PREGNANCY RELATED CONDITIONS, UNSPECIFIED TRIMESTER: ICD-10-CM

## 2024-07-29 PROCEDURE — 93000 ELECTROCARDIOGRAM COMPLETE: CPT

## 2024-07-29 PROCEDURE — 99204 OFFICE O/P NEW MOD 45 MIN: CPT | Mod: 25

## 2024-07-29 PROCEDURE — G2211 COMPLEX E/M VISIT ADD ON: CPT | Mod: NC

## 2024-07-29 NOTE — HISTORY OF PRESENT ILLNESS
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 She has the following relevant health conditions:   1. Preeclampsia 2. Baby aspirin 3. CP, difficult to breath- on the tops of thighs and shins 4, Arrythmia- [facility] (took a medication) 5. Recent pneumonia and admission at beginning of the month for CP and SOB, CTPE negative for pulmonary embolism, + consolidation in LLL.  BP is 104/67 mmHg.    In the setting of ambulating with no limitations, there is no CP, FARRELL, orthopnea, edema, numbness, tingling, nausea, vomiting, diaphoresis or pre-syncope She reports palpitations.  There is no blurry vision, abdominal pain, and/or headaches. General CVD risk enhancers:   FH premature CAD? 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 No evidence of LVH.   H/H- 11.2/33.6%   .Left ventricular cavity is normal in size. Left ventricular wall thickness is normal. Left ventricular systolic function is normal with an ejection fraction of 69 % by Khoury's method of disks. There are no regional wall motion abnormalities seen.  2. Normal left ventricular diastolic function, with normal left ventricular filling pressure.  3. Normal right ventricular cavity size, with normal wall thickness, and normal right ventricular systolic function.  4. Normal left and right atrial size.  5. No significant valvular disease.  6. No pericardial effusion seen.  7. Mild pulmonic regurgitation.  8. No prior echocardiogram is available for comparison.  9. The inferior vena cava is normal in size measuring 1.59 cm in diameter, (normal <2.1cm) with normal inspiratory collapse (normal >50%) consistent with normal right atrial pressure ( R 3, range 0-5mmHg).     A/P: # CP and SOB- No pulmonary embolism seen in recent admission for these symptoms. Could be from pneumonia, lingering inflammation. It is also possible there is microvascular dysfunction in the setting of Chagas. #Palpitations- I have ordered a Ziopatch to further evaluate.  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

## 2024-07-29 NOTE — HISTORY OF PRESENT ILLNESS
[FreeTextEntry1] : This is a 35 y/o woman who is , 30 weeks EED 2024, with a history of Chagas diseae and preeclampsia in prior pregnancy here for CP and SOB. She has the following relevant health conditions:   1. Preeclampsia 2. Baby aspirin 3. CP, difficult to breath- on the tops of thighs and shins 4, Arrythmia- [facility] (took a medication) 5. Recent pneumonia and admission at beginning of the month for CP and SOB, CTPE negative for pulmonary embolism, + consolidation in LLL.  BP is 104/67 mmHg.    In the setting of ambulating with no limitations, there is no CP, FARRELL, orthopnea, edema, numbness, tingling, nausea, vomiting, diaphoresis or pre-syncope She reports palpitations.  There is no blurry vision, abdominal pain, and/or headaches. General CVD risk enhancers:   FH premature CAD? 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 No evidence of LVH.   H/H- 11.2/33.6%   .Left ventricular cavity is normal in size. Left ventricular wall thickness is normal. Left ventricular systolic function is normal with an ejection fraction of 69 % by Khoury's method of disks. There are no regional wall motion abnormalities seen.  2. Normal left ventricular diastolic function, with normal left ventricular filling pressure.  3. Normal right ventricular cavity size, with normal wall thickness, and normal right ventricular systolic function.  4. Normal left and right atrial size.  5. No significant valvular disease.  6. No pericardial effusion seen.  7. Mild pulmonic regurgitation.  8. No prior echocardiogram is available for comparison.  9. The inferior vena cava is normal in size measuring 1.59 cm in diameter, (normal <2.1cm) with normal inspiratory collapse (normal >50%) consistent with normal right atrial pressure ( R 3, range 0-5mmHg).     A/P: # CP and SOB- No pulmonary embolism seen in recent admission for these symptoms. Could be from pneumonia, lingering inflammation. It is also possible there is microvascular dysfunction in the setting of Chagas. #Palpitations- I have ordered a Ziopatch to further evaluate.  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

## 2024-07-31 ENCOUNTER — NON-APPOINTMENT (OUTPATIENT)
Age: 34
End: 2024-07-31

## 2024-08-01 ENCOUNTER — NON-APPOINTMENT (OUTPATIENT)
Age: 34
End: 2024-08-01

## 2024-08-02 ENCOUNTER — ASOB RESULT (OUTPATIENT)
Age: 34
End: 2024-08-02

## 2024-08-02 ENCOUNTER — APPOINTMENT (OUTPATIENT)
Dept: ANTEPARTUM | Facility: CLINIC | Age: 34
End: 2024-08-02
Payer: MEDICAID

## 2024-08-02 ENCOUNTER — OUTPATIENT (OUTPATIENT)
Dept: OUTPATIENT SERVICES | Facility: HOSPITAL | Age: 34
LOS: 1 days | End: 2024-08-02
Payer: MEDICAID

## 2024-08-02 ENCOUNTER — LABORATORY RESULT (OUTPATIENT)
Age: 34
End: 2024-08-02

## 2024-08-02 ENCOUNTER — APPOINTMENT (OUTPATIENT)
Dept: MATERNAL FETAL MEDICINE | Facility: CLINIC | Age: 34
End: 2024-08-02
Payer: MEDICAID

## 2024-08-02 VITALS
HEIGHT: 60 IN | WEIGHT: 160.38 LBS | DIASTOLIC BLOOD PRESSURE: 64 MMHG | OXYGEN SATURATION: 99 % | BODY MASS INDEX: 31.49 KG/M2 | SYSTOLIC BLOOD PRESSURE: 100 MMHG | HEART RATE: 87 BPM

## 2024-08-02 DIAGNOSIS — O09.299 SUPERVISION OF PREGNANCY WITH OTHER POOR REPRODUCTIVE OR OBSTETRIC HISTORY, UNSPECIFIED TRIMESTER: ICD-10-CM

## 2024-08-02 DIAGNOSIS — Z13.83 ENCOUNTER FOR SCREENING FOR RESPIRATORY DISORDER NEC: ICD-10-CM

## 2024-08-02 DIAGNOSIS — B57.2 CHAGAS' DISEASE (CHRONIC) WITH HEART INVOLVEMENT: ICD-10-CM

## 2024-08-02 DIAGNOSIS — N39.0 URINARY TRACT INFECTION, SITE NOT SPECIFIED: ICD-10-CM

## 2024-08-02 DIAGNOSIS — O09.90 SUPERVISION OF HIGH RISK PREGNANCY, UNSPECIFIED, UNSPECIFIED TRIMESTER: ICD-10-CM

## 2024-08-02 DIAGNOSIS — O23.00 INFECTIONS OF KIDNEY IN PREGNANCY, UNSPECIFIED TRIMESTER: ICD-10-CM

## 2024-08-02 DIAGNOSIS — O09.899 SUPERVISION OF OTHER HIGH RISK PREGNANCIES, UNSPECIFIED TRIMESTER: ICD-10-CM

## 2024-08-02 PROCEDURE — 87086 URINE CULTURE/COLONY COUNT: CPT

## 2024-08-02 PROCEDURE — 81003 URINALYSIS AUTO W/O SCOPE: CPT

## 2024-08-02 PROCEDURE — G0463: CPT

## 2024-08-02 PROCEDURE — 76819 FETAL BIOPHYS PROFIL W/O NST: CPT | Mod: 26,59

## 2024-08-02 PROCEDURE — 76816 OB US FOLLOW-UP PER FETUS: CPT | Mod: 26

## 2024-08-02 PROCEDURE — 99213 OFFICE O/P EST LOW 20 MIN: CPT | Mod: TH,25

## 2024-08-03 LAB
CULTURE RESULTS: SIGNIFICANT CHANGE UP
SPECIMEN SOURCE: SIGNIFICANT CHANGE UP

## 2024-08-05 ENCOUNTER — NON-APPOINTMENT (OUTPATIENT)
Age: 34
End: 2024-08-05

## 2024-08-12 ENCOUNTER — NON-APPOINTMENT (OUTPATIENT)
Age: 34
End: 2024-08-12

## 2024-08-12 ENCOUNTER — APPOINTMENT (OUTPATIENT)
Dept: PULMONOLOGY | Facility: CLINIC | Age: 34
End: 2024-08-12
Payer: MEDICAID

## 2024-08-12 PROCEDURE — 94726 PLETHYSMOGRAPHY LUNG VOLUMES: CPT

## 2024-08-12 PROCEDURE — 94060 EVALUATION OF WHEEZING: CPT

## 2024-08-12 PROCEDURE — 94729 DIFFUSING CAPACITY: CPT

## 2024-08-13 ENCOUNTER — APPOINTMENT (OUTPATIENT)
Dept: PULMONOLOGY | Facility: CLINIC | Age: 34
End: 2024-08-13
Payer: MEDICAID

## 2024-08-13 VITALS
HEIGHT: 60 IN | OXYGEN SATURATION: 98 % | HEART RATE: 103 BPM | WEIGHT: 158.4 LBS | TEMPERATURE: 97.7 F | SYSTOLIC BLOOD PRESSURE: 100 MMHG | RESPIRATION RATE: 16 BRPM | BODY MASS INDEX: 31.1 KG/M2 | DIASTOLIC BLOOD PRESSURE: 68 MMHG

## 2024-08-13 PROCEDURE — 99214 OFFICE O/P EST MOD 30 MIN: CPT

## 2024-08-13 NOTE — HISTORY OF PRESENT ILLNESS
Otitis Media, Pediatric  Otitis media is redness, soreness, and puffiness (swelling) in the part of your child's ear that is right behind the eardrum (middle ear). It may be caused by allergies or infection. It often happens along with a cold.  Otitis media usually goes away on its own. Talk with your child's doctor about which treatment options are right for your child. Treatment will depend on:  · Your child's age.  · Your child's symptoms.  · If the infection is one ear (unilateral) or in both ears (bilateral).    Treatments may include:  · Waiting 48 hours to see if your child gets better.  · Medicines to help with pain.  · Medicines to kill germs (antibiotics), if the otitis media may be caused by bacteria.    If your child gets ear infections often, a minor surgery may help. In this surgery, a doctor puts small tubes into your child's eardrums. This helps to drain fluid and prevent infections.  Follow these instructions at home:  · Make sure your child takes his or her medicines as told. Have your child finish the medicine even if he or she starts to feel better.  · Follow up with your child's doctor as told.  How is this prevented?  · Keep your child's shots (vaccinations) up to date. Make sure your child gets all important shots as told by your child's doctor. These include a pneumonia shot (pneumococcal conjugate PCV7) and a flu (influenza) shot.  · Breastfeed your child for the first 6 months of his or her life, if you can.  · Do not let your child be around tobacco smoke.  Contact a doctor if:  · Your child's hearing seems to be reduced.  · Your child has a fever.  · Your child does not get better after 2-3 days.  Get help right away if:  · Your child is older than 3 months and has a fever and symptoms that persist for more than 72 hours.  · Your child is 3 months old or younger and has a fever and symptoms that suddenly get worse.  · Your child has a headache.  · Your child has neck pain or a stiff  neck.  · Your child seems to have very little energy.  · Your child has a lot of watery poop (diarrhea) or throws up (vomits) a lot.  · Your child starts to shake (seizures).  · Your child has soreness on the bone behind his or her ear.  · The muscles of your child's face seem to not move.  This information is not intended to replace advice given to you by your health care provider. Make sure you discuss any questions you have with your health care provider.  Document Released: 06/05/2009 Document Revised: 05/25/2017 Document Reviewed: 07/15/2014  [employer] Interactive Patient Education © 2017 [employer]  Tonsillitis  Tonsillitis is an infection of the throat. This infection causes the tonsils to become red, tender, and swollen. Tonsils are tissues in the back of your throat. If bacteria caused your infection, antibiotic medicine will be given to you. Sometimes, symptoms of this infection can be helped with the use of steroid medicine. If your tonsillitis is very bad (severe) and happens often, you may need to get your tonsils removed (tonsillectomy).  Follow these instructions at home:  Medicines  · Take over-the-counter and prescription medicines only as told by your doctor.  · If you were prescribed an antibiotic, take it as told by your doctor. Do not stop taking the antibiotic even if you start to feel better.  Eating and drinking  · Drink enough fluid to keep your pee (urine) clear or pale yellow.  · While your throat is sore, eat soft or liquid foods like:  ? Soup.  ? Sherbert.  ? Instant breakfast drinks.  · Drink warm fluids.  · Eat frozen ice pops.  General instructions  · Rest as much as possible and get plenty of sleep.  · Gargle with a salt-water mixture 3-4 times a day or as needed. To make a salt-water mixture, completely dissolve ½-1 tsp of salt in 1 cup of warm water.  · Wash your hands often with soap and water. If there is no soap and water, use hand .  · Do not share cups, bottles, or  [TextBox_4] : Mimi Espinal, a 34-year-old pregnant woman, presents with ongoing respiratory symptoms. The patient has a recent history of hospital admissions for pneumonia and kidney infection, and is currently prescribed an albuterol inhaler. She has been experiencing shortness of breath, a sensation of her throat closing, and occasional wheezing. There are no complaints about cough, but she reported having had some degree of itchiness in the throat. Her partner is a smoker, however, she has always avoided secondhand smoke exposure. - Chief Complaint (CC) : The primary concern expressed by Mimi is the persistent breathing difficulties and a sensation of her throat closing. - History of Present Illness : Mimi Espinal is a 34-year-old pregnant woman () who was first admitted to the hospital in February due to a kidney infection and was later re-admitted in July for pneumonia. She was diagnosed with possible asthma during this latter admission and was prescribed an albuterol inhaler for symptomatic relief. Since discharge, the patient reports using the inhaler on an as-needed basis, with usage varying from three to four times weekly. Since her return home, she visited the emergency department once on  due to abdominal pain. Recently, she has been experiencing shortness of breath and related respiratory symptoms perceived as an itchiness in her throat and a sense of her throat closing, causing occasional wheezing. She denies any triggering factors for her symptoms and reports that her test results have been normal despite her symptoms. The patient denies smoking and avoids exposure to her partner's secondhand smoke. She discloses no recent changes in her medical regimen. Prenatal visits have been regular and no complications have been reported related to her ongoing pregnancy. - Past Medical History : In addition to her ongoing pregnancy, the patient had been hospitalized for a kidney infection in February and later for pneumonia in July. She has never been diagnosed with any chronic health conditions such as diabetes, hypertension or heart disease. other utensils until your symptoms are gone.  · Do not smoke. If you need help quitting, ask your doctor.  · Keep all follow-up visits as told by your doctor. This is important.  Contact a doctor if:  · You have large, tender lumps in your neck.  · You have a fever that does not go away after 2-3 days.  · You have a rash.  · You cough up green, yellow-brown, or bloody fluid.  · You cannot swallow liquids or food for 24 hours.  · Only one of your tonsils is swollen.  Get help right away if:  · You have any new symptoms such as:  ? Vomiting  ? Very bad headache  ? Stiff neck  ? Chest pain  ? Trouble breathing or swallowing  · You have very bad throat pain and also have drooling or voice changes.  · You have very bad pain that is not helped by medicine.  · You cannot fully open your mouth.  · You have redness, swelling, or severe pain anywhere in your neck.  Summary  · Tonsillitis causes your tonsils to be red, tender, and swollen.  · While your throat is sore eat soft or liquid foods.  · Gargle with a salt-water mixture 3-4 times a day or as needed.  · Do not share cups, bottles, or other utensils until your symptoms are gone.  This information is not intended to replace advice given to you by your health care provider. Make sure you discuss any questions you have with your health care provider.  Document Released: 06/05/2009 Document Revised: 05/25/2017 Document Reviewed: 06/06/2014  [employer] Interactive Patient Education © 2017 [employer]    Adrian should be offered plenty caffeine free liquids  Use over the counter pain relievers per bottle instructions  See primary care provider if not improved within 3 days or sooner if worse or new symptoms

## 2024-08-13 NOTE — ASSESSMENT
[FreeTextEntry1] : [Pregnancy and Respiratory Distress]: The patient's principal complaint centered around her breathing difficulties and the sensation of her throat closing, which are being treated symptomatically with an albuterol inhaler. There is a possibility of a mild, undiagnosed case of asthma, though it seems more probable that the patient's symptoms are a result of her progressing pregnancy.PFTs yesterday were normal The increased pressure from the growing baby and hormonal changes could be leading to the reported respiratory distress and acid reflux symptoms. Anticipate symptom resolution after delivery. - Therapeutic Interventions: Continue with the as-needed usage of the albuterol inhaler. Prescription of famotidine for acid reflux symptoms.  - Diagnostic Tests: Follow up on the results of recent breathing test with lung specialist.  - Referrals: Continue regular visits with OB-GYN. Follow-up with infectious disease specialist about past Chagas exposure. If respiratory distress continues post-delivery, referral for a full asthma workup

## 2024-08-14 NOTE — PROVIDER CONTACT NOTE (CHANGE IN STATUS NOTIFICATION) - BACKGROUND
Patient with baseline BP this admission in mid to low 90's [Maximal Pain Intensity: 5/10] : 5/10 [Pain Description/Quality: ___] : Pain description/quality: [unfilled] [Opioid] : opioid [90: Able to carry normal activity; minor signs or symptoms of disease.] : 90: Able to carry normal activity; minor signs or symptoms of disease.  [2 - Distress Level] : Distress Level: 2

## 2024-08-16 ENCOUNTER — APPOINTMENT (OUTPATIENT)
Dept: MATERNAL FETAL MEDICINE | Facility: CLINIC | Age: 34
End: 2024-08-16

## 2024-08-16 VITALS
HEIGHT: 60 IN | HEART RATE: 85 BPM | SYSTOLIC BLOOD PRESSURE: 98 MMHG | OXYGEN SATURATION: 98 % | WEIGHT: 164 LBS | DIASTOLIC BLOOD PRESSURE: 60 MMHG | BODY MASS INDEX: 32.2 KG/M2

## 2024-08-16 LAB
BILIRUB UR QL STRIP: NORMAL
CLARITY UR: CLEAR
COLLECTION METHOD: NORMAL
GLUCOSE UR-MCNC: NORMAL
HCG UR QL: 0.2 EU/DL
HGB UR QL STRIP.AUTO: NORMAL
KETONES UR-MCNC: NORMAL
LEUKOCYTE ESTERASE UR QL STRIP: NORMAL
NITRITE UR QL STRIP: NORMAL
PH UR STRIP: 6
PROT UR STRIP-MCNC: NORMAL
SP GR UR STRIP: 1.02

## 2024-08-16 PROCEDURE — 99213 OFFICE O/P EST LOW 20 MIN: CPT | Mod: TH,25

## 2024-08-22 ENCOUNTER — OUTPATIENT (OUTPATIENT)
Dept: OUTPATIENT SERVICES | Facility: HOSPITAL | Age: 34
LOS: 1 days | End: 2024-08-22
Payer: MEDICAID

## 2024-08-22 ENCOUNTER — APPOINTMENT (OUTPATIENT)
Dept: GASTROENTEROLOGY | Facility: CLINIC | Age: 34
End: 2024-08-22

## 2024-08-22 VITALS
SYSTOLIC BLOOD PRESSURE: 112 MMHG | OXYGEN SATURATION: 98 % | HEART RATE: 84 BPM | DIASTOLIC BLOOD PRESSURE: 66 MMHG | TEMPERATURE: 99 F

## 2024-08-22 DIAGNOSIS — O26.899 OTHER SPECIFIED PREGNANCY RELATED CONDITIONS, UNSPECIFIED TRIMESTER: ICD-10-CM

## 2024-08-22 PROCEDURE — 99212 OFFICE O/P EST SF 10 MIN: CPT | Mod: TH,25,GC

## 2024-08-22 PROCEDURE — 59025 FETAL NON-STRESS TEST: CPT | Mod: 26

## 2024-08-23 VITALS — DIASTOLIC BLOOD PRESSURE: 59 MMHG | HEART RATE: 78 BPM | TEMPERATURE: 99 F | SYSTOLIC BLOOD PRESSURE: 101 MMHG

## 2024-08-23 PROCEDURE — G0463: CPT

## 2024-08-23 PROCEDURE — 59025 FETAL NON-STRESS TEST: CPT

## 2024-08-23 NOTE — OB PROVIDER TRIAGE NOTE - HISTORY OF PRESENT ILLNESS
HPI: 33yo F  @33+6 presents for decreased fetal movement for the past few days. Patient reports that she has generally felt less movement than usual for the past 2 weeks. Since being in triage patient feels increased movement. Corinne also reports crampy lower abdominal pain about every 15 minutes. -LOF.  -VB. Pt denies any other concerns.    #History of PEC (on bASA, home BP monitoring, followed by Cardio OB and patient has a daily headache, following with neuro     #Fetal Hypoplastic nasal bone (per transfer records from [facility])  -ATU (5/3): 4.3mm (6%); Nasal bone appears normal on  comprehensive sono    #Recurrent UTIs  -Patient states that from the beginning of this pregnancy she has been suffering from recurrent UTIs  -Admitted to [facility] at 6 wk (-2/15/24) with ESBL pyelonephritis, patient received Invanz x5 days  -UCx on  positive for E. Faecalis  - UC&S neg on  and  repeat UCx sent today ()  - Originally started on Keflex 500mg qD for suppression, though switched to Macrobid given Keflex with wider systemic effects    #History of Pituitary Adenoma/Headaches (7/10)  -Patient endorsed daily headaches that predated pregnancy  -H/a recurs after taking 1 tab of Tylenol; advised 2 tabs q 6 hr PRN  - on Magnesium Oxide for HA control  - Followed by neurology, will perform MRI postpartum    OB:  () in [facility] 6#, pregnancy c/b PEC  GYN: +hx of ovarian cysts Denies fibroids, abnormal paps, STIs  PMHx: Constipation, HA  Meds: ASA, PNV, Magnesium, Macrobid  All: NKDA  Surgeries: Denies

## 2024-08-23 NOTE — OB RN TRIAGE NOTE - FALL HARM RISK - UNIVERSAL INTERVENTIONS
Bed in lowest position, wheels locked, appropriate side rails in place/Call bell, personal items and telephone in reach/Instruct patient to call for assistance before getting out of bed or chair/Non-slip footwear when patient is out of bed/[place] to call system/Physically safe environment - no spills, clutter or unnecessary equipment/Purposeful Proactive Rounding/Room/bathroom lighting operational, light cord in reach

## 2024-08-23 NOTE — OB RN TRIAGE NOTE - NSMATERNALFETALCONCERNS_OBGYN_ALL_OB_FT
Maternal Alert  This patient participates in the NIH EFFECT study (PI Dr. Lieberman). Please notify the MFM fellow (days) or night float OB resident (nights) upon admission for delivery.

## 2024-08-23 NOTE — OB PROVIDER TRIAGE NOTE - NSHPPHYSICALEXAM_GEN_ALL_CORE
T(C): 37 (08-23-24 @ 00:28), Max: 37.0 (08-22-24 @ 23:42)  HR: 73 (08-23-24 @ 00:52) (73 - 93)  BP: 112/66 (08-23-24 @ 00:28) (112/66 - 112/66)  RR: 18 (08-23-24 @ 00:28) (18 - 18)  SpO2: 99% (08-23-24 @ 00:52) (98% - 100%)    Gen: NAD  CV: RRR  Pulm: breathing comfortably on RA  Abd: gravid, nontender  Extr: moving all extremities with ease  – VE: 0/0/-3  – FHT Cat I: baseline 1, mod variability, +accels, -decels  – [place]: absent  – Sono: vertex, anterior placenta, KARIME 10.2, BPP 8/8

## 2024-08-23 NOTE — OB PROVIDER TRIAGE NOTE - GRAVIDA, OB PROFILE
2 Melolabial Transposition Flap Text: The defect edges were debeveled with a #15 scalpel blade.  Given the location of the defect and the proximity to free margins a melolabial flap was deemed most appropriate.  Using a sterile surgical marker, an appropriate melolabial transposition flap was drawn incorporating the defect.    The area thus outlined was incised deep to adipose tissue with a #15 scalpel blade.  The skin margins were undermined to an appropriate distance in all directions utilizing iris scissors.

## 2024-08-23 NOTE — OB PROVIDER TRIAGE NOTE - NSOBPROVIDERNOTE_OBGYN_ALL_OB_FT
33yo F  @33+6 here for decreased fetal movement for the past few days and lower abdominal cramping. Patient is not in labor and BPP 8/8 and NST reactive. Patient reports good fetal movement here in triage and was able to see movement on ultrasound.    Plan  - f/u at [facility] clinic. Next appointment in 1 week  - Reviewed return precautions: decreased fetal movement despite drinking sugary beverages/ice water, contractions, leaking of fluid, vaginal bleeding  - Patient reports overall less fetal movement over the past 2 weeks. Discussed that this can be normal as she reaches term and there is less room for baby to move. Encouraged patient to drink ice cold water or sugary beverages if she experiences decreased fetal movement.    Patient discussed with attending physician, Dr. Lanier.    NORMA Chin, PGY4

## 2024-08-26 DIAGNOSIS — Z3A.33 33 WEEKS GESTATION OF PREGNANCY: ICD-10-CM

## 2024-08-26 DIAGNOSIS — O36.8130 DECREASED FETAL MOVEMENTS, THIRD TRIMESTER, NOT APPLICABLE OR UNSPECIFIED: ICD-10-CM

## 2024-08-26 DIAGNOSIS — R10.30 LOWER ABDOMINAL PAIN, UNSPECIFIED: ICD-10-CM

## 2024-08-26 DIAGNOSIS — O26.893 OTHER SPECIFIED PREGNANCY RELATED CONDITIONS, THIRD TRIMESTER: ICD-10-CM

## 2024-08-26 DIAGNOSIS — Z87.59 PERSONAL HISTORY OF OTHER COMPLICATIONS OF PREGNANCY, CHILDBIRTH AND THE PUERPERIUM: ICD-10-CM

## 2024-08-26 DIAGNOSIS — O35.8XX0 MATERNAL CARE FOR OTHER (SUSPECTED) FETAL ABNORMALITY AND DAMAGE, NOT APPLICABLE OR UNSPECIFIED: ICD-10-CM

## 2024-08-26 DIAGNOSIS — N83.209 UNSPECIFIED OVARIAN CYST, UNSPECIFIED SIDE: ICD-10-CM

## 2024-08-26 DIAGNOSIS — O34.83 MATERNAL CARE FOR OTHER ABNORMALITIES OF PELVIC ORGANS, THIRD TRIMESTER: ICD-10-CM

## 2024-08-30 ENCOUNTER — APPOINTMENT (OUTPATIENT)
Dept: MATERNAL FETAL MEDICINE | Facility: CLINIC | Age: 34
End: 2024-08-30

## 2024-08-30 ENCOUNTER — OUTPATIENT (OUTPATIENT)
Dept: OUTPATIENT SERVICES | Facility: HOSPITAL | Age: 34
LOS: 1 days | End: 2024-08-30

## 2024-08-30 ENCOUNTER — ASOB RESULT (OUTPATIENT)
Age: 34
End: 2024-08-30

## 2024-08-30 ENCOUNTER — APPOINTMENT (OUTPATIENT)
Dept: ANTEPARTUM | Facility: CLINIC | Age: 34
End: 2024-08-30
Payer: MEDICAID

## 2024-08-30 VITALS
HEART RATE: 84 BPM | HEIGHT: 60 IN | WEIGHT: 171 LBS | OXYGEN SATURATION: 99 % | SYSTOLIC BLOOD PRESSURE: 100 MMHG | BODY MASS INDEX: 33.57 KG/M2 | DIASTOLIC BLOOD PRESSURE: 64 MMHG

## 2024-08-30 DIAGNOSIS — O09.899 SUPERVISION OF OTHER HIGH RISK PREGNANCIES, UNSPECIFIED TRIMESTER: ICD-10-CM

## 2024-08-30 LAB
BILIRUB UR QL STRIP: NORMAL
GLUCOSE UR-MCNC: NORMAL
HCG UR QL: 0.2 EU/DL
HGB UR QL STRIP.AUTO: NORMAL
KETONES UR-MCNC: NORMAL
LEUKOCYTE ESTERASE UR QL STRIP: NORMAL
NITRITE UR QL STRIP: NORMAL
PH UR STRIP: 6
PROT UR STRIP-MCNC: NORMAL
SP GR UR STRIP: 1.01

## 2024-08-30 PROCEDURE — 81003 URINALYSIS AUTO W/O SCOPE: CPT

## 2024-08-30 PROCEDURE — 99213 OFFICE O/P EST LOW 20 MIN: CPT | Mod: TH,GC,25

## 2024-08-30 PROCEDURE — 76816 OB US FOLLOW-UP PER FETUS: CPT | Mod: 26

## 2024-08-30 PROCEDURE — G0463: CPT

## 2024-08-30 PROCEDURE — 76819 FETAL BIOPHYS PROFIL W/O NST: CPT | Mod: 26,59

## 2024-09-04 ENCOUNTER — NON-APPOINTMENT (OUTPATIENT)
Age: 34
End: 2024-09-04

## 2024-09-06 ENCOUNTER — APPOINTMENT (OUTPATIENT)
Dept: MATERNAL FETAL MEDICINE | Facility: CLINIC | Age: 34
End: 2024-09-06
Payer: MEDICAID

## 2024-09-06 ENCOUNTER — LABORATORY RESULT (OUTPATIENT)
Age: 34
End: 2024-09-06

## 2024-09-06 ENCOUNTER — APPOINTMENT (OUTPATIENT)
Dept: ANTEPARTUM | Facility: CLINIC | Age: 34
End: 2024-09-06
Payer: MEDICAID

## 2024-09-06 ENCOUNTER — OUTPATIENT (OUTPATIENT)
Dept: OUTPATIENT SERVICES | Facility: HOSPITAL | Age: 34
LOS: 1 days | End: 2024-09-06
Payer: MEDICAID

## 2024-09-06 ENCOUNTER — ASOB RESULT (OUTPATIENT)
Age: 34
End: 2024-09-06

## 2024-09-06 VITALS
HEART RATE: 79 BPM | HEIGHT: 63 IN | SYSTOLIC BLOOD PRESSURE: 103 MMHG | OXYGEN SATURATION: 99 % | WEIGHT: 167.5 LBS | DIASTOLIC BLOOD PRESSURE: 65 MMHG | BODY MASS INDEX: 29.68 KG/M2

## 2024-09-06 DIAGNOSIS — O09.899 SUPERVISION OF OTHER HIGH RISK PREGNANCIES, UNSPECIFIED TRIMESTER: ICD-10-CM

## 2024-09-06 LAB
BILIRUB UR QL STRIP: NORMAL
GLUCOSE UR-MCNC: NORMAL
HCG UR QL: 0.2 EU/DL
HGB UR QL STRIP.AUTO: NORMAL
KETONES UR-MCNC: NORMAL
LEUKOCYTE ESTERASE UR QL STRIP: NORMAL
NITRITE UR QL STRIP: NORMAL
PH UR STRIP: 6.5
PROT UR STRIP-MCNC: NORMAL
SP GR UR STRIP: 1.01

## 2024-09-06 PROCEDURE — 87653 STREP B DNA AMP PROBE: CPT

## 2024-09-06 PROCEDURE — 87389 HIV-1 AG W/HIV-1&-2 AB AG IA: CPT

## 2024-09-06 PROCEDURE — 76818 FETAL BIOPHYS PROFILE W/NST: CPT | Mod: 26

## 2024-09-06 PROCEDURE — 36415 COLL VENOUS BLD VENIPUNCTURE: CPT

## 2024-09-06 PROCEDURE — 86780 TREPONEMA PALLIDUM: CPT

## 2024-09-06 PROCEDURE — 76818 FETAL BIOPHYS PROFILE W/NST: CPT

## 2024-09-06 PROCEDURE — G0463: CPT

## 2024-09-06 PROCEDURE — 90471 IMMUNIZATION ADMIN: CPT

## 2024-09-06 PROCEDURE — 85027 COMPLETE CBC AUTOMATED: CPT

## 2024-09-06 PROCEDURE — 99213 OFFICE O/P EST LOW 20 MIN: CPT | Mod: TH,25,GC

## 2024-09-06 PROCEDURE — 81003 URINALYSIS AUTO W/O SCOPE: CPT

## 2024-09-07 LAB
HCT VFR BLD CALC: 33.6 % — LOW (ref 34.5–45)
HGB BLD-MCNC: 10.2 G/DL — LOW (ref 11.5–15.5)
HIV 1+2 AB+HIV1 P24 AG SERPL QL IA: SIGNIFICANT CHANGE UP
MCHC RBC-ENTMCNC: 27.1 PG — SIGNIFICANT CHANGE UP (ref 27–34)
MCHC RBC-ENTMCNC: 30.4 GM/DL — LOW (ref 32–36)
MCV RBC AUTO: 89.1 FL — SIGNIFICANT CHANGE UP (ref 80–100)
PLATELET # BLD AUTO: 244 K/UL — SIGNIFICANT CHANGE UP (ref 150–400)
RBC # BLD: 3.77 M/UL — LOW (ref 3.8–5.2)
RBC # FLD: 14.9 % — HIGH (ref 10.3–14.5)
T PALLIDUM AB TITR SER: NEGATIVE — SIGNIFICANT CHANGE UP
WBC # BLD: 8.54 K/UL — SIGNIFICANT CHANGE UP (ref 3.8–10.5)
WBC # FLD AUTO: 8.54 K/UL — SIGNIFICANT CHANGE UP (ref 3.8–10.5)

## 2024-09-08 LAB
GROUP B BETA STREP DNA (PCR): SIGNIFICANT CHANGE UP
SOURCE GROUP B STREP: SIGNIFICANT CHANGE UP

## 2024-09-10 DIAGNOSIS — O09.299 SUPERVISION OF PREGNANCY WITH OTHER POOR REPRODUCTIVE OR OBSTETRIC HISTORY, UNSPECIFIED TRIMESTER: ICD-10-CM

## 2024-09-10 DIAGNOSIS — O23.03 INFECTIONS OF KIDNEY IN PREGNANCY, THIRD TRIMESTER: ICD-10-CM

## 2024-09-10 DIAGNOSIS — O26.899 OTHER SPECIFIED PREGNANCY RELATED CONDITIONS, UNSPECIFIED TRIMESTER: ICD-10-CM

## 2024-09-10 DIAGNOSIS — R10.30 LOWER ABDOMINAL PAIN, UNSPECIFIED: ICD-10-CM

## 2024-09-10 DIAGNOSIS — Z3A.35 35 WEEKS GESTATION OF PREGNANCY: ICD-10-CM

## 2024-09-11 ENCOUNTER — NON-APPOINTMENT (OUTPATIENT)
Age: 34
End: 2024-09-11

## 2024-09-11 RX ORDER — CHLORHEXIDINE GLUCONATE 4 %
325 (65 FE) LIQUID (ML) TOPICAL
Qty: 30 | Refills: 1 | Status: ACTIVE | COMMUNITY
Start: 2024-09-11 | End: 1900-01-01

## 2024-09-11 RX ORDER — ASCORBIC ACID 500 MG
500 TABLET ORAL
Qty: 30 | Refills: 1 | Status: ACTIVE | COMMUNITY
Start: 2024-09-11 | End: 1900-01-01

## 2024-09-12 DIAGNOSIS — Z64.1 PROBLEMS RELATED TO MULTIPARITY: ICD-10-CM

## 2024-09-12 DIAGNOSIS — J18.9 PNEUMONIA, UNSPECIFIED ORGANISM: ICD-10-CM

## 2024-09-12 DIAGNOSIS — O09.90 SUPERVISION OF HIGH RISK PREGNANCY, UNSPECIFIED, UNSPECIFIED TRIMESTER: ICD-10-CM

## 2024-09-12 DIAGNOSIS — Z29.11 ENCOUNTER FOR PROPHYLACTIC IMMUNOTHERAPY FOR RESPIRATORY SYNCYTIAL VIRUS (RSV): ICD-10-CM

## 2024-09-13 ENCOUNTER — OUTPATIENT (OUTPATIENT)
Dept: OUTPATIENT SERVICES | Facility: HOSPITAL | Age: 34
LOS: 1 days | End: 2024-09-13
Payer: MEDICAID

## 2024-09-13 ENCOUNTER — APPOINTMENT (OUTPATIENT)
Dept: ANTEPARTUM | Facility: CLINIC | Age: 34
End: 2024-09-13

## 2024-09-13 ENCOUNTER — ASOB RESULT (OUTPATIENT)
Age: 34
End: 2024-09-13

## 2024-09-13 ENCOUNTER — APPOINTMENT (OUTPATIENT)
Dept: MATERNAL FETAL MEDICINE | Facility: CLINIC | Age: 34
End: 2024-09-13

## 2024-09-13 VITALS
DIASTOLIC BLOOD PRESSURE: 72 MMHG | HEIGHT: 63 IN | HEART RATE: 94 BPM | SYSTOLIC BLOOD PRESSURE: 116 MMHG | BODY MASS INDEX: 30.14 KG/M2 | WEIGHT: 170.13 LBS | OXYGEN SATURATION: 99 %

## 2024-09-13 DIAGNOSIS — O99.619 DISEASES OF THE DIGESTIVE SYSTEM COMPLICATING PREGNANCY, UNSPECIFIED TRIMESTER: ICD-10-CM

## 2024-09-13 DIAGNOSIS — O09.299 SUPERVISION OF PREGNANCY WITH OTHER POOR REPRODUCTIVE OR OBSTETRIC HISTORY, UNSPECIFIED TRIMESTER: ICD-10-CM

## 2024-09-13 DIAGNOSIS — O09.90 SUPERVISION OF HIGH RISK PREGNANCY, UNSPECIFIED, UNSPECIFIED TRIMESTER: ICD-10-CM

## 2024-09-13 DIAGNOSIS — O99.013 ANEMIA COMPLICATING PREGNANCY, THIRD TRIMESTER: ICD-10-CM

## 2024-09-13 DIAGNOSIS — K59.00 DISEASES OF THE DIGESTIVE SYSTEM COMPLICATING PREGNANCY, UNSPECIFIED TRIMESTER: ICD-10-CM

## 2024-09-13 DIAGNOSIS — O09.899 SUPERVISION OF OTHER HIGH RISK PREGNANCIES, UNSPECIFIED TRIMESTER: ICD-10-CM

## 2024-09-13 DIAGNOSIS — O23.00 INFECTIONS OF KIDNEY IN PREGNANCY, UNSPECIFIED TRIMESTER: ICD-10-CM

## 2024-09-13 LAB
BILIRUB UR QL STRIP: NEGATIVE
GLUCOSE UR-MCNC: NEGATIVE
HCG UR QL: NORMAL EU/DL
HGB UR QL STRIP.AUTO: NEGATIVE
KETONES UR-MCNC: NEGATIVE
LEUKOCYTE ESTERASE UR QL STRIP: NORMAL
NITRITE UR QL STRIP: NEGATIVE
PH UR STRIP: 7.5
PROT UR STRIP-MCNC: NEGATIVE
SP GR UR STRIP: 1.02

## 2024-09-13 PROCEDURE — 76818 FETAL BIOPHYS PROFILE W/NST: CPT

## 2024-09-13 PROCEDURE — 76818 FETAL BIOPHYS PROFILE W/NST: CPT | Mod: 26

## 2024-09-13 PROCEDURE — 99213 OFFICE O/P EST LOW 20 MIN: CPT | Mod: TH,GC,25

## 2024-09-16 ENCOUNTER — NON-APPOINTMENT (OUTPATIENT)
Age: 34
End: 2024-09-16

## 2024-09-19 ENCOUNTER — NON-APPOINTMENT (OUTPATIENT)
Age: 34
End: 2024-09-19

## 2024-09-19 DIAGNOSIS — O23.03 INFECTIONS OF KIDNEY IN PREGNANCY, THIRD TRIMESTER: ICD-10-CM

## 2024-09-19 DIAGNOSIS — Z3A.36 36 WEEKS GESTATION OF PREGNANCY: ICD-10-CM

## 2024-09-19 DIAGNOSIS — O09.299 SUPERVISION OF PREGNANCY WITH OTHER POOR REPRODUCTIVE OR OBSTETRIC HISTORY, UNSPECIFIED TRIMESTER: ICD-10-CM

## 2024-09-19 DIAGNOSIS — D35.2 BENIGN NEOPLASM OF PITUITARY GLAND: ICD-10-CM

## 2024-09-20 ENCOUNTER — OUTPATIENT (OUTPATIENT)
Dept: OUTPATIENT SERVICES | Facility: HOSPITAL | Age: 34
LOS: 1 days | End: 2024-09-20
Payer: MEDICAID

## 2024-09-20 ENCOUNTER — APPOINTMENT (OUTPATIENT)
Dept: ANTEPARTUM | Facility: CLINIC | Age: 34
End: 2024-09-20
Payer: MEDICAID

## 2024-09-20 ENCOUNTER — ASOB RESULT (OUTPATIENT)
Age: 34
End: 2024-09-20

## 2024-09-20 ENCOUNTER — APPOINTMENT (OUTPATIENT)
Dept: MATERNAL FETAL MEDICINE | Facility: CLINIC | Age: 34
End: 2024-09-20

## 2024-09-20 VITALS
OXYGEN SATURATION: 99 % | HEIGHT: 63 IN | BODY MASS INDEX: 31.01 KG/M2 | WEIGHT: 175 LBS | SYSTOLIC BLOOD PRESSURE: 105 MMHG | DIASTOLIC BLOOD PRESSURE: 69 MMHG | HEART RATE: 63 BPM

## 2024-09-20 DIAGNOSIS — O09.899 SUPERVISION OF OTHER HIGH RISK PREGNANCIES, UNSPECIFIED TRIMESTER: ICD-10-CM

## 2024-09-20 LAB
BILIRUB UR QL STRIP: NEGATIVE
GLUCOSE UR-MCNC: NEGATIVE
HCG UR QL: NORMAL EU/DL
HGB UR QL STRIP.AUTO: NEGATIVE
KETONES UR-MCNC: NEGATIVE
LEUKOCYTE ESTERASE UR QL STRIP: NORMAL
NITRITE UR QL STRIP: NEGATIVE
PH UR STRIP: 7
PROT UR STRIP-MCNC: NEGATIVE
SP GR UR STRIP: 1.02

## 2024-09-20 PROCEDURE — 99213 OFFICE O/P EST LOW 20 MIN: CPT | Mod: GC

## 2024-09-20 PROCEDURE — 76818 FETAL BIOPHYS PROFILE W/NST: CPT

## 2024-09-20 PROCEDURE — 76818 FETAL BIOPHYS PROFILE W/NST: CPT | Mod: 26

## 2024-09-22 ENCOUNTER — NON-APPOINTMENT (OUTPATIENT)
Age: 34
End: 2024-09-22

## 2024-09-23 ENCOUNTER — NON-APPOINTMENT (OUTPATIENT)
Age: 34
End: 2024-09-23

## 2024-09-23 ENCOUNTER — APPOINTMENT (OUTPATIENT)
Dept: CARDIOLOGY | Facility: CLINIC | Age: 34
End: 2024-09-23
Payer: MEDICAID

## 2024-09-23 VITALS
BODY MASS INDEX: 31.54 KG/M2 | SYSTOLIC BLOOD PRESSURE: 108 MMHG | HEART RATE: 83 BPM | OXYGEN SATURATION: 100 % | DIASTOLIC BLOOD PRESSURE: 73 MMHG | HEIGHT: 63 IN | WEIGHT: 178 LBS

## 2024-09-23 DIAGNOSIS — R00.2 PALPITATIONS: ICD-10-CM

## 2024-09-23 DIAGNOSIS — O09.90 SUPERVISION OF HIGH RISK PREGNANCY, UNSPECIFIED, UNSPECIFIED TRIMESTER: ICD-10-CM

## 2024-09-23 DIAGNOSIS — J18.9 DISEASES OF THE RESPIRATORY SYSTEM COMPLICATING PREGNANCY, UNSPECIFIED TRIMESTER: ICD-10-CM

## 2024-09-23 DIAGNOSIS — O09.299 SUPERVISION OF PREGNANCY WITH OTHER POOR REPRODUCTIVE OR OBSTETRIC HISTORY, UNSPECIFIED TRIMESTER: ICD-10-CM

## 2024-09-23 DIAGNOSIS — Z34.93 ENCOUNTER FOR SUPERVISION OF NORMAL PREGNANCY, UNSPECIFIED, THIRD TRIMESTER: ICD-10-CM

## 2024-09-23 DIAGNOSIS — O99.519 DISEASES OF THE RESPIRATORY SYSTEM COMPLICATING PREGNANCY, UNSPECIFIED TRIMESTER: ICD-10-CM

## 2024-09-23 DIAGNOSIS — O23.00 INFECTIONS OF KIDNEY IN PREGNANCY, UNSPECIFIED TRIMESTER: ICD-10-CM

## 2024-09-23 PROCEDURE — 99214 OFFICE O/P EST MOD 30 MIN: CPT | Mod: 25

## 2024-09-23 PROCEDURE — 93000 ELECTROCARDIOGRAM COMPLETE: CPT

## 2024-09-23 RX ORDER — FAMOTIDINE 20 MG/1
20 TABLET, FILM COATED ORAL
Refills: 0 | Status: ACTIVE | COMMUNITY
Start: 2024-09-23

## 2024-09-23 RX ORDER — ALBUTEROL SULFATE 90 UG/1
108 (90 BASE) INHALANT RESPIRATORY (INHALATION)
Qty: 1 | Refills: 1 | Status: ACTIVE | COMMUNITY
Start: 2024-09-23

## 2024-09-23 NOTE — HISTORY OF PRESENT ILLNESS
[FreeTextEntry1] : : #[id number] This is a 35 y/o Telugu speaking woman who is ,  seen today for follow up , last appt 2023:  now:  37  weeks and 2 days  EED 2024, with a history of Chagas disease and preeclampsia in prior pregnancy here for follow up .  Last appointment patient c/o of CP, palpitations  and SOB.  Since last appt: evaluated by Dr. Mendoza ( Pul) and uses inhaler prn.  Holter/ ZIO monitor with no significant arrythmias ( report :  < 1.0% supraventricular ectopy, < 1%  PVC, minimum HR 60, Max 156 BPM, av HR 87, underlying rhythm NSR) She has the following relevant health conditions: (OB: Dr. Vania Vigil) 1. Preeclampsia 2. Baby aspirin 3. CP, difficult to breath- on the tops of thighs and shins 4, Arrythmia- [facility] (took a medication) 5. Recent pneumonia and admission at beginning of the month for CP and SOB, CTPE negative for pulmonary embolism, + consolidation in LLL.  6. Chagas disease   Symptomatically patient notes occasional chest discomfort and palpitations mostly in evening.  Has started Pepcid recently.  Pain and palpitations subsides spontaneously in a few minutes. Also has noted one episode of blurry vision and headache 2 weeks ago with occasional recurrence currently.  Has not sought out care of OB. Denies edema, abdominal pain, SOB, orthopnea/ N/V or presyncope.   She continues to walk daily.   BP today: 108/73 12 lead ECG- NSR with normal axis and intervals, normal QRSd, no ST-T changes. No evidence of LVH.   H/H- 11.2/33.6%: takes Iron every other day due to hx of constipation.   2024:  .Left ventricular cavity is normal in size. Left ventricular wall thickness is normal. Left ventricular systolic function is normal with an ejection fraction of 69 % by Khoury's method of disks. There are no regional wall motion abnormalities seen.  2. Normal left ventricular diastolic function, with normal left ventricular filling pressure.  3. Normal right ventricular cavity size, with normal wall thickness, and normal right ventricular systolic function.  4. Normal left and right atrial size.  5. No significant valvular disease.  6. No pericardial effusion seen.  7. Mild pulmonic regurgitation.  8. No prior echocardiogram is available for comparison.  9. The inferior vena cava is normal in size measuring 1.59 cm in diameter, (normal <2.1cm) with normal inspiratory collapse (normal >50%) consistent with normal right atrial pressure ( R 3, range 0-5mmHg).     A/P: # CP : Improved in setting of Pepcid. Normal TTE and EKG- No pulmonary embolism seen in recent admission for these symptoms. Could be from pneumonia, lingering inflammation. It is also possible there is microvascular dysfunction in the setting of Chagas.  -Recommend small frequent meals, sitting up after meals, no heavy meals after 7 pm -If CP persists then would refer to ED for further evaluation   #Palpitations-  ( improved ) [place] : no significant arrythmias -Continue to hydrate at least 1.5-2 liters daily -Avoid caffeine products -continues Iron for anemia  #Chagas disease - not sure if this is an active diagnosis- I told her I would like to see her after the pregnancy to evaluate for cardiac involvement and need for treatment,    #Adverse Cardiovascular Risk Factors Personal history of prior preclampsia -Hydrate well -Continue to walk 15-20 minutes daily  -Timed-deep breathing for evidence-based BP lowering - I have counseled the patient on timed deep breathing as follows:  * Box breathing: sitting upright slowly exhale through your mouth, getting all oxygen out of lungs. Focus on this intention, breath in over 4 counts, hold for four counts and breathe out for four counts. Repeat.  * 4-7-8: This is a bit more difficult but is effective for BP lowering in the moment. breathe in for 4 counts, hold for 7 and exhale over 8 counts.

## 2024-09-23 NOTE — REVIEW OF SYSTEMS
[Negative] : Heme/Lymph [Blurry Vision] : blurred vision [Palpitations] : palpitations [Heartburn] : heartburn [Constipation] : constipation [FreeTextEntry7] : 38 weeks GA, heartburn [de-identified] : occasional headache

## 2024-09-25 DIAGNOSIS — Z3A.37 37 WEEKS GESTATION OF PREGNANCY: ICD-10-CM

## 2024-09-25 DIAGNOSIS — O09.299 SUPERVISION OF PREGNANCY WITH OTHER POOR REPRODUCTIVE OR OBSTETRIC HISTORY, UNSPECIFIED TRIMESTER: ICD-10-CM

## 2024-09-25 DIAGNOSIS — O09.33 SUPERVISION OF PREGNANCY WITH INSUFFICIENT ANTENATAL CARE, THIRD TRIMESTER: ICD-10-CM

## 2024-09-25 DIAGNOSIS — O23.03 INFECTIONS OF KIDNEY IN PREGNANCY, THIRD TRIMESTER: ICD-10-CM

## 2024-09-25 DIAGNOSIS — D35.2 BENIGN NEOPLASM OF PITUITARY GLAND: ICD-10-CM

## 2024-09-27 ENCOUNTER — INPATIENT (INPATIENT)
Facility: HOSPITAL | Age: 34
LOS: 2 days | Discharge: HOME | End: 2024-09-30
Attending: OBSTETRICS & GYNECOLOGY | Admitting: OBSTETRICS & GYNECOLOGY
Payer: MEDICAID

## 2024-09-27 ENCOUNTER — APPOINTMENT (OUTPATIENT)
Dept: ANTEPARTUM | Facility: CLINIC | Age: 34
End: 2024-09-27
Payer: MEDICAID

## 2024-09-27 ENCOUNTER — ASOB RESULT (OUTPATIENT)
Age: 34
End: 2024-09-27

## 2024-09-27 ENCOUNTER — OUTPATIENT (OUTPATIENT)
Dept: OUTPATIENT SERVICES | Facility: HOSPITAL | Age: 34
LOS: 1 days | End: 2024-09-27
Payer: MEDICAID

## 2024-09-27 ENCOUNTER — APPOINTMENT (OUTPATIENT)
Dept: MATERNAL FETAL MEDICINE | Facility: CLINIC | Age: 34
End: 2024-09-27

## 2024-09-27 VITALS
HEIGHT: 60 IN | DIASTOLIC BLOOD PRESSURE: 64 MMHG | SYSTOLIC BLOOD PRESSURE: 109 MMHG | TEMPERATURE: 99 F | RESPIRATION RATE: 16 BRPM | WEIGHT: 177.91 LBS | HEART RATE: 87 BPM | OXYGEN SATURATION: 97 %

## 2024-09-27 DIAGNOSIS — Z34.80 ENCOUNTER FOR SUPERVISION OF OTHER NORMAL PREGNANCY, UNSPECIFIED TRIMESTER: ICD-10-CM

## 2024-09-27 DIAGNOSIS — Z3A.38 38 WEEKS GESTATION OF PREGNANCY: ICD-10-CM

## 2024-09-27 DIAGNOSIS — O26.899 OTHER SPECIFIED PREGNANCY RELATED CONDITIONS, UNSPECIFIED TRIMESTER: ICD-10-CM

## 2024-09-27 LAB
BASOPHILS # BLD AUTO: 0.01 K/UL — SIGNIFICANT CHANGE UP (ref 0–0.2)
BASOPHILS NFR BLD AUTO: 0.1 % — SIGNIFICANT CHANGE UP (ref 0–2)
BLD GP AB SCN SERPL QL: NEGATIVE — SIGNIFICANT CHANGE UP
EOSINOPHIL # BLD AUTO: 0.16 K/UL — SIGNIFICANT CHANGE UP (ref 0–0.5)
EOSINOPHIL NFR BLD AUTO: 2.1 % — SIGNIFICANT CHANGE UP (ref 0–6)
HCT VFR BLD CALC: 32 % — LOW (ref 34.5–45)
HGB BLD-MCNC: 10.1 G/DL — LOW (ref 11.5–15.5)
IMM GRANULOCYTES NFR BLD AUTO: 0.8 % — SIGNIFICANT CHANGE UP (ref 0–0.9)
LYMPHOCYTES # BLD AUTO: 1.72 K/UL — SIGNIFICANT CHANGE UP (ref 1–3.3)
LYMPHOCYTES # BLD AUTO: 22.5 % — SIGNIFICANT CHANGE UP (ref 13–44)
MCHC RBC-ENTMCNC: 26.8 PG — LOW (ref 27–34)
MCHC RBC-ENTMCNC: 31.6 GM/DL — LOW (ref 32–36)
MCV RBC AUTO: 84.9 FL — SIGNIFICANT CHANGE UP (ref 80–100)
MONOCYTES # BLD AUTO: 0.69 K/UL — SIGNIFICANT CHANGE UP (ref 0–0.9)
MONOCYTES NFR BLD AUTO: 9 % — SIGNIFICANT CHANGE UP (ref 2–14)
NEUTROPHILS # BLD AUTO: 5 K/UL — SIGNIFICANT CHANGE UP (ref 1.8–7.4)
NEUTROPHILS NFR BLD AUTO: 65.5 % — SIGNIFICANT CHANGE UP (ref 43–77)
NRBC # BLD: 0 /100 WBCS — SIGNIFICANT CHANGE UP (ref 0–0)
PLATELET # BLD AUTO: 216 K/UL — SIGNIFICANT CHANGE UP (ref 150–400)
RBC # BLD: 3.77 M/UL — LOW (ref 3.8–5.2)
RBC # FLD: 15.6 % — HIGH (ref 10.3–14.5)
RH IG SCN BLD-IMP: POSITIVE — SIGNIFICANT CHANGE UP
T PALLIDUM AB TITR SER: NEGATIVE — SIGNIFICANT CHANGE UP
WBC # BLD: 7.64 K/UL — SIGNIFICANT CHANGE UP (ref 3.8–10.5)
WBC # FLD AUTO: 7.64 K/UL — SIGNIFICANT CHANGE UP (ref 3.8–10.5)

## 2024-09-27 PROCEDURE — 76820 UMBILICAL ARTERY ECHO: CPT | Mod: 26,59

## 2024-09-27 PROCEDURE — 76818 FETAL BIOPHYS PROFILE W/NST: CPT | Mod: 26,59

## 2024-09-27 PROCEDURE — 76816 OB US FOLLOW-UP PER FETUS: CPT | Mod: 26

## 2024-09-27 PROCEDURE — 76816 OB US FOLLOW-UP PER FETUS: CPT

## 2024-09-27 PROCEDURE — 76820 UMBILICAL ARTERY ECHO: CPT

## 2024-09-27 PROCEDURE — 76818 FETAL BIOPHYS PROFILE W/NST: CPT

## 2024-09-27 RX ORDER — OXYTOCIN/RINGER'S LACTATE 20/500ML
167 PLASTIC BAG, INJECTION (ML) INTRAVENOUS
Qty: 30 | Refills: 0 | Status: DISCONTINUED | OUTPATIENT
Start: 2024-09-27 | End: 2024-09-30

## 2024-09-27 RX ORDER — FAMOTIDINE 40 MG
20 TABLET ORAL DAILY
Refills: 0 | Status: DISCONTINUED | OUTPATIENT
Start: 2024-09-27 | End: 2024-09-30

## 2024-09-27 RX ORDER — SODIUM CHLORIDE IRRIG SOLUTION 0.9 %
1000 SOLUTION, IRRIGATION IRRIGATION
Refills: 0 | Status: DISCONTINUED | OUTPATIENT
Start: 2024-09-27 | End: 2024-09-28

## 2024-09-27 RX ORDER — ALBUTEROL 90 MCG
2 AEROSOL (GRAM) INHALATION EVERY 6 HOURS
Refills: 0 | Status: DISCONTINUED | OUTPATIENT
Start: 2024-09-27 | End: 2024-09-30

## 2024-09-27 RX ORDER — OXYTOCIN/RINGER'S LACTATE 20/500ML
4 PLASTIC BAG, INJECTION (ML) INTRAVENOUS
Qty: 30 | Refills: 0 | Status: DISCONTINUED | OUTPATIENT
Start: 2024-09-27 | End: 2024-09-30

## 2024-09-27 RX ORDER — SODIUM CITRATE AND CITRIC ACID MONOHYDRATE 334; 500 MG/5ML; MG/5ML
15 SOLUTION ORAL EVERY 6 HOURS
Refills: 0 | Status: DISCONTINUED | OUTPATIENT
Start: 2024-09-27 | End: 2024-09-28

## 2024-09-27 RX ORDER — CHLORHEXIDINE GLUCONATE ORAL RINSE 1.2 MG/ML
1 SOLUTION DENTAL DAILY
Refills: 0 | Status: DISCONTINUED | OUTPATIENT
Start: 2024-09-27 | End: 2024-09-28

## 2024-09-27 RX ADMIN — Medication 4 MILLIUNIT(S)/MIN: at 13:23

## 2024-09-27 NOTE — OB PROVIDER H&P - CURRENT PREGNANCY COMPLICATIONS, OB PROFILE
Oligohydramnios/Intrauterine Growth Restriction Oligohydramnios/Abnormal Amniotic Fluid Volume/Abnormal Fetal Surveillance

## 2024-09-27 NOTE — OB RN PATIENT PROFILE - FUNCTIONAL ASSESSMENT - BASIC MOBILITY 6.
Same Histology In Subsequent Stages Text: The pattern and morphology of the tumor is as described in the first stage. 4 = No assist / stand by assistance

## 2024-09-27 NOTE — CHART NOTE - NSCHARTNOTEFT_GEN_A_CORE
/Attending: (Late Entry 2nd to Pt care)    I assumed care of this 33y/o  @38.1wks sent from clinic for IOl 2nd to anhydramnios.  Pt was signed out to me by the day team; chart reviewed; pt seen and examined at bedside.    Pt w/ PNC in [facility] 2nd to hx of Pneumonia/Asthma which required admission on - an was then readmitted -; during this time she was evaluated by ID and was placed on Cefuroxime and Macrobid suppression, as well as Albuterol. Pt was also note dot be +Chagas and was evaluated by ID.  PNC was also complicated by recurrent UTI and is on Macrobid suppression.  Previous  in  in [facility] and was complicated by PEC; pelvis tested to 6lbs.  Hx of Pituitary adenoma ad was om Cabergoline and had annual MRI's between 2078-6533; last MRI was 3/19/24 and showed no focal brain lesion and was consistent with  Migraines.  Per Neuro, to have a MRI PP to evaluate Pituitary.  IOL was started with Cervical Balloon and Pitocin at 1:30pm and is currently at 12mu.    Prenatal labs reviewed:   Blood Type:  A+; GBS: neg; HepB sAg:  neg; HIV: neg; RPR: neg    VSS, afebrile  Pt was wincing with each contraction and admitted that her contractions were more intense and more frequent.  EFW: 3064gms as of today  FHT: BL: 130bpm; Cat-1  [place]: ctx's q 3-4mins  SVE: On my exam, cervical balloon expelled and Cx: 4-5/50/-3  Memb: Intact    Labs:  H/H: 10.32; Plt: 216    A/P  -33y/o  @38.1wks sent from clinic for IOl 2nd to anhydramnios.  -Consent for management on L&D and for delivery obtained in Greek prior and in chart.  -Cervical Balloon expelled and will continue with Pitocin and continue to increase as per guidelines; will augment with AROM on next exam if does nit have a SROM.  -Anesthesia consulted for pain management with Epidural as pt is uncomfortable.  -Pt is a moderate PPH risk and will get a 2nd uterotonic after delivery of the placenta for PPH prophylaxis.  -Anticipate vaginal delivery at this time    Dr. Hsieh

## 2024-09-27 NOTE — OB PROVIDER H&P - NSLOWPPHRISK_OBGYN_A_OB
No previous uterine incision/Jay Pregnancy/Less than or equal to 4 previous vaginal births/No known bleeding disorder/No history of postpartum hemorrhage
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
## 2024-09-27 NOTE — OB PROVIDER IHI INDUCTION/AUGMENTATION NOTE - NS_GESTAGE_OBGYN_ALL_OB_FT
Dr. Marques,    Could a new order to diabetes services please be placed for Sandra? With the new year all subsequent and medicare patients need new orders.    Thank you,  Viviana Moore  Educational Services  ext [phone]    
Per verbal order by Dr. Marques, order signed.  
[id number]

## 2024-09-27 NOTE — OB PROVIDER H&P - PROBLEM SELECTOR PLAN 1
Refer to HPI - Admit to L & D/labs/IVF/clears  - Fetal status - NST/TOCO  - GBS negative  - Pain control - as needed  - Labor management- will start IOl with  - Consents to be signed  - Social work consulted  D/W  Dr Hargrove     # [id number]    Maliha Tracy PA-C - Admit to L & D/labs/IVF/clears  - Fetal status - NST/TOCO  - GBS negative  - Pain control - as needed  - Labor management- will start IOl with cervical balloon and pitocin  - Consents to be signed  - Social work consulted  D/W  Dr Hargrove     # [phone]    Maliha Tracy PA-C - Admit to L & D/labs/IVF/clears  - Fetal status - NST/TOCO  - GBS negative  - Pain control - as needed  - Labor management- will start IOL with cervical balloon and pitocin  - Consents to be signed  - Social work consulted  D/W  Dr Hargrove     # [id number]    Maliha Tracy PA-C

## 2024-09-27 NOTE — OB RN PATIENT PROFILE - LIMIT VISITORS, INFANT PROFILE
"Chief Complaint  Med Refill (Pt is here for a med recheck. State that he was dx with lung cancer and sees Dr Marquez. )    Subjective          Eliazar Morley presents to [facility]  In today to get refills on his blood pressure medication.  He is currently seeing Dr. Maldonado  for his lung cancer treatment.  States getting labs through them so defers any labs today.  Denies any  chest pain or shortness of breath. Is having quite  a bit of pain from lung biopsy area and has been discussing with him for pain management.       Objective   Vital Signs:   /98   Pulse 71   Temp 98.1 °F (36.7 °C) (Oral)   Ht 177.8 cm (70\")   Wt 106 kg (233 lb 9.6 oz)   SpO2 96%   BMI 33.52 kg/m²     Body mass index is 33.52 kg/m².    Review of Systems   Constitutional: Negative for fever.   HENT: Negative for congestion and sore throat.    Respiratory: Negative for shortness of breath.    Cardiovascular: Negative for chest pain and leg swelling.       Past History:  Medical History: has a past medical history of Adenocarcinoma of lung (HCC) (07/2020), Anticoagulated, Appendicitis, Coronary arteriosclerosis in native artery, Coronary artery disease, Elevated blood pressure reading, Esophageal reflux, Fracture, Fractures, GERD (gastroesophageal reflux disease), Hearing disorder, Heart murmur, Hypercholesterolemia, Hyperlipidemia, Hypertension, Lung mass, Mixed hyperlipidemia, Prediabetes, and Tendon sheath disorder.   Surgical History: has a past surgical history that includes Cardiac catheterization; Tibia fracture surgery (Bilateral); Appendectomy; Cardiac catheterization (N/A, 08/17/2018); Coronary stent placement; Amputation finger / thumb (Right); Trigger finger release (Bilateral); Colonoscopy; bronchoscopy thoracotomy (Left, 09/23/2020); and Carotid stent (2009).   Family History: family history includes Alzheimer's disease in his father; Asthma in his sister; Cancer in his mother and " another family member; Cirrhosis in his sister; Depression in his brother and sister; Emphysema in his mother; Hearing loss in his father; Heart attack in his paternal grandmother; Hypertension in his sister; Osteoarthritis in his brother, father, and mother; Other (age of onset: 26) in his brother; Rheum arthritis in his brother; Stroke in his father and mother.   Social History: reports that he quit smoking about 22 months ago. His smoking use included cigarettes. He has a 45.00 pack-year smoking history. He has never used smokeless tobacco. He reports current alcohol use of about 6.0 standard drinks of alcohol per week. He reports that he does not use drugs.      Current Outpatient Medications:   •  aspirin 81 MG EC tablet, Take 81 mg by mouth Daily., Disp: , Rfl:   •  carvedilol (COREG) 12.5 MG tablet, Take 1 tablet by mouth 2 (Two) Times a Day With Meals., Disp: 180 tablet, Rfl: 1  •  Cholecalciferol (VITAMIN D3) 50 MCG (2000 UT) tablet, Take  by mouth Daily., Disp: , Rfl:   •  folic acid (FOLVITE) 1 MG tablet, Take 1 tablet by mouth Daily. Start 7 days prior to chemotherapy until at least 3 weeks after all chemotherapy., Disp: 30 tablet, Rfl: 5  •  irbesartan (AVAPRO) 150 MG tablet, Take 1 tablet by mouth Daily., Disp: 90 tablet, Rfl: 1  •  metFORMIN ER (GLUCOPHAGE-XR) 500 MG 24 hr tablet, TK 1 T PO QD AFTER SUPPER, Disp: , Rfl:   •  ondansetron (ZOFRAN) 8 MG tablet, Take 1 tablet by mouth 3 (Three) Times a Day As Needed for Nausea or Vomiting., Disp: 30 tablet, Rfl: 5  •  oxyCODONE-acetaminophen (PERCOCET)  MG per tablet, Take 1 tablet by mouth Every 6 (Six) Hours As Needed. for pain, Disp: , Rfl:   •  pantoprazole (PROTONIX) 40 MG EC tablet, Take 40 mg by mouth Daily., Disp: , Rfl:   •  Repatha SureClick solution auto-injector SureClick injection, ADMINISTER 1 INJECTION UNDER THE SKIN EVERY 2 WEEKS, Disp: , Rfl:   •  vitamin B-12 (CYANOCOBALAMIN) 500 MCG tablet, Take 500 mcg by mouth Daily., Disp: ,  Rfl:   •  Xtampza ER 13.5 MG capsule extended-release 12 hour , TAKE 1 CAPSULE BY MOUTH TWICE DAILY WITH A MEAL/FOOD. DO NOT CRUSH, Disp: , Rfl:   Allergies: Patient has no known allergies.    Physical Exam  Constitutional:       Appearance: Normal appearance.   HENT:      Right Ear: Tympanic membrane normal.      Left Ear: Tympanic membrane normal.      Mouth/Throat:      Pharynx: Oropharynx is clear.   Eyes:      Conjunctiva/sclera: Conjunctivae normal.      Pupils: Pupils are equal, round, and reactive to light.   Cardiovascular:      Rate and Rhythm: Normal rate and regular rhythm.      Heart sounds: Normal heart sounds.   Pulmonary:      Effort: Pulmonary effort is normal.      Breath sounds: Normal breath sounds.   Abdominal:      Palpations: Abdomen is soft.      Tenderness: There is no abdominal tenderness.   Neurological:      Mental Status: He is oriented to person, place, and time.   Psychiatric:         Mood and Affect: Mood normal.         Behavior: Behavior normal.             Assessment and Plan   Diagnoses and all orders for this visit:    1. Hypertension, essential (Primary)  Patient's blood pressure does show mild elevation today.  Encouraged him to monitor blood pressure at home.  He believes it may be up due to pain-is going to further discuss this with Dr. Maldonado.  Will defer labs today.  Return to clinic prior to recheck as needed, or if blood pressure continues to remain elevated..  Other orders  -     carvedilol (COREG) 12.5 MG tablet; Take 1 tablet by mouth 2 (Two) Times a Day With Meals.  Dispense: 180 tablet; Refill: 1  -     irbesartan (AVAPRO) 150 MG tablet; Take 1 tablet by mouth Daily.  Dispense: 90 tablet; Refill: 1            Follow Up   Return in about 6 months (around 11/4/2022).  Patient was given instructions and counseling regarding his condition or for health maintenance advice. Please see specific information pulled into the AVS if appropriate.     Jessica Dudley PA-C   no

## 2024-09-27 NOTE — OB RN PATIENT PROFILE - VISION (WITH CORRECTIVE LENSES IF THE PATIENT USUALLY WEARS THEM):
Normal vision: sees adequately in most situations; can see medication labels, newsprint [Negative] : Heme/Lymph [FreeTextEntry5] : See HPI

## 2024-09-27 NOTE — OB RN PATIENT PROFILE - NSICDXPASTMEDICALHX_GEN_ALL_CORE_FT
PAST MEDICAL HISTORY:  Bacterial UTI     Helicobacter pylori gastritis     History of pneumonia, recurrent     Migraines     PCOS (polycystic ovarian syndrome)

## 2024-09-27 NOTE — OB PROVIDER H&P - ATTENDING COMMENTS
Obstetrical  8am-6pm:  Marie #[id number]  Patient seen and examined by me.  Agree with above resident note. Offered to answer any questions. Discussed reason for induction due to oligo and IUGR. Patient and partner stated they understood and accept the treatment as outlined.  Kwasi QUINTAAN

## 2024-09-28 ENCOUNTER — NON-APPOINTMENT (OUTPATIENT)
Age: 34
End: 2024-09-28

## 2024-09-28 PROCEDURE — 88307 TISSUE EXAM BY PATHOLOGIST: CPT | Mod: 26

## 2024-09-28 PROCEDURE — 59409 OBSTETRICAL CARE: CPT | Mod: U9

## 2024-09-28 RX ORDER — SODIUM CHLORIDE 0.9 % (FLUSH) 0.9 %
1000 SYRINGE (ML) INJECTION
Refills: 0 | Status: DISCONTINUED | OUTPATIENT
Start: 2024-09-28 | End: 2024-09-30

## 2024-09-28 RX ORDER — SODIUM CHLORIDE IRRIG SOLUTION 0.9 %
1000 SOLUTION, IRRIGATION IRRIGATION ONCE
Refills: 0 | Status: DISCONTINUED | OUTPATIENT
Start: 2024-09-28 | End: 2024-09-30

## 2024-09-28 RX ORDER — ACETAMINOPHEN 325 MG
1000 TABLET ORAL ONCE
Refills: 0 | Status: COMPLETED | OUTPATIENT
Start: 2024-09-28 | End: 2024-09-28

## 2024-09-28 RX ORDER — PIPERACILLIN SODIUM AND TAZOBACTAM SODIUM 12; 1.5 G/60ML; G/60ML
4.5 INJECTION, POWDER, LYOPHILIZED, FOR SOLUTION INTRAVENOUS EVERY 8 HOURS
Refills: 0 | Status: DISCONTINUED | OUTPATIENT
Start: 2024-09-28 | End: 2024-09-29

## 2024-09-28 RX ORDER — ACETAMINOPHEN 325 MG
650 TABLET ORAL ONCE
Refills: 0 | Status: COMPLETED | OUTPATIENT
Start: 2024-09-28 | End: 2024-09-28

## 2024-09-28 RX ORDER — KETOROLAC TROMETHAMINE 10 MG/1
30 TABLET, FILM COATED ORAL ONCE
Refills: 0 | Status: DISCONTINUED | OUTPATIENT
Start: 2024-09-28 | End: 2024-09-28

## 2024-09-28 RX ORDER — SOAP/LANOLIN
1 BAR TOPICAL EVERY 4 HOURS
Refills: 0 | Status: DISCONTINUED | OUTPATIENT
Start: 2024-09-28 | End: 2024-09-30

## 2024-09-28 RX ORDER — SODIUM CHLORIDE 0.9 % (FLUSH) 0.9 %
300 SYRINGE (ML) INJECTION ONCE
Refills: 0 | Status: COMPLETED | OUTPATIENT
Start: 2024-09-28 | End: 2024-09-28

## 2024-09-28 RX ORDER — TETANUS TOXOID, REDUCED DIPHTHERIA TOXOID AND ACELLULAR PERTUSSIS VACCINE, ADSORBED 5; 2.5; 8; 8; 2.5 [IU]/.5ML; [IU]/.5ML; UG/.5ML; UG/.5ML; UG/.5ML
0.5 SUSPENSION INTRAMUSCULAR ONCE
Refills: 0 | Status: DISCONTINUED | OUTPATIENT
Start: 2024-09-28 | End: 2024-09-30

## 2024-09-28 RX ORDER — PRENATAL VIT,CAL 76/IRON/FOLIC 29 MG-1 MG
1 TABLET ORAL DAILY
Refills: 0 | Status: DISCONTINUED | OUTPATIENT
Start: 2024-09-28 | End: 2024-09-30

## 2024-09-28 RX ORDER — PRAMOXINE HYDROCHLORIDE 10 MG/ML
1 LOTION TOPICAL EVERY 4 HOURS
Refills: 0 | Status: DISCONTINUED | OUTPATIENT
Start: 2024-09-28 | End: 2024-09-30

## 2024-09-28 RX ORDER — DIBUCAINE 1 %
1 OINTMENT (GRAM) TOPICAL EVERY 6 HOURS
Refills: 0 | Status: DISCONTINUED | OUTPATIENT
Start: 2024-09-28 | End: 2024-09-30

## 2024-09-28 RX ORDER — MAGNESIUM HYDROXIDE 400 MG/5ML
30 SUSPENSION, ORAL (FINAL DOSE FORM) ORAL
Refills: 0 | Status: DISCONTINUED | OUTPATIENT
Start: 2024-09-28 | End: 2024-09-30

## 2024-09-28 RX ORDER — OXYTOCIN/RINGER'S LACTATE 20/500ML
167 PLASTIC BAG, INJECTION (ML) INTRAVENOUS
Qty: 30 | Refills: 0 | Status: DISCONTINUED | OUTPATIENT
Start: 2024-09-28 | End: 2024-09-28

## 2024-09-28 RX ORDER — OXYCODONE HYDROCHLORIDE 30 MG/1
5 TABLET, FILM COATED, EXTENDED RELEASE ORAL ONCE
Refills: 0 | Status: DISCONTINUED | OUTPATIENT
Start: 2024-09-28 | End: 2024-09-30

## 2024-09-28 RX ORDER — OXYCODONE HYDROCHLORIDE 30 MG/1
5 TABLET, FILM COATED, EXTENDED RELEASE ORAL
Refills: 0 | Status: DISCONTINUED | OUTPATIENT
Start: 2024-09-28 | End: 2024-09-30

## 2024-09-28 RX ORDER — ACETAMINOPHEN 325 MG
1000 TABLET ORAL ONCE
Refills: 0 | Status: DISCONTINUED | OUTPATIENT
Start: 2024-09-28 | End: 2024-09-28

## 2024-09-28 RX ORDER — DIPHENHYDRAMINE HCL 12.5MG/5ML
25 LIQUID (ML) ORAL EVERY 6 HOURS
Refills: 0 | Status: DISCONTINUED | OUTPATIENT
Start: 2024-09-28 | End: 2024-09-30

## 2024-09-28 RX ORDER — SODIUM CHLORIDE 0.9 % (FLUSH) 0.9 %
3 SYRINGE (ML) INJECTION EVERY 8 HOURS
Refills: 0 | Status: DISCONTINUED | OUTPATIENT
Start: 2024-09-28 | End: 2024-09-30

## 2024-09-28 RX ORDER — ACETAMINOPHEN 325 MG
975 TABLET ORAL
Refills: 0 | Status: DISCONTINUED | OUTPATIENT
Start: 2024-09-28 | End: 2024-09-30

## 2024-09-28 RX ORDER — ANTI-ITCH CREAM 1 G/100G
1 OINTMENT TOPICAL EVERY 6 HOURS
Refills: 0 | Status: DISCONTINUED | OUTPATIENT
Start: 2024-09-28 | End: 2024-09-30

## 2024-09-28 RX ADMIN — PIPERACILLIN SODIUM AND TAZOBACTAM SODIUM 200 GRAM(S): 12; 1.5 INJECTION, POWDER, LYOPHILIZED, FOR SOLUTION INTRAVENOUS at 09:31

## 2024-09-28 RX ADMIN — Medication 650 MILLIGRAM(S): at 09:32

## 2024-09-28 RX ADMIN — KETOROLAC TROMETHAMINE 30 MILLIGRAM(S): 10 TABLET, FILM COATED ORAL at 13:00

## 2024-09-28 RX ADMIN — Medication 975 MILLIGRAM(S): at 20:08

## 2024-09-28 RX ADMIN — Medication 600 MILLIGRAM(S): at 23:58

## 2024-09-28 RX ADMIN — Medication 600 MILLIGRAM(S): at 18:36

## 2024-09-28 RX ADMIN — Medication 400 MILLIGRAM(S): at 13:37

## 2024-09-28 RX ADMIN — Medication 975 MILLIGRAM(S): at 20:38

## 2024-09-28 RX ADMIN — PIPERACILLIN SODIUM AND TAZOBACTAM SODIUM 200 GRAM(S): 12; 1.5 INJECTION, POWDER, LYOPHILIZED, FOR SOLUTION INTRAVENOUS at 17:48

## 2024-09-28 RX ADMIN — KETOROLAC TROMETHAMINE 30 MILLIGRAM(S): 10 TABLET, FILM COATED ORAL at 11:47

## 2024-09-28 RX ADMIN — Medication 600 MILLIGRAM(S): at 17:47

## 2024-09-28 RX ADMIN — Medication 150 MILLILITER(S): at 04:24

## 2024-09-28 RX ADMIN — Medication 600 MILLILITER(S): at 04:01

## 2024-09-28 NOTE — OB PROVIDER DELIVERY SUMMARY - NSPROVIDERDELIVERYNOTE_OBGYN_ALL_OB_FT
Spontaneous vaginal delivery of liveborn infant from OA position. Head, shoulders, and body delivered easily. Infant passed to mother. Cord was clamped for 60 seconds and cut. Placenta delivered spontaneously, noted to be intact. Fundal massage was given and uterine fundus was found to be firm. Vaginal exam revealed intact cervix, sulci, vaginal walls and perineum. Excellent hemostasis was noted. Patient stable. Count correct x 2.    Jennifer Espinal, PGY-1 Spontaneous vaginal delivery of liveborn infant from OP position. Head, shoulders, and body delivered easily. Infant passed to mother. Cord was clamped for 60 seconds and cut. Placenta delivered spontaneously, noted to be intact. Fundal massage was given and uterine fundus was found to be firm. Vaginal exam revealed 2nd degree laceration, repaired in the usual fashion. Excellent hemostasis was noted. Patient stable. Count correct x 2.    Jennifer Espinal, PGY-1

## 2024-09-28 NOTE — OB PROVIDER DELIVERY SUMMARY - NSSELHIDDEN_OBGYN_ALL_OB_FT
[NS_DeliveryAttending1_OBGYN_ALL_OB_FT:[email or web address]=],[NS_DeliveryAssist1_OBGYN_ALL_OB_FT:[email or web address]=]

## 2024-09-28 NOTE — OB RN DELIVERY SUMMARY - NS_SEPSISRSKCALC_OBGYN_ALL_OB_FT
EOS calculated successfully. EOS Risk Factor: 0.5/1000 live births ([facility] national incidence); GA=39w;Temp=101.84; ROM=9.983; GBS='Negative'; Antibiotics='No antibiotics or any antibiotics < 2 hrs prior to birth'

## 2024-09-28 NOTE — OB PROVIDER LABOR PROGRESS NOTE - NS_OBIHIFHRDETAILS_OBGYN_ALL_OB_FT
130, moderate, +accels, no decels
Cat 2 with moderate variability and variable decels improving after AROM.

## 2024-09-28 NOTE — OB PROVIDER LABOR PROGRESS NOTE - NS_SUBJECTIVE/OBJECTIVE_OBGYN_ALL_OB_FT
ID [id number] (Lenard).     Pt evaluated at bedside for recurrent variable decelerations. Pt has an epidural in place and notes pressure during contractions.     ICU Vital Signs Last 24 Hrs  T(C): 36.7 (27 Sep 2024 21:00), Max: 37.1 (27 Sep 2024 11:15)  T(F): 98.06 (27 Sep 2024 21:00), Max: 98.8 (27 Sep 2024 11:15)  HR: 80 (28 Sep 2024 04:11) (59 - 98)  BP: 119/77 (28 Sep 2024 03:46) (97/52 - 119/77)  RR: 16 (27 Sep 2024 18:57) (16 - 16)  SpO2: 99% (28 Sep 2024 04:11) (81% - 100%)    O2 Parameters below as of 27 Sep 2024 11:15  Patient On (Oxygen Delivery Method): room air
Patient seen for CE, AROM uncomplicated, slightly bloody.
VE to evaluate progress in labor

## 2024-09-28 NOTE — PROGRESS NOTE ADULT - SUBJECTIVE AND OBJECTIVE BOX
Patient seen at bedside. I introduced myself to the patient and that I will be assuming care today. Patient is being induced for anhydramnios and has a history of pneumonia (s/p treatment) and [place] in pregnancy (currently asymptomatic and being followed by ID). She is s/p CRB, s/p AROM, on pitocin. Last exam was 7/90/-2 at 0730. FHR is currently category I with moderate variability. [place] ctx every 2-4min. Patient had isolated maternal temp. FHR baseline rising although currently wnl. Will treat with zosyn for presumed chorio. Tylenol prn fever. Plan for repeat exam 2hrs from last exam.          Patient seen at bedside. I introduced myself to the patient and that I will be assuming care today. Patient is being induced for anhydramnios and has a history of pneumonia (s/p treatment), asthma, and [place]agas in pregnancy (currently asymptomatic and being followed by ID). She is s/p CRB, s/p AROM, on pitocin. Last exam was 7/90/-2 at 0730. FHR is currently category I with moderate variability. [place] ctx every 2-4min. Patient had isolated maternal temp. FHR baseline rising although currently wnl. Will treat with zosyn for presumed chorio. Tylenol prn fever. Plan for repeat exam 2hrs from last exam.

## 2024-09-28 NOTE — OB PROVIDER LABOR PROGRESS NOTE - ASSESSMENT
34F  39wks admitted for an IOL for anhydramnios.     -No cervical changes noted  -Resuscitative measures include: lateral repositioning, pitocin paused  -IUPC and amnioinfusion placement discussed with pt, all questions answered, pt verbalized agreement and understanding to plan  -IUPC placed without complications, amnioinfusion started  -Continue to monitor EFM/toco    Discussed with Dr. Sue, PGY-3  Margot Butler PA-C  
Pit at 16u  CE as indicated  FHT monitoring  AROM at 1245a    DTaveras PGY3
- Pt making cervical change  - anticipate    - pt called for epidural     Eloina Goodman, PGY4  d/w Dr. Lanier

## 2024-09-28 NOTE — OB RN DELIVERY SUMMARY - NSSELHIDDEN_OBGYN_ALL_OB_FT
[NS_DeliveryAttending1_OBGYN_ALL_OB_FT:[email or web address]=],[NS_DeliveryAssist1_OBGYN_ALL_OB_FT:[email or web address]],[NS_DeliveryAssist2_OBGYN_ALL_OB_FT:[email or web address]],[NS_DeliveryRN_OBGYN_ALL_OB_FT:[id number]],[NS_CirculateRN2_OBGYN_ALL_OB_FT:[id number]=]

## 2024-09-29 LAB
APPEARANCE UR: CLEAR — SIGNIFICANT CHANGE UP
BACTERIA # UR AUTO: NEGATIVE /HPF — SIGNIFICANT CHANGE UP
BASOPHILS # BLD AUTO: 0.04 K/UL — SIGNIFICANT CHANGE UP (ref 0–0.2)
BASOPHILS NFR BLD AUTO: 0.3 % — SIGNIFICANT CHANGE UP (ref 0–2)
BILIRUB UR-MCNC: NEGATIVE — SIGNIFICANT CHANGE UP
CAST: 0 /LPF — SIGNIFICANT CHANGE UP (ref 0–4)
COLOR SPEC: ABNORMAL
DIFF PNL FLD: ABNORMAL
EOSINOPHIL # BLD AUTO: 0.22 K/UL — SIGNIFICANT CHANGE UP (ref 0–0.5)
EOSINOPHIL NFR BLD AUTO: 1.7 % — SIGNIFICANT CHANGE UP (ref 0–6)
GLUCOSE UR QL: NEGATIVE MG/DL — SIGNIFICANT CHANGE UP
HCT VFR BLD CALC: 27.2 % — LOW (ref 34.5–45)
HGB BLD-MCNC: 8.5 G/DL — LOW (ref 11.5–15.5)
IMM GRANULOCYTES NFR BLD AUTO: 0.5 % — SIGNIFICANT CHANGE UP (ref 0–0.9)
KETONES UR-MCNC: NEGATIVE MG/DL — SIGNIFICANT CHANGE UP
LEUKOCYTE ESTERASE UR-ACNC: ABNORMAL
LYMPHOCYTES # BLD AUTO: 15.6 % — SIGNIFICANT CHANGE UP (ref 13–44)
LYMPHOCYTES # BLD AUTO: 2 K/UL — SIGNIFICANT CHANGE UP (ref 1–3.3)
MCHC RBC-ENTMCNC: 26.8 PG — LOW (ref 27–34)
MCHC RBC-ENTMCNC: 31.3 GM/DL — LOW (ref 32–36)
MCV RBC AUTO: 85.8 FL — SIGNIFICANT CHANGE UP (ref 80–100)
MONOCYTES # BLD AUTO: 0.79 K/UL — SIGNIFICANT CHANGE UP (ref 0–0.9)
MONOCYTES NFR BLD AUTO: 6.2 % — SIGNIFICANT CHANGE UP (ref 2–14)
NEUTROPHILS # BLD AUTO: 9.69 K/UL — HIGH (ref 1.8–7.4)
NEUTROPHILS NFR BLD AUTO: 75.7 % — SIGNIFICANT CHANGE UP (ref 43–77)
NITRITE UR-MCNC: NEGATIVE — SIGNIFICANT CHANGE UP
NRBC # BLD: 0 /100 WBCS — SIGNIFICANT CHANGE UP (ref 0–0)
PH UR: 6.5 — SIGNIFICANT CHANGE UP (ref 5–8)
PLATELET # BLD AUTO: 183 K/UL — SIGNIFICANT CHANGE UP (ref 150–400)
PROT UR-MCNC: SIGNIFICANT CHANGE UP MG/DL
RBC # BLD: 3.17 M/UL — LOW (ref 3.8–5.2)
RBC # FLD: 15.9 % — HIGH (ref 10.3–14.5)
RBC CASTS # UR COMP ASSIST: 434 /HPF — HIGH (ref 0–4)
SP GR SPEC: 1.01 — SIGNIFICANT CHANGE UP (ref 1–1.03)
SQUAMOUS # UR AUTO: 3 /HPF — SIGNIFICANT CHANGE UP (ref 0–5)
UROBILINOGEN FLD QL: 0.2 MG/DL — SIGNIFICANT CHANGE UP (ref 0.2–1)
WBC # BLD: 12.8 K/UL — HIGH (ref 3.8–10.5)
WBC # FLD AUTO: 12.8 K/UL — HIGH (ref 3.8–10.5)
WBC UR QL: 13 /HPF — HIGH (ref 0–5)

## 2024-09-29 PROCEDURE — 93970 EXTREMITY STUDY: CPT | Mod: 26

## 2024-09-29 RX ADMIN — PIPERACILLIN SODIUM AND TAZOBACTAM SODIUM 200 GRAM(S): 12; 1.5 INJECTION, POWDER, LYOPHILIZED, FOR SOLUTION INTRAVENOUS at 00:39

## 2024-09-29 RX ADMIN — Medication 975 MILLIGRAM(S): at 20:37

## 2024-09-29 RX ADMIN — Medication 20 MILLIGRAM(S): at 13:27

## 2024-09-29 RX ADMIN — Medication 600 MILLIGRAM(S): at 19:28

## 2024-09-29 RX ADMIN — Medication 975 MILLIGRAM(S): at 03:09

## 2024-09-29 RX ADMIN — Medication 600 MILLIGRAM(S): at 05:50

## 2024-09-29 RX ADMIN — Medication 975 MILLIGRAM(S): at 09:54

## 2024-09-29 RX ADMIN — Medication 975 MILLIGRAM(S): at 21:07

## 2024-09-29 RX ADMIN — Medication 600 MILLIGRAM(S): at 00:28

## 2024-09-29 RX ADMIN — Medication 1 TABLET(S): at 13:27

## 2024-09-29 RX ADMIN — Medication 975 MILLIGRAM(S): at 10:30

## 2024-09-29 RX ADMIN — Medication 600 MILLIGRAM(S): at 23:38

## 2024-09-29 RX ADMIN — PIPERACILLIN SODIUM AND TAZOBACTAM SODIUM 200 GRAM(S): 12; 1.5 INJECTION, POWDER, LYOPHILIZED, FOR SOLUTION INTRAVENOUS at 10:05

## 2024-09-29 RX ADMIN — Medication 600 MILLIGRAM(S): at 18:47

## 2024-09-29 NOTE — PROGRESS NOTE ADULT - ASSESSMENT
33y/o PPD#1 from . Patient is stable and doing well post-operatively with no acute concerns at this time.      #chorioamnionitis  -Tm 38.8 @ 11a  -s/p Zosyn x24hrs  -WBC: 7.6->12.8  -Now afebrile    #PP  - VSS  - QBL: 250cc; Hct: 32.0->27.2  - Continue regular diet and PO hydration  - Increase ambulation, OOB and SCD's while in bed  - Continue motrin, tylenol, oxycodone PRN for pain control. Encouraged pt to request PRN pain medication for uncontrolled pain.  - Continue to monitor vitals and symptoms    Jennifer Espinal, PGY-1 33y/o PPD#1 from . Patient is stable and doing well post-operatively with no acute concerns at this time.      #chorioamnionitis  -Tm 38.8 @ 11a  -s/p Zosyn x24hrs  -WBC: 7.6->12.8  -Now afebrile    #PP  - VSS  - QBL: 250cc; Hct: 32.0->27.2  - Continue regular diet and PO hydration  - Increase ambulation, OOB and SCD's while in bed  - Continue motrin, tylenol, oxycodone PRN for pain control. Encouraged pt to request PRN pain medication for uncontrolled pain.  - Continue to monitor vitals and symptoms    Jennifer Espinal, PGY-1    attending note  PPD # 1  pt seen and evaluated and agree with the above note.    used: Surya  ID [id number]  pt c/o b/l calf tenderness this morning and more so to palpation during the exam. ambulation well. denies any calf pain with ambulation. c/o mild swelling of the LE.   also c/o dysuria during urination.  denies any urinary frequency.   reports mild lochia  vs stable afebrile  abd: fundus firm, non tender  pelvic : mild lochia  Ext: trace non pitting edema b/l + mild calf tenderness b/l to palpation. Neg manan's sign.   a/p  ppd # 1  chorioamnionitis. s/p zosyn. currently afebrile  anemia - h/o 8.5/27.2  asymptomatic - feso4 suppl  dysuria- u/a and cs   calf tenderness - LE dopplers  will continue to monitor and post partum care   Dr. Burns 35y/o PPD#1 from . Patient is stable and doing well post-operatively with no acute concerns at this time.      #chorioamnionitis  -Tm 38.8 @ 11a  -s/p Zosyn x24hrs  -WBC: 7.6->12.8  -Now afebrile    #PP  - VSS  - QBL: 250cc; Hct: 32.0->27.2  - Continue regular diet and PO hydration  - Increase ambulation, OOB and SCD's while in bed  - Continue motrin, tylenol, oxycodone PRN for pain control. Encouraged pt to request PRN pain medication for uncontrolled pain.  - Continue to monitor vitals and symptoms    Jennifer Espinal, PGY-1

## 2024-09-29 NOTE — PROGRESS NOTE ADULT - SUBJECTIVE AND OBJECTIVE BOX
OB Progress Note:  PPD#1    S: 33yo PPD#1 s/p . Patient feels well and denies having any fevers overnight and this morning. Pain is well controlled. She is tolerating a regular diet and passing flatus. She is voiding spontaneously, and ambulating without difficulty. Endorses light vaginal bleeding, soaking less than 1 pad/hour. Denies headaches/dizziness, fevers/chills. Denies CP/SOB. Denies N/V.     O:  Vitals:  Vital Signs Last 24 Hrs  T(C): 36.7 (29 Sep 2024 05:14), Max: 38.8 (28 Sep 2024 11:05)  T(F): 98.1 (29 Sep 2024 05:14), Max: 101.84 (28 Sep 2024 11:05)  HR: 69 (29 Sep 2024 05:14) (65 - 184)  BP: 107/67 (29 Sep 2024 05:14) (104/68 - 136/72)  BP(mean): --  RR: 17 (29 Sep 2024 05:14) (16 - 18)  SpO2: 99% (29 Sep 2024 05:14) (83% - 99%)    Parameters below as of 29 Sep 2024 05:14  Patient On (Oxygen Delivery Method): room air        MEDICATIONS  (STANDING):  acetaminophen     Tablet .. 975 milliGRAM(s) Oral <User Schedule>  diphtheria/tetanus/pertussis (acellular) Vaccine (Adacel) 0.5 milliLiter(s) IntraMuscular once  famotidine    Tablet 20 milliGRAM(s) Oral daily  ibuprofen  Tablet. 600 milliGRAM(s) Oral every 6 hours  lactated ringers Bolus 1000 milliLiter(s) IV Bolus once  oxytocin Infusion 167 milliUNIT(s)/Min (167 mL/Hr) IV Continuous <Continuous>  oxytocin Infusion. 4 milliUNIT(s)/Min (4 mL/Hr) IV Continuous <Continuous>  piperacillin/tazobactam IVPB.. 4.5 Gram(s) IV Intermittent every 8 hours  prenatal multivitamin 1 Tablet(s) Oral daily  sodium chloride 0.9% lock flush 3 milliLiter(s) IV Push every 8 hours  sodium chloride 0.9%. 1000 milliLiter(s) (150 mL/Hr) IV Continuous <Continuous>      Labs:  Blood type: A Positive  Rubella IgG: RPR: Negative                          8.5[L]   12.80[H] >-----------< 183    (  @ 06:35 )             27.2[L]                        10.1[L]   7.64 >-----------< 216    (  @ 12:45 )             32.0[L]                  Physical Exam:  General: NAD  Heart: all extremities well perfused  Lungs: breathing comfortably  Abdomen: soft, non-tender, non-distended, fundus firm  Vaginal: lochia wnl  Extremities: No calf tenderness, erythema or edema.             OB Progress Note:  PPD#1    S: 33yo PPD#1 s/p . Patient feels well and denies having any fevers overnight and this morning. Pain is well controlled. She is tolerating a regular diet and passing flatus. She is voiding spontaneously, and ambulating without difficulty. Endorses light vaginal bleeding, soaking less than 1 pad/hour. Denies headaches/dizziness, fevers/chills. Denies CP/SOB. Denies N/V.     O:  Vitals:  Vital Signs Last 24 Hrs  T(C): 36.7 (29 Sep 2024 05:14), Max: 38.8 (28 Sep 2024 11:05)  T(F): 98.1 (29 Sep 2024 05:14), Max: 101.84 (28 Sep 2024 11:05)  HR: 69 (29 Sep 2024 05:14) (65 - 184)  BP: 107/67 (29 Sep 2024 05:14) (104/68 - 136/72)  BP(mean): --  RR: 17 (29 Sep 2024 05:14) (16 - 18)  SpO2: 99% (29 Sep 2024 05:14) (83% - 99%)    Parameters below as of 29 Sep 2024 05:14  Patient On (Oxygen Delivery Method): room air        MEDICATIONS  (STANDING):  acetaminophen     Tablet .. 975 milliGRAM(s) Oral <User Schedule>  diphtheria/tetanus/pertussis (acellular) Vaccine (Adacel) 0.5 milliLiter(s) IntraMuscular once  famotidine    Tablet 20 milliGRAM(s) Oral daily  ibuprofen  Tablet. 600 milliGRAM(s) Oral every 6 hours  lactated ringers Bolus 1000 milliLiter(s) IV Bolus once  oxytocin Infusion 167 milliUNIT(s)/Min (167 mL/Hr) IV Continuous <Continuous>  oxytocin Infusion. 4 milliUNIT(s)/Min (4 mL/Hr) IV Continuous <Continuous>  piperacillin/tazobactam IVPB.. 4.5 Gram(s) IV Intermittent every 8 hours  prenatal multivitamin 1 Tablet(s) Oral daily  sodium chloride 0.9% lock flush 3 milliLiter(s) IV Push every 8 hours  sodium chloride 0.9%. 1000 milliLiter(s) (150 mL/Hr) IV Continuous <Continuous>      Labs:  Blood type: A Positive  Rubella IgG: RPR: Negative                                   8.5[L]   12.80[H] >-----------< 183    (  @ 06:35 )                     27.2[L]                        10.1[L]   7.64 >-----------< 216    (  @ 12:45 )             32.0[L]                  Physical Exam:  General: NAD  Heart: all extremities well perfused  Lungs: breathing comfortably  Abdomen: soft, non-tender, non-distended, fundus firm  Vaginal: lochia wnl  Extremities: No calf tenderness, erythema or edema.

## 2024-09-30 ENCOUNTER — TRANSCRIPTION ENCOUNTER (OUTPATIENT)
Age: 34
End: 2024-09-30

## 2024-09-30 VITALS
HEART RATE: 81 BPM | RESPIRATION RATE: 18 BRPM | DIASTOLIC BLOOD PRESSURE: 79 MMHG | SYSTOLIC BLOOD PRESSURE: 121 MMHG | TEMPERATURE: 98 F | OXYGEN SATURATION: 99 %

## 2024-09-30 DIAGNOSIS — Z3A.37 37 WEEKS GESTATION OF PREGNANCY: ICD-10-CM

## 2024-09-30 DIAGNOSIS — O09.299 SUPERVISION OF PREGNANCY WITH OTHER POOR REPRODUCTIVE OR OBSTETRIC HISTORY, UNSPECIFIED TRIMESTER: ICD-10-CM

## 2024-09-30 DIAGNOSIS — D35.2 BENIGN NEOPLASM OF PITUITARY GLAND: ICD-10-CM

## 2024-09-30 DIAGNOSIS — O09.33 SUPERVISION OF PREGNANCY WITH INSUFFICIENT ANTENATAL CARE, THIRD TRIMESTER: ICD-10-CM

## 2024-09-30 LAB
CULTURE RESULTS: SIGNIFICANT CHANGE UP
SPECIMEN SOURCE: SIGNIFICANT CHANGE UP

## 2024-09-30 PROCEDURE — 81001 URINALYSIS AUTO W/SCOPE: CPT

## 2024-09-30 PROCEDURE — 93970 EXTREMITY STUDY: CPT

## 2024-09-30 PROCEDURE — 85025 COMPLETE CBC W/AUTO DIFF WBC: CPT

## 2024-09-30 PROCEDURE — 87086 URINE CULTURE/COLONY COUNT: CPT

## 2024-09-30 PROCEDURE — 86850 RBC ANTIBODY SCREEN: CPT

## 2024-09-30 PROCEDURE — 88307 TISSUE EXAM BY PATHOLOGIST: CPT

## 2024-09-30 PROCEDURE — 86780 TREPONEMA PALLIDUM: CPT

## 2024-09-30 PROCEDURE — 59050 FETAL MONITOR W/REPORT: CPT

## 2024-09-30 PROCEDURE — 86901 BLOOD TYPING SEROLOGIC RH(D): CPT

## 2024-09-30 PROCEDURE — 86900 BLOOD TYPING SEROLOGIC ABO: CPT

## 2024-09-30 RX ORDER — ACETAMINOPHEN 325 MG
2 TABLET ORAL
Qty: 56 | Refills: 0
Start: 2024-09-30

## 2024-09-30 RX ADMIN — Medication 1 TABLET(S): at 12:40

## 2024-09-30 RX ADMIN — Medication 975 MILLIGRAM(S): at 14:41

## 2024-09-30 RX ADMIN — Medication 600 MILLIGRAM(S): at 05:35

## 2024-09-30 RX ADMIN — Medication 600 MILLIGRAM(S): at 00:11

## 2024-09-30 RX ADMIN — Medication 600 MILLIGRAM(S): at 13:40

## 2024-09-30 RX ADMIN — Medication 600 MILLIGRAM(S): at 12:40

## 2024-09-30 RX ADMIN — Medication 600 MILLIGRAM(S): at 06:07

## 2024-09-30 RX ADMIN — Medication 975 MILLIGRAM(S): at 15:30

## 2024-09-30 NOTE — PROGRESS NOTE ADULT - ASSESSMENT
35y/o PPD#2 from . Patient is stable and doing well post-operatively with no acute concerns at this time.      #chorioamnionitis  -Tm 38.8 @ 11a  -s/p Zosyn x24hrs  -WBC: 7.6->12.8  -Now afebrile    #PP  - VSS  - QBL: 250cc; Hct: 32.0->27.2  - Continue regular diet and PO hydration  - Increase ambulation, OOB and SCD's while in bed  - Continue motrin, tylenol, oxycodone PRN for pain control. Encouraged pt to request PRN pain medication for uncontrolled pain.  - Continue to monitor vitals and symptoms    Jennifer Espinal, PGY-1

## 2024-09-30 NOTE — DISCHARGE NOTE OB - PATIENT PORTAL LINK FT
You can access the FollowMyHealth Patient Portal offered by [facility] by registering at the following website: [email or web address]. By joining [facility]’s FollowMyHealth portal, you will also be able to view your health information using other applications (apps) compatible with our system.

## 2024-09-30 NOTE — DISCHARGE NOTE OB - CARE PLAN
1 Principal Discharge DX:	Vaginal delivery  Assessment and plan of treatment:	s/p Vaginal Delivery

## 2024-09-30 NOTE — DISCHARGE NOTE OB - PROVIDER TOKENS
FREE:[LAST:[[facility]],PHONE:[phone]],FAX:[(   )    -],ADDRESS:[Katey mendez jessy para  [address] semanas  [phone]  [address] [place] [address]]]

## 2024-09-30 NOTE — DISCHARGE NOTE OB - MEDICATION SUMMARY - MEDICATIONS TO STOP TAKING
I will STOP taking the medications listed below when I get home from the hospital:    aspirin 81 mg oral tablet, chewable  -- 2 tab(s) by mouth once a day    pyridoxine 25 mg oral tablet  -- 2 tab(s) by mouth every 24 hours    ondansetron 4 mg oral tablet, disintegrating  -- 1 tab(s) by mouth every 12 hours as needed for  nausea    Senna 8.6 mg oral tablet  -- 2 tab(s) by mouth once a day (at bedtime) MDD: 2    MiraLax oral powder for reconstitution  -- 17 gram(s) by mouth once a day (at bedtime)

## 2024-09-30 NOTE — PROGRESS NOTE ADULT - SUBJECTIVE AND OBJECTIVE BOX
OB Progress Note:  PPD#2    S: 35yo PPD#2 s/p . Patient feels well. Pain is well controlled. She is tolerating a regular diet, passing flatus and had a BM. She is voiding spontaneously, and ambulating without difficulty. Endorses light vaginal bleeding, soaking less than 1 pad/hour. Denies headaches/dizziness, fevers/chills. Denies CP/SOB. Denies N/V.     O:  Vitals:  Vital Signs Last 24 Hrs  T(C): 36.6 (30 Sep 2024 05:53), Max: 36.6 (29 Sep 2024 17:00)  T(F): 97.9 (30 Sep 2024 05:53), Max: 97.9 (29 Sep 2024 17:00)  HR: 64 (30 Sep 2024 05:53) (64 - 74)  BP: 115/79 (30 Sep 2024 05:53) (112/73 - 120/75)  BP(mean): --  RR: 18 (30 Sep 2024 05:53) (18 - 18)  SpO2: 99% (30 Sep 2024 05:53) (96% - 99%)    Parameters below as of 30 Sep 2024 05:53  Patient On (Oxygen Delivery Method): room air        MEDICATIONS  (STANDING):  acetaminophen     Tablet .. 975 milliGRAM(s) Oral <User Schedule>  diphtheria/tetanus/pertussis (acellular) Vaccine (Adacel) 0.5 milliLiter(s) IntraMuscular once  famotidine    Tablet 20 milliGRAM(s) Oral daily  ibuprofen  Tablet. 600 milliGRAM(s) Oral every 6 hours  lactated ringers Bolus 1000 milliLiter(s) IV Bolus once  oxytocin Infusion 167 milliUNIT(s)/Min (167 mL/Hr) IV Continuous <Continuous>  oxytocin Infusion. 4 milliUNIT(s)/Min (4 mL/Hr) IV Continuous <Continuous>  prenatal multivitamin 1 Tablet(s) Oral daily  sodium chloride 0.9% lock flush 3 milliLiter(s) IV Push every 8 hours  sodium chloride 0.9%. 1000 milliLiter(s) (150 mL/Hr) IV Continuous <Continuous>      Labs:  Blood type: A Positive  Rubella IgG: RPR: Negative                          8.5[L]   12.80[H] >-----------< 183    (  @ 06:35 )             27.2[L]                        10.1[L]   7.64 >-----------< 216    (  @ 12:45 )             32.0[L]                  Physical Exam:  General: NAD  Heart: all extremities well perfused  Lungs: breathing comfortably  Abdomen: soft, non-tender, non-distended, fundus firm  Vaginal: lochia wnl  Extremities: No calf tenderness, erythema or edema.      3

## 2024-09-30 NOTE — DISCHARGE NOTE OB - HOSPITAL COURSE
Patient had an uncomplicated  followed by an uncomplicated postpartum course.    EBL: 250  Hct: 27.2    On Postpartum day 2, patient was discharged home in stable condition, voiding spontaneously and with normal vital signs.

## 2024-09-30 NOTE — DISCHARGE NOTE OB - CARE PROVIDER_API CALL
[facility],   Haz andrea jessy para  6 semanas  [phone]  [address]  [address]  [place], [place]  Phone: [phone]  Fax: (   )    -  Follow Up Time:

## 2024-09-30 NOTE — PROGRESS NOTE ADULT - ATTENDING COMMENTS
Obstetrical  8am-6pm:  Patient seen and examined by me.  Agree with above resident note. LE dopplers negative.   Kwasi QUINTANA
attending note  PPD # 1  pt seen and evaluated and agree with the above note.    used: Surya  ID [id number]  pt c/o b/l calf tenderness this morning and more so to palpation during the exam. ambulation well. denies any calf pain with ambulation. c/o mild swelling of the LE.   also c/o dysuria during urination.  denies any urinary frequency.   reports mild lochia  vs stable afebrile  abd: fundus firm, non tender  pelvic : mild lochia  Ext: trace non pitting edema b/l + mild calf tenderness b/l to palpation. Neg manan's sign.   a/p  ppd # 1  chorioamnionitis. s/p zosyn. currently afebrile  anemia - h/o 8.5/27.2  asymptomatic - feso4 suppl  dysuria- u/a and cs   calf tenderness - LE dopplers  will continue to monitor and post partum care   Dr. Burns

## 2024-09-30 NOTE — DISCHARGE NOTE OB - NS MD DC FALL RISK RISK
For information on Fall & Injury Prevention, visit: [email or web address] OR  [email or web address] OR  https://www.cdc.gov/steadi/patient.html

## 2024-10-04 ENCOUNTER — APPOINTMENT (OUTPATIENT)
Dept: ANTEPARTUM | Facility: CLINIC | Age: 34
End: 2024-10-04

## 2024-10-06 LAB — SURGICAL PATHOLOGY STUDY: SIGNIFICANT CHANGE UP

## 2024-10-09 NOTE — ED ADULT TRIAGE NOTE - PATIENT/CAREGIVER ACCEPTED INTERPRETER SERVICES
RX refill request from the patient/pharmacy. Patient last seen 09- with labs, and next appt. scheduled for 03-  Requested Prescriptions     Pending Prescriptions Disp Refills    atenolol (TENORMIN) 50 MG tablet [Pharmacy Med Name: ATENOLOL 50MG TABLETS] 180 tablet 1     Sig: TAKE 1 TABLET BY MOUTH TWICE DAILY    .     yes

## 2024-10-15 ENCOUNTER — NON-APPOINTMENT (OUTPATIENT)
Age: 34
End: 2024-10-15

## 2024-10-17 ENCOUNTER — NON-APPOINTMENT (OUTPATIENT)
Age: 34
End: 2024-10-17

## 2024-10-25 DIAGNOSIS — B58.9 TOXOPLASMOSIS, UNSPECIFIED: ICD-10-CM

## 2024-10-31 ENCOUNTER — NON-APPOINTMENT (OUTPATIENT)
Age: 34
End: 2024-10-31

## 2024-11-06 ENCOUNTER — NON-APPOINTMENT (OUTPATIENT)
Age: 34
End: 2024-11-06

## 2024-11-15 ENCOUNTER — APPOINTMENT (OUTPATIENT)
Dept: MATERNAL FETAL MEDICINE | Facility: CLINIC | Age: 34
End: 2024-11-15

## 2024-11-22 ENCOUNTER — NON-APPOINTMENT (OUTPATIENT)
Age: 34
End: 2024-11-22

## 2024-11-22 ENCOUNTER — OUTPATIENT (OUTPATIENT)
Dept: OUTPATIENT SERVICES | Facility: HOSPITAL | Age: 34
LOS: 1 days | End: 2024-11-22
Payer: MEDICAID

## 2024-11-22 ENCOUNTER — LABORATORY RESULT (OUTPATIENT)
Age: 34
End: 2024-11-22

## 2024-11-22 ENCOUNTER — APPOINTMENT (OUTPATIENT)
Dept: MATERNAL FETAL MEDICINE | Facility: CLINIC | Age: 34
End: 2024-11-22

## 2024-11-22 VITALS
DIASTOLIC BLOOD PRESSURE: 64 MMHG | SYSTOLIC BLOOD PRESSURE: 98 MMHG | HEIGHT: 63 IN | BODY MASS INDEX: 28.17 KG/M2 | OXYGEN SATURATION: 98 % | WEIGHT: 159 LBS | HEART RATE: 85 BPM

## 2024-11-22 DIAGNOSIS — Z30.9 ENCOUNTER FOR CONTRACEPTIVE MANAGEMENT, UNSPECIFIED: ICD-10-CM

## 2024-11-22 DIAGNOSIS — B57.2 CHAGAS' DISEASE (CHRONIC) WITH HEART INVOLVEMENT: ICD-10-CM

## 2024-11-22 DIAGNOSIS — R10.11 RIGHT UPPER QUADRANT PAIN: ICD-10-CM

## 2024-11-22 LAB
ALBUMIN SERPL ELPH-MCNC: 4.5 G/DL — SIGNIFICANT CHANGE UP (ref 3.3–5)
ALP SERPL-CCNC: 99 U/L — SIGNIFICANT CHANGE UP (ref 40–120)
ALT FLD-CCNC: 86 U/L — HIGH (ref 10–45)
ANION GAP SERPL CALC-SCNC: 13 MMOL/L — SIGNIFICANT CHANGE UP (ref 5–17)
AST SERPL-CCNC: 47 U/L — HIGH (ref 10–40)
BASOPHILS # BLD AUTO: 0.04 K/UL — SIGNIFICANT CHANGE UP (ref 0–0.2)
BASOPHILS NFR BLD AUTO: 0.5 % — SIGNIFICANT CHANGE UP (ref 0–2)
BILIRUB SERPL-MCNC: 0.2 MG/DL — SIGNIFICANT CHANGE UP (ref 0.2–1.2)
BUN SERPL-MCNC: 11 MG/DL — SIGNIFICANT CHANGE UP (ref 7–23)
CALCIUM SERPL-MCNC: 9.6 MG/DL — SIGNIFICANT CHANGE UP (ref 8.4–10.5)
CHLORIDE SERPL-SCNC: 107 MMOL/L — SIGNIFICANT CHANGE UP (ref 96–108)
CO2 SERPL-SCNC: 21 MMOL/L — LOW (ref 22–31)
CREAT SERPL-MCNC: 0.65 MG/DL — SIGNIFICANT CHANGE UP (ref 0.5–1.3)
EGFR: 119 ML/MIN/1.73M2 — SIGNIFICANT CHANGE UP
EOSINOPHIL # BLD AUTO: 0.27 K/UL — SIGNIFICANT CHANGE UP (ref 0–0.5)
EOSINOPHIL NFR BLD AUTO: 3.3 % — SIGNIFICANT CHANGE UP (ref 0–6)
GLUCOSE SERPL-MCNC: 93 MG/DL — SIGNIFICANT CHANGE UP (ref 70–99)
HCT VFR BLD CALC: 41.4 % — SIGNIFICANT CHANGE UP (ref 34.5–45)
HGB BLD-MCNC: 13.1 G/DL — SIGNIFICANT CHANGE UP (ref 11.5–15.5)
IMM GRANULOCYTES NFR BLD AUTO: 0.6 % — SIGNIFICANT CHANGE UP (ref 0–0.9)
LYMPHOCYTES # BLD AUTO: 2.98 K/UL — SIGNIFICANT CHANGE UP (ref 1–3.3)
LYMPHOCYTES # BLD AUTO: 35.9 % — SIGNIFICANT CHANGE UP (ref 13–44)
MCHC RBC-ENTMCNC: 26.9 PG — LOW (ref 27–34)
MCHC RBC-ENTMCNC: 31.6 G/DL — LOW (ref 32–36)
MCV RBC AUTO: 85 FL — SIGNIFICANT CHANGE UP (ref 80–100)
MONOCYTES # BLD AUTO: 0.65 K/UL — SIGNIFICANT CHANGE UP (ref 0–0.9)
MONOCYTES NFR BLD AUTO: 7.8 % — SIGNIFICANT CHANGE UP (ref 2–14)
NEUTROPHILS # BLD AUTO: 4.31 K/UL — SIGNIFICANT CHANGE UP (ref 1.8–7.4)
NEUTROPHILS NFR BLD AUTO: 51.9 % — SIGNIFICANT CHANGE UP (ref 43–77)
PLATELET # BLD AUTO: 333 K/UL — SIGNIFICANT CHANGE UP (ref 150–400)
POTASSIUM SERPL-MCNC: 4.6 MMOL/L — SIGNIFICANT CHANGE UP (ref 3.5–5.3)
POTASSIUM SERPL-SCNC: 4.6 MMOL/L — SIGNIFICANT CHANGE UP (ref 3.5–5.3)
PROT SERPL-MCNC: 7.5 G/DL — SIGNIFICANT CHANGE UP (ref 6–8.3)
RBC # BLD: 4.87 M/UL — SIGNIFICANT CHANGE UP (ref 3.8–5.2)
RBC # FLD: 17 % — HIGH (ref 10.3–14.5)
SODIUM SERPL-SCNC: 140 MMOL/L — SIGNIFICANT CHANGE UP (ref 135–145)
WBC # BLD: 8.3 K/UL — SIGNIFICANT CHANGE UP (ref 3.8–10.5)
WBC # FLD AUTO: 8.3 K/UL — SIGNIFICANT CHANGE UP (ref 3.8–10.5)

## 2024-11-22 PROCEDURE — 99213 OFFICE O/P EST LOW 20 MIN: CPT | Mod: GC

## 2024-11-22 PROCEDURE — 80053 COMPREHEN METABOLIC PANEL: CPT

## 2024-11-22 PROCEDURE — G0463: CPT

## 2024-11-22 PROCEDURE — 85025 COMPLETE CBC W/AUTO DIFF WBC: CPT

## 2024-11-22 PROCEDURE — 36415 COLL VENOUS BLD VENIPUNCTURE: CPT

## 2024-11-25 ENCOUNTER — NON-APPOINTMENT (OUTPATIENT)
Age: 34
End: 2024-11-25

## 2024-11-27 ENCOUNTER — NON-APPOINTMENT (OUTPATIENT)
Age: 34
End: 2024-11-27

## 2024-12-04 ENCOUNTER — APPOINTMENT (OUTPATIENT)
Dept: OBGYN | Facility: CLINIC | Age: 34
End: 2024-12-04

## 2024-12-04 ENCOUNTER — NON-APPOINTMENT (OUTPATIENT)
Age: 34
End: 2024-12-04

## 2024-12-11 ENCOUNTER — APPOINTMENT (OUTPATIENT)
Dept: OBGYN | Facility: CLINIC | Age: 34
End: 2024-12-11

## 2024-12-13 ENCOUNTER — NON-APPOINTMENT (OUTPATIENT)
Age: 34
End: 2024-12-13

## 2024-12-17 ENCOUNTER — APPOINTMENT (OUTPATIENT)
Dept: NEUROLOGY | Facility: HOSPITAL | Age: 34
End: 2024-12-17

## 2024-12-23 ENCOUNTER — APPOINTMENT (OUTPATIENT)
Dept: ULTRASOUND IMAGING | Facility: CLINIC | Age: 34
End: 2024-12-23

## 2025-01-06 ENCOUNTER — APPOINTMENT (OUTPATIENT)
Dept: OBGYN | Facility: CLINIC | Age: 35
End: 2025-01-06
Payer: MEDICAID

## 2025-01-06 ENCOUNTER — OUTPATIENT (OUTPATIENT)
Dept: OUTPATIENT SERVICES | Facility: HOSPITAL | Age: 35
LOS: 1 days | End: 2025-01-06
Payer: MEDICAID

## 2025-01-06 DIAGNOSIS — Z30.017 ENCOUNTER FOR INITIAL PRESCRIPTION OF IMPLANTABLE SUBDERMAL CONTRACEPTIVE: ICD-10-CM

## 2025-01-06 DIAGNOSIS — N76.0 ACUTE VAGINITIS: ICD-10-CM

## 2025-01-06 PROCEDURE — 81025 URINE PREGNANCY TEST: CPT

## 2025-01-06 PROCEDURE — 11981 INSERTION DRUG DLVR IMPLANT: CPT

## 2025-01-06 PROCEDURE — 90651 9VHPV VACCINE 2/3 DOSE IM: CPT

## 2025-01-06 RX ADMIN — ETONOGESTREL 0 MG: 68 IMPLANT SUBCUTANEOUS at 00:00

## 2025-01-13 ENCOUNTER — OUTPATIENT (OUTPATIENT)
Dept: OUTPATIENT SERVICES | Facility: HOSPITAL | Age: 35
LOS: 1 days | End: 2025-01-13
Payer: MEDICAID

## 2025-01-13 ENCOUNTER — APPOINTMENT (OUTPATIENT)
Dept: INFECTIOUS DISEASE | Facility: CLINIC | Age: 35
End: 2025-01-13
Payer: MEDICAID

## 2025-01-13 VITALS
DIASTOLIC BLOOD PRESSURE: 78 MMHG | WEIGHT: 172 LBS | HEART RATE: 78 BPM | HEIGHT: 63 IN | TEMPERATURE: 98.7 F | OXYGEN SATURATION: 98 % | SYSTOLIC BLOOD PRESSURE: 121 MMHG | BODY MASS INDEX: 30.48 KG/M2

## 2025-01-13 DIAGNOSIS — B57.2 CHAGAS' DISEASE (CHRONIC) WITH HEART INVOLVEMENT: ICD-10-CM

## 2025-01-13 DIAGNOSIS — B97.89 OTHER VIRAL AGENTS AS THE CAUSE OF DISEASES CLASSIFIED ELSEWHERE: ICD-10-CM

## 2025-01-13 LAB
ALBUMIN SERPL ELPH-MCNC: 4.5 G/DL
ALP BLD-CCNC: 87 U/L
ALT SERPL-CCNC: 45 U/L
ANION GAP SERPL CALC-SCNC: 11 MMOL/L
AST SERPL-CCNC: 26 U/L
BASOPHILS # BLD AUTO: 0.02 K/UL
BASOPHILS NFR BLD AUTO: 0.2 %
BILIRUB SERPL-MCNC: 0.2 MG/DL
BUN SERPL-MCNC: 12 MG/DL
CALCIUM SERPL-MCNC: 9.5 MG/DL
CHLORIDE SERPL-SCNC: 109 MMOL/L
CO2 SERPL-SCNC: 21 MMOL/L
CREAT SERPL-MCNC: 0.73 MG/DL
EGFR: 111 ML/MIN/1.73M2
EOSINOPHIL # BLD AUTO: 0.28 K/UL
EOSINOPHIL NFR BLD AUTO: 3.4 %
GLUCOSE SERPL-MCNC: 100 MG/DL
HCT VFR BLD CALC: 41.1 %
HGB BLD-MCNC: 13.1 G/DL
IMM GRANULOCYTES NFR BLD AUTO: 0.4 %
LYMPHOCYTES # BLD AUTO: 2.8 K/UL
LYMPHOCYTES NFR BLD AUTO: 33.8 %
MAN DIFF?: NORMAL
MCHC RBC-ENTMCNC: 28.2 PG
MCHC RBC-ENTMCNC: 31.9 G/DL
MCV RBC AUTO: 88.6 FL
MONOCYTES # BLD AUTO: 0.67 K/UL
MONOCYTES NFR BLD AUTO: 8.1 %
NEUTROPHILS # BLD AUTO: 4.48 K/UL
NEUTROPHILS NFR BLD AUTO: 54.1 %
PLATELET # BLD AUTO: 312 K/UL
POTASSIUM SERPL-SCNC: 4.4 MMOL/L
PROT SERPL-MCNC: 7.1 G/DL
RBC # BLD: 4.64 M/UL
RBC # FLD: 15.9 %
SODIUM SERPL-SCNC: 141 MMOL/L
WBC # FLD AUTO: 8.28 K/UL

## 2025-01-13 PROCEDURE — G0463: CPT

## 2025-01-13 PROCEDURE — 99215 OFFICE O/P EST HI 40 MIN: CPT

## 2025-01-17 DIAGNOSIS — Z30.017 ENCOUNTER FOR INITIAL PRESCRIPTION OF IMPLANTABLE SUBDERMAL CONTRACEPTIVE: ICD-10-CM

## 2025-02-13 ENCOUNTER — APPOINTMENT (OUTPATIENT)
Dept: NEUROLOGY | Facility: HOSPITAL | Age: 35
End: 2025-02-13

## 2025-02-19 ENCOUNTER — APPOINTMENT (OUTPATIENT)
Dept: CARDIOLOGY | Facility: CLINIC | Age: 35
End: 2025-02-19